# Patient Record
Sex: FEMALE | Race: WHITE | NOT HISPANIC OR LATINO | Employment: STUDENT | URBAN - METROPOLITAN AREA
[De-identification: names, ages, dates, MRNs, and addresses within clinical notes are randomized per-mention and may not be internally consistent; named-entity substitution may affect disease eponyms.]

---

## 2017-01-05 ENCOUNTER — HOSPITAL ENCOUNTER (OUTPATIENT)
Dept: GENERAL RADIOLOGY | Facility: HOSPITAL | Age: 37
Discharge: HOME | End: 2017-01-05
Admitting: NURSE PRACTITIONER

## 2017-01-05 ENCOUNTER — TRANSCRIBE ORDERS (OUTPATIENT)
Dept: ADMINISTRATIVE | Facility: HOSPITAL | Age: 37
End: 2017-01-05

## 2017-01-05 DIAGNOSIS — M79.645 THUMB PAIN, LEFT: Primary | ICD-10-CM

## 2017-01-05 PROCEDURE — 73140 X-RAY EXAM OF FINGER(S): CPT

## 2017-06-30 ENCOUNTER — DOCUMENTATION (OUTPATIENT)
Dept: BARIATRICS/WEIGHT MGMT | Facility: CLINIC | Age: 37
End: 2017-06-30

## 2017-06-30 DIAGNOSIS — R06.00 DYSPNEA, UNSPECIFIED TYPE: ICD-10-CM

## 2017-06-30 DIAGNOSIS — R53.83 FATIGUE, UNSPECIFIED TYPE: ICD-10-CM

## 2017-06-30 DIAGNOSIS — R10.13 DYSPEPSIA: Primary | ICD-10-CM

## 2017-06-30 RX ORDER — PHENTERMINE HYDROCHLORIDE 37.5 MG/1
37.5 CAPSULE ORAL 2 TIMES DAILY
COMMUNITY
End: 2018-03-06

## 2017-07-31 DIAGNOSIS — R10.13 DYSPEPSIA: ICD-10-CM

## 2017-07-31 DIAGNOSIS — R06.00 DYSPNEA, UNSPECIFIED TYPE: ICD-10-CM

## 2017-07-31 DIAGNOSIS — R53.83 FATIGUE, UNSPECIFIED TYPE: ICD-10-CM

## 2017-08-03 ENCOUNTER — DOCUMENTATION (OUTPATIENT)
Dept: BARIATRICS/WEIGHT MGMT | Facility: CLINIC | Age: 37
End: 2017-08-03

## 2017-08-03 ENCOUNTER — OFFICE VISIT (OUTPATIENT)
Dept: BARIATRICS/WEIGHT MGMT | Facility: CLINIC | Age: 37
End: 2017-08-03

## 2017-08-03 VITALS
BODY MASS INDEX: 41.46 KG/M2 | SYSTOLIC BLOOD PRESSURE: 156 MMHG | OXYGEN SATURATION: 98 % | DIASTOLIC BLOOD PRESSURE: 106 MMHG | HEIGHT: 66 IN | HEART RATE: 70 BPM | TEMPERATURE: 97.3 F | WEIGHT: 258 LBS | RESPIRATION RATE: 24 BRPM

## 2017-08-03 DIAGNOSIS — R53.83 FATIGUE, UNSPECIFIED TYPE: Primary | ICD-10-CM

## 2017-08-03 DIAGNOSIS — R06.09 DYSPNEA ON EXERTION: ICD-10-CM

## 2017-08-03 DIAGNOSIS — R10.13 DYSPEPSIA: ICD-10-CM

## 2017-08-03 DIAGNOSIS — E66.01 MORBID OBESITY, UNSPECIFIED OBESITY TYPE (HCC): ICD-10-CM

## 2017-08-03 PROCEDURE — 99205 OFFICE O/P NEW HI 60 MIN: CPT | Performed by: PHYSICIAN ASSISTANT

## 2017-08-03 RX ORDER — DIPHENHYDRAMINE HCL 25 MG
25 CAPSULE ORAL EVERY 6 HOURS PRN
COMMUNITY
End: 2018-04-16

## 2017-08-03 RX ORDER — MELATONIN
1000 DAILY
COMMUNITY
End: 2018-02-27

## 2017-08-03 NOTE — PROGRESS NOTES
"Weight Loss Surgery  Presurgical Nutrition Assessment     Zofia Ortiz  2017  [id number]  [id number]  1980  female    Surgery desired: Sleeve Gastrectomy    Ht 66\" (167.6 cm); Wt 258 # (117 kg); BMI 41.5  Past Medical History:   Diagnosis Date   • Dyspepsia    • Dyspnea on exertion    • Elevated BP     156/106 @ Intake, denies hx HTN   • Fatigue    • Former smoker     Vapes now   • Morbid obesity    • Vitamin D deficiency      Past Surgical History:   Procedure Laterality Date   •  SECTION WITH TUBAL     • KNEE ARTHROSCOPY Right      No Known Allergies    Current Outpatient Prescriptions:   •  cholecalciferol (VITAMIN D3) 1000 UNITS tablet, Take 1,000 Units by mouth Daily., Disp: , Rfl:   •  diphenhydrAMINE (BENADRYL) 25 mg capsule, Take 25 mg by mouth Every 6 (Six) Hours As Needed for Sleep., Disp: , Rfl:   •  Multiple Vitamins-Minerals (DAILY MULTIVITAMIN PO), Take  by mouth., Disp: , Rfl:   •  phentermine 37.5 MG capsule, Take 37.5 mg by mouth 2 (Two) Times a Day., Disp: , Rfl:       Nutrition Assessment    Estimated energy needs:  1875 kcal    Estimated calories for weight loss:  1400 kcal    IBW (Pounds):  150 #        Excess body weight (Pounds):  108 #       Nutrition Recall  24 Hour recall: (B) (L) (D) -  Reviewed and discussed with patient.  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  Has Myoplex shakes she purchased at health food store some days.  Often 2 meals qd only.  Frequent fast food sandwiches.  Encouraged healthier alternatives to fast food sandwiches.       Exercise  intermittently; CrossFit & walking as able (d/t knee pain is limited)      Education    Provided info packet re. Sleeve Gastrectomy; .  Reviewed mechanics of healthy eating for current use & ongoing /p recovery from surgery.  Enc'd pt to choose preferred protein powder or pkg'd protein beverage for " post-op diet and for use now as needed.  Discussed goal setting to address change from unhealthy eating habits to healthier ones.    Recommend that team proceed with surgery and follow per protocol.      Nutrition Goals   Dietary Guidelines per info packet, as above.  Protein goal:  grams per day   -140 grams per day  Eliminate soda    Exercise Goals  Continue current exercise routine   Add 15-30 minutes of activity per day as tolerated      Eufemia Mcclain RD  08/03/2017  10:46 AM

## 2017-08-03 NOTE — PROGRESS NOTES
[facility] BARIATRIC SURGERY  [address]  [facility] [address]  [phone]      Patient  Name:  Zofia Ortiz.  :  1980      Date of Visit: 8/3/2017      Chief Complaint:  weight gain; unable to maintain weight loss    History of Present Illness:  Zofia Ortiz is a 37 y.o. female who presents today for evaluation, education and consultation regarding weight loss surgery. The patient is interested in sleeve gastrectomy     Zofia has been overweight for at least 11 years, has been 35 pounds or more overweight for at least 10 years, has been 100 pounds or more overweight for 10 or more years and started dieting at age 14.     Previous diet attempts include: Body for Life/Gerardo Giordano, High Protein, Low Carbohydrate, Low Fat, [facility], Calorie Counting, Kasie's Diet, Cabbage Soup, Fasting and Slim Fast; Horizon weight loss, figure weight loss and pound matters; Amphetamines.  The most weight Zofia lost was 75 pounds on exercise and diet but was only able to maintain that weight loss for 3-4 years.  Her maximum lifetime weight is 265 pounds.      Past Medical History:   Diagnosis Date   • Dyspepsia    • Dyspnea on exertion    • Elevated BP     156/106 @ Intake, denies hx HTN   • Fatigue    • Former smoker     Vapes now   • Morbid obesity    • Vitamin D deficiency      Past Surgical History:   Procedure Laterality Date   •  SECTION WITH TUBAL     • KNEE ARTHROSCOPY Right        No Known Allergies      Current Outpatient Prescriptions:   •  cholecalciferol (VITAMIN D3) 1000 UNITS tablet, Take 1,000 Units by mouth Daily., Disp: , Rfl:   •  diphenhydrAMINE (BENADRYL) 25 mg capsule, Take 25 mg by mouth Every 6 (Six) Hours As Needed for Sleep., Disp: , Rfl:   •  Multiple Vitamins-Minerals (DAILY MULTIVITAMIN PO), Take  by mouth., Disp: , Rfl:   •  phentermine 37.5 MG capsule, Take 37.5 mg by mouth 2 (Two) Times a Day., Disp: , Rfl:     Social History     Social  History   • Marital status:      Spouse name: Bruce Ortiz   • Number of children: 2   • Years of education: Bachelor's     Occupational History   • Homemaker      Social History Main Topics   • Smoking status: Former Smoker     Packs/day: 0.50     Years: 8.00     Quit date: 2013   • Smokeless tobacco: Never Used      Comment: uses vape w/ small amount of nicotine daily-smokeless   • Alcohol use Yes      Comment: 2-3 drinks weekly 2-3 drinks each time    • Drug use: No   • Sexual activity: Yes     Partners: Male      Comment:       Other Topics Concern   • Not on file     Social History Narrative    Lives in [place], [place] w/  and 2 children.  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     Family History   Problem Relation Age of Onset   • Hypertension Mother    • No Known Problems Father    • No Known Problems Sister    • Obesity Maternal Grandmother    • Hypertension Maternal Grandmother    • Diabetes Maternal Grandmother    • Melanoma Maternal Grandfather        Review of Systems:  Constitutional:  The patient reports fatigue, weight gain and denies fevers and chills.  Cardiovascular:  The patient denies HTN, HLD, heart disease and DVT.  Respiratory:  The patient denies asthma, apnea and PE.  Gastrointestinal:  The patient denies heartburn, gallbladder issues, or liver disease.  Genitourinary:  The patient denies renal insufficiency.    Musculoskeletal:  The patient denies joint pain, fibromyalgia and arthritis.  Neurological:  The patient denies seizure and stroke.  Psychiatric:  The patient denies anxiety, depression and bipolar disorder.  Endocrine:  The patient denies diabetes and thyroid disease.  Hematologic:  The patient denies anemia and bleeding disorder.  Skin:  The patient denies MRSA.    Physical Exam:  Vital Signs:  Weight: 258 lb (117 kg)   Body mass index is 41.64 kg/(m^2).  Temp: 97.3 °F (36.3 °C)   Heart Rate: 70   BP: (!) 156/106 (Recheck: 161/103)     Physical Exam   Constitutional: She is  oriented to person, place, and time. She appears well-developed and well-nourished.   HENT:   Head: Normocephalic and atraumatic.   Eyes: Conjunctivae are normal. No scleral icterus.   Neck: Neck supple. No thyromegaly present.   Cardiovascular: Normal rate and regular rhythm.    No murmur heard.  Pulmonary/Chest: Effort normal and breath sounds normal. No respiratory distress. She has no wheezes. She has no rales.   Abdominal: Soft. Bowel sounds are normal. She exhibits no distension and no mass. There is no tenderness. No hernia.   Scars:  pfannenstiel   Musculoskeletal: Normal range of motion. She exhibits no edema.   Neurological: She is alert and oriented to person, place, and time. Gait normal.   Skin: Skin is warm and dry. No rash noted.   Psychiatric: She has a normal mood and affect. Judgment normal.   Vitals reviewed.         Patient Active Problem List   Diagnosis   • Vitamin D deficiency   • Morbid obesity   • Fatigue   • Dyspepsia   • Dyspnea on exertion   • Former smoker   • Elevated BP       Assessment:    Zofia Ortiz is a 37 y.o. year old female with medically complicated obesity pursuing sleeve gastrectomy.    Weight loss surgery is deemed medically necessary given the following obesity related comorbidities including possible HTN with current Weight: 258 lb (117 kg) and Body mass index is 41.64 kg/(m^2)..    Plan:  The consultation plan and program requirements were reviewed with the patient.  The patient has been advised that a letter of medical support must be obtained from her primary care physician or referring provider. A psychological evaluation will be arranged.  A nutritional evaluation will be performed.  The patient was advised to start a high protein and low carbohydrate diet.  Necessary lifestyle modifications were discussed.  Instructions on how to access SAGAR was given to the patient.  SAGAR is an internet based educational video that explains the surgical procedure chosen and answers  basic questions regarding that procedure.     Preoperative testing will include: CBC, CMP, Fasting Lipids, TSH, H.Pylori, Pulmonary Function Testing, CXR and EKG     Additional preop clearances required prior to surgery: None.      Patient understands that bariatric surgery is not cosmetic surgery but rather a tool to help make a lifelong commitment to lifestyle changes including diet, exercise, behavior modifications, and healthy habits.      GUERITA Anne

## 2017-08-07 LAB
ALBUMIN SERPL-MCNC: 4.2 G/DL (ref 3.2–4.8)
ALBUMIN/GLOB SERPL: 1.6 G/DL (ref 1.5–2.5)
ALP SERPL-CCNC: 75 U/L (ref 25–100)
ALT SERPL-CCNC: 42 U/L (ref 7–40)
AST SERPL-CCNC: 35 U/L (ref 0–33)
BILIRUB SERPL-MCNC: 0.3 MG/DL (ref 0.3–1.2)
BUN SERPL-MCNC: 10 MG/DL (ref 9–23)
BUN/CREAT SERPL: 14.3 (ref 7–25)
CALCIUM SERPL-MCNC: 9.2 MG/DL (ref 8.7–10.4)
CHLORIDE SERPL-SCNC: 104 MMOL/L (ref 99–109)
CHOLEST SERPL-MCNC: 173 MG/DL (ref 0–200)
CO2 SERPL-SCNC: 25 MMOL/L (ref 20–31)
CREAT SERPL-MCNC: 0.7 MG/DL (ref 0.6–1.3)
ERYTHROCYTE [DISTWIDTH] IN BLOOD BY AUTOMATED COUNT: 12.5 % (ref 11.3–14.5)
GLOBULIN SER CALC-MCNC: 2.6 GM/DL
GLUCOSE SERPL-MCNC: 107 MG/DL (ref 70–100)
H PYLORI IGA SER-ACNC: <9 UNITS (ref 0–8.9)
H PYLORI IGG SER IA-ACNC: <0.9 U/ML (ref 0–0.8)
H PYLORI IGM SER-ACNC: <9 UNITS (ref 0–8.9)
HCT VFR BLD AUTO: 43.8 % (ref 34.5–44)
HDLC SERPL-MCNC: 85 MG/DL (ref 40–60)
HGB BLD-MCNC: 14.1 G/DL (ref 11.5–15.5)
LDLC SERPL CALC-MCNC: 70 MG/DL (ref 0–100)
MCH RBC QN AUTO: 30.6 PG (ref 27–31)
MCHC RBC AUTO-ENTMCNC: 32.2 G/DL (ref 32–36)
MCV RBC AUTO: 95 FL (ref 80–99)
PLATELET # BLD AUTO: 324 10*3/MM3 (ref 150–450)
POTASSIUM SERPL-SCNC: 4.2 MMOL/L (ref 3.5–5.5)
PROT SERPL-MCNC: 6.8 G/DL (ref 5.7–8.2)
RBC # BLD AUTO: 4.61 10*6/MM3 (ref 3.89–5.14)
SODIUM SERPL-SCNC: 137 MMOL/L (ref 132–146)
TRIGL SERPL-MCNC: 89 MG/DL (ref 0–150)
TSH SERPL DL<=0.005 MIU/L-ACNC: 4.21 MIU/ML (ref 0.35–5.35)
VLDLC SERPL CALC-MCNC: 17.8 MG/DL
WBC # BLD AUTO: 8.71 10*3/MM3 (ref 3.5–10.8)

## 2018-02-13 DIAGNOSIS — R53.83 FATIGUE, UNSPECIFIED TYPE: ICD-10-CM

## 2018-02-13 DIAGNOSIS — R06.00 DYSPNEA, UNSPECIFIED TYPE: Primary | ICD-10-CM

## 2018-02-22 ENCOUNTER — LAB (OUTPATIENT)
Dept: LAB | Facility: HOSPITAL | Age: 38
End: 2018-02-22

## 2018-02-22 ENCOUNTER — HOSPITAL ENCOUNTER (OUTPATIENT)
Dept: GENERAL RADIOLOGY | Facility: HOSPITAL | Age: 38
Discharge: HOME | End: 2018-02-22
Admitting: PHYSICIAN ASSISTANT

## 2018-02-22 DIAGNOSIS — R53.83 FATIGUE, UNSPECIFIED TYPE: ICD-10-CM

## 2018-02-22 DIAGNOSIS — R06.00 DYSPNEA, UNSPECIFIED TYPE: ICD-10-CM

## 2018-02-22 LAB
ALBUMIN SERPL-MCNC: 4.4 G/DL (ref 3.2–4.8)
ALBUMIN/GLOB SERPL: 1.6 G/DL (ref 1.5–2.5)
ALP SERPL-CCNC: 83 U/L (ref 25–100)
ALT SERPL W P-5'-P-CCNC: 82 U/L (ref 7–40)
ANION GAP SERPL CALCULATED.3IONS-SCNC: 9 MMOL/L (ref 3–11)
AST SERPL-CCNC: 57 U/L (ref 0–33)
BILIRUB SERPL-MCNC: 0.7 MG/DL (ref 0.3–1.2)
BUN BLD-MCNC: 7 MG/DL (ref 9–23)
BUN/CREAT SERPL: 8.8 (ref 7–25)
CALCIUM SPEC-SCNC: 9.4 MG/DL (ref 8.7–10.4)
CHLORIDE SERPL-SCNC: 104 MMOL/L (ref 99–109)
CO2 SERPL-SCNC: 27 MMOL/L (ref 20–31)
CREAT BLD-MCNC: 0.8 MG/DL (ref 0.6–1.3)
DEPRECATED RDW RBC AUTO: 44.3 FL (ref 37–54)
ERYTHROCYTE [DISTWIDTH] IN BLOOD BY AUTOMATED COUNT: 13 % (ref 11.3–14.5)
GFR SERPL CREATININE-BSD FRML MDRD: 80 ML/MIN/1.73
GLOBULIN UR ELPH-MCNC: 2.7 GM/DL
GLUCOSE BLD-MCNC: 116 MG/DL (ref 70–100)
HCT VFR BLD AUTO: 47.6 % (ref 34.5–44)
HGB BLD-MCNC: 15.8 G/DL (ref 11.5–15.5)
MCH RBC QN AUTO: 31.3 PG (ref 27–31)
MCHC RBC AUTO-ENTMCNC: 33.2 G/DL (ref 32–36)
MCV RBC AUTO: 94.4 FL (ref 80–99)
PLATELET # BLD AUTO: 320 10*3/MM3 (ref 150–450)
PMV BLD AUTO: 10.3 FL (ref 6–12)
POTASSIUM BLD-SCNC: 3.5 MMOL/L (ref 3.5–5.5)
PROT SERPL-MCNC: 7.1 G/DL (ref 5.7–8.2)
RBC # BLD AUTO: 5.04 10*6/MM3 (ref 3.89–5.14)
SODIUM BLD-SCNC: 140 MMOL/L (ref 132–146)
WBC NRBC COR # BLD: 8.59 10*3/MM3 (ref 3.5–10.8)

## 2018-02-22 PROCEDURE — 93005 ELECTROCARDIOGRAM TRACING: CPT | Performed by: PHYSICIAN ASSISTANT

## 2018-02-22 PROCEDURE — 85027 COMPLETE CBC AUTOMATED: CPT

## 2018-02-22 PROCEDURE — 80053 COMPREHEN METABOLIC PANEL: CPT

## 2018-02-22 PROCEDURE — 36415 COLL VENOUS BLD VENIPUNCTURE: CPT

## 2018-02-22 PROCEDURE — 71046 X-RAY EXAM CHEST 2 VIEWS: CPT

## 2018-02-22 PROCEDURE — 93010 ELECTROCARDIOGRAM REPORT: CPT | Performed by: INTERNAL MEDICINE

## 2018-02-27 ENCOUNTER — CONSULT (OUTPATIENT)
Dept: BARIATRICS/WEIGHT MGMT | Facility: CLINIC | Age: 38
End: 2018-02-27

## 2018-02-27 VITALS
WEIGHT: 239 LBS | DIASTOLIC BLOOD PRESSURE: 107 MMHG | HEIGHT: 66 IN | TEMPERATURE: 98.1 F | RESPIRATION RATE: 18 BRPM | SYSTOLIC BLOOD PRESSURE: 146 MMHG | BODY MASS INDEX: 38.41 KG/M2 | OXYGEN SATURATION: 99 % | HEART RATE: 88 BPM

## 2018-02-27 DIAGNOSIS — Z53.21 PATIENT LEFT WITHOUT BEING SEEN: Primary | ICD-10-CM

## 2018-03-06 ENCOUNTER — CONSULT (OUTPATIENT)
Dept: BARIATRICS/WEIGHT MGMT | Facility: CLINIC | Age: 38
End: 2018-03-06

## 2018-03-06 VITALS
WEIGHT: 253.5 LBS | TEMPERATURE: 97.9 F | OXYGEN SATURATION: 99 % | DIASTOLIC BLOOD PRESSURE: 88 MMHG | SYSTOLIC BLOOD PRESSURE: 159 MMHG | HEIGHT: 66 IN | RESPIRATION RATE: 18 BRPM | BODY MASS INDEX: 40.74 KG/M2 | HEART RATE: 88 BPM

## 2018-03-06 DIAGNOSIS — E66.01 OBESITY, CLASS III, BMI 40-49.9 (MORBID OBESITY) (HCC): Primary | ICD-10-CM

## 2018-03-06 PROCEDURE — 99407 BEHAV CHNG SMOKING > 10 MIN: CPT | Performed by: SURGERY

## 2018-03-06 PROCEDURE — 99214 OFFICE O/P EST MOD 30 MIN: CPT | Performed by: SURGERY

## 2018-03-06 RX ORDER — SCOLOPAMINE TRANSDERMAL SYSTEM 1 MG/1
1 PATCH, EXTENDED RELEASE TRANSDERMAL ONCE
Status: CANCELLED | OUTPATIENT
Start: 2018-03-06 | End: 2018-03-06

## 2018-03-06 RX ORDER — CHLORHEXIDINE GLUCONATE 0.12 MG/ML
15 RINSE ORAL ONCE
Status: CANCELLED | OUTPATIENT
Start: 2018-03-06

## 2018-03-06 RX ORDER — PANTOPRAZOLE SODIUM 40 MG/10ML
40 INJECTION, POWDER, LYOPHILIZED, FOR SOLUTION INTRAVENOUS ONCE
Status: CANCELLED | OUTPATIENT
Start: 2018-03-06 | End: 2018-03-06

## 2018-03-06 RX ORDER — SODIUM CHLORIDE 0.9 % (FLUSH) 0.9 %
1-10 SYRINGE (ML) INJECTION AS NEEDED
Status: CANCELLED | OUTPATIENT
Start: 2018-03-06

## 2018-03-06 RX ORDER — SODIUM CHLORIDE, SODIUM LACTATE, POTASSIUM CHLORIDE, CALCIUM CHLORIDE 600; 310; 30; 20 MG/100ML; MG/100ML; MG/100ML; MG/100ML
100 INJECTION, SOLUTION INTRAVENOUS CONTINUOUS
Status: CANCELLED | OUTPATIENT
Start: 2018-03-06

## 2018-03-06 NOTE — PROGRESS NOTES
[facility] BARIATRIC SURGERY  [address]  [facility] [address]  [phone]      Patient  Name:  Zofia Ortiz.  :  1980      Date of Visit: 3/6/2018      Chief Complaint:  weight gain; unable to maintain weight loss.  Preop LSG    History of Present Illness:  Zofia Ortiz is a 38 y.o. female who presents today for evaluation, education and consultation regarding weight loss surgery. The patient is interested in sleeve gastrectomy     Zofia has been overweight for at least 11 years, has been 35 pounds or more overweight for at least 10 years, has been 100 pounds or more overweight for 10 or more years and started dieting at age 14.     Previous diet attempts include: Body for Life/Gerardo Giordano, High Protein, Low Carbohydrate, Low Fat, [facility], Calorie Counting, Kasie's Diet, Cabbage Soup, Fasting and Slim Fast; Horizon weight loss, figure weight loss and pound matters; Amphetamines.  The most weight Zofia lost was 75 pounds on exercise and diet but was only able to maintain that weight loss for 3-4 years.  Her maximum lifetime weight is 265 pounds.    The patient returns for final visit prior to LSG.  Original intake evaluation LC  reviewed.  The patient has had issues with morbid obesity for years and only temporary success with non-surgical methods of weight loss.  The patient is seeking LSG to help with the morbid obesity related conditions of dyspepsia, CHAVEZ, fatigue, vit D defic, elevated transaminases, abnormal glucose.  I told her incr risk of delayed LSG leak with mae mazariegos. Says she will quit until surgery and 6 wks after to hopefully minimize the risk of leak.      Past Medical History:   Diagnosis Date   • Abnormal glucose    • Dyspepsia     rare heartburn sx's, serum h. pyl neg   • Dyspnea on exertion    • Elevated BP     156/106 @ Intake, denies hx HTN.  159/88 today.  Reviewed her BP in outlying preop diet visits, all normal range   •  Elevated transaminase level    • Fatigue    • Former smoker     Vapes now   • Morbid obesity    •  (normal spontaneous vaginal delivery)     x 1 w/o complic   • Vitamin D deficiency      Past Surgical History:   Procedure Laterality Date   •  SECTION WITH TUBAL     • KNEE ARTHROSCOPY Right    Denies wound infxn/prev blood transfusions. No GB sx's.    No Known Allergies      Current Outpatient Prescriptions:   •  diphenhydrAMINE (BENADRYL) 25 mg capsule, Take 25 mg by mouth Every 6 (Six) Hours As Needed for Sleep., Disp: , Rfl:   •  Multiple Vitamins-Minerals (DAILY MULTIVITAMIN PO), Take  by mouth., Disp: , Rfl:     Social History     Social History   • Marital status:      Spouse name: Bruce Ortiz   • Number of children: 2   • Years of education: Bachelor's     Occupational History   • Homemaker      Social History Main Topics   • Smoking status: Former Smoker     Packs/day: 0.50     Years: 8.00     Quit date:    • Smokeless tobacco: Never Used      Comment: uses vape w/ small amount of nicotine daily-smokeless   • Alcohol use Yes      Comment: 2-3 drinks weekly 2-3 drinks each time    • Drug use: No   • Sexual activity: Yes     Partners: Male      Comment:       Other Topics Concern   • Not on file     Social History Narrative    Lives in [place], [place] w/  and 2 children.  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     Family History   Problem Relation Age of Onset   • Hypertension Mother    • No Known Problems Father    • No Known Problems Sister    • Obesity Maternal Grandmother    • Hypertension Maternal Grandmother    • Diabetes Maternal Grandmother    • Melanoma Maternal Grandfather        Review of Systems:  Constitutional:  The patient reports fatigue, weight gain and denies fevers and chills.  Cardiovascular:  The patient denies HTN, HLD, heart disease and DVT.  Respiratory:  The patient denies asthma, apnea and PE.  Gastrointestinal:  The patient denies heartburn, gallbladder  issues, or liver disease.  Genitourinary:  The patient denies renal insufficiency.    Musculoskeletal:  The patient denies joint pain, fibromyalgia and arthritis.  Neurological:  The patient denies seizure and stroke.  Psychiatric:  The patient denies anxiety, depression and bipolar disorder.  Endocrine:  The patient denies diabetes and thyroid disease.  Hematologic:  The patient denies anemia and bleeding disorder.  Skin:  The patient denies MRSA.    Physical Exam:  Vital Signs:  Weight: 115 kg (253 lb 8 oz)   Body mass index is 40.92 kg/(m^2).  Temp: 97.9 °F (36.6 °C)   Heart Rate: 88   BP: 159/88     Physical Exam   Constitutional: She is oriented to person, place, and time. She appears well-developed and well-nourished.   HENT:   Head: Normocephalic and atraumatic.   Eyes: Conjunctivae are normal. No scleral icterus.   Neck: Neck supple. Carotid bruit is not present. No thyromegaly present.   Cardiovascular: Normal rate and regular rhythm.    No murmur heard.  Pulmonary/Chest: Effort normal and breath sounds normal. No respiratory distress. She has no wheezes. She has no rales.   Abdominal: Soft. Bowel sounds are normal. She exhibits no distension and no mass. There is no tenderness. No hernia.       Scars:  pfannenstiel   Musculoskeletal: Normal range of motion. She exhibits no edema.   Neurological: She is alert and oriented to person, place, and time. Gait normal.   Skin: Skin is warm and dry. No rash noted.   Psychiatric: She has a normal mood and affect. Judgment normal.   Vitals reviewed.         Patient Active Problem List   Diagnosis   • Vitamin D deficiency   • Morbid obesity   • Fatigue   • Dyspepsia   • Dyspnea on exertion   • Former smoker   • Elevated BP   Psych Brown 8/17 approp  Shyam - Phenterimine x 9    Assessment:    Zofia Ortiz is a 38 y.o. year old female with medically complicated obesity pursuing sleeve gastrectomy.    Weight loss surgery is deemed medically necessary given the following  "obesity related comorbidities including possible HTN with current Weight: 115 kg (253 lb 8 oz) and Body mass index is 40.92 kg/(m^2)..    Patient is aware that surgery is a tool, and that weight loss is not guaranteed but only seen in the context of appropriate use, follow up and exercise.    The patient was present for an approximately a 2.5 hour discussion of the purpose of weight loss surgery, how WLS is a tool to assist in achieving weight loss goals, the most common complications and how best to avoid them, and the strategies for short and long term weight loss.  Ample opportunity to discuss questions was available both in group and during the time of individual examination.    I reviewed all available preop labs, Xrays, tests, clearances, etc and signed off on these in the record.  All of this in addition to the patient's unique history and exam has been taken into consideration in determining their appropriate candidacy for weight loss surgery.    Complications  of laparoscopic/possible robotic gastric sleeve were discussed. The patient is well aware of the potential complications of surgery that include but not limited to bleeding, infections, deep venous thrombosis, pulmonary embolism, pulmonary complications such as pneumonia, cardiac events, hernias, small bowel obstruction, damage to the spleen or other organs, bowel injury, disfiguring scars, failure to lose weight, need for additional surgery, conversion to an open procedure, and death. Patient is also aware of complications which apply in this particular procedure that can include but are not limited to a \"leak\" at the staple line which in some instances may require conversion to gastric bypass.    The patient is aware if a hiatal hernia is encountered, it likely will be repaired.  R/B/A Rx to hiatal hernia repair were discussed as outlined in our long consent form.  Briefly risks in addition to those for LSG include recurrent hernia, MILLER, dysphagia, " esophageal injury, pneumothorax, injury to the vagus nerves, injury to the thoracic duct, aorta or vena cava.    Greater than 3 minutes was spent with the patient discussing avoiding all tobacco products and second hand smoke at least 2 weeks pre-operatively and 6 weeks post-operatively to minimize the risk of sleeve leak.  This included discussing the importance of avoiding even secondhand smoke as the risk of leak is increased.  Examples discussed:  I made it very clear that the patient understands they should avoid even riding in a car where someone has previously smoked in the last 2 weeks, living in a house where someone smokes (even if it's in a separate room/patio/attached garage, etc.) we discussed that they should not have a conversation with a group of people who are smoking even if it's outside.  They can be around wood burning fires and barbecue.  I told them I do not know if marijuana has a same effects but my overall recommendation is to avoid it for 2 weeks prior in 6 weeks after surgery.  They also are aware that nicotine may also increase the risk of leak and I strongly encouraged him to avoid that as well for 2 weeks prior in 6 weeks after surgery.    Discussed the risks, benefits and alternative therapies at great length as outlined in our extensive consent forms, consent videos, and educational teaching process under the direction of the center's .    A copy of the patient's signed informed consent is on file.    Plan:  Wants to do at [facility] next week.  Liver bx if deemed appropl    Patient understands that bariatric surgery is not cosmetic surgery but rather a tool to help make a lifelong commitment to lifestyle changes including diet, exercise, behavior modifications, and healthy habits.      Glen Blackmon MD

## 2018-03-08 PROBLEM — E66.01 OBESITY, CLASS III, BMI 40-49.9 (MORBID OBESITY): Status: ACTIVE | Noted: 2018-03-08

## 2018-03-08 PROBLEM — E66.813 OBESITY, CLASS III, BMI 40-49.9 (MORBID OBESITY): Status: ACTIVE | Noted: 2018-03-08

## 2018-03-12 ENCOUNTER — APPOINTMENT (OUTPATIENT)
Dept: PREADMISSION TESTING | Facility: HOSPITAL | Age: 38
End: 2018-03-12

## 2018-03-12 VITALS — WEIGHT: 250 LBS | HEIGHT: 66 IN | BODY MASS INDEX: 40.18 KG/M2

## 2018-03-12 LAB — B-HCG UR QL: NEGATIVE

## 2018-03-12 PROCEDURE — 81025 URINE PREGNANCY TEST: CPT | Performed by: SURGERY

## 2018-03-12 RX ORDER — CALCIUM CARBONATE 200(500)MG
1 TABLET,CHEWABLE ORAL DAILY
COMMUNITY
End: 2018-09-12

## 2018-03-14 ENCOUNTER — ANESTHESIA (OUTPATIENT)
Dept: PERIOP | Facility: HOSPITAL | Age: 38
End: 2018-03-14

## 2018-03-14 ENCOUNTER — HOSPITAL ENCOUNTER (INPATIENT)
Facility: HOSPITAL | Age: 38
LOS: 1 days | Discharge: HOME | End: 2018-03-15
Attending: SURGERY | Admitting: SURGERY

## 2018-03-14 ENCOUNTER — ANESTHESIA EVENT (OUTPATIENT)
Dept: PERIOP | Facility: HOSPITAL | Age: 38
End: 2018-03-14

## 2018-03-14 DIAGNOSIS — E66.01 OBESITY, CLASS III, BMI 40-49.9 (MORBID OBESITY) (HCC): ICD-10-CM

## 2018-03-14 PROCEDURE — 25010000002 ONDANSETRON PER 1 MG: Performed by: SURGERY

## 2018-03-14 PROCEDURE — 25010000002 ONDANSETRON PER 1 MG: Performed by: NURSE ANESTHETIST, CERTIFIED REGISTERED

## 2018-03-14 PROCEDURE — 25010000002 ENOXAPARIN PER 10 MG: Performed by: SURGERY

## 2018-03-14 PROCEDURE — 25010000002 CYANOCOBALAMIN PER 1000 MCG: Performed by: SURGERY

## 2018-03-14 PROCEDURE — 25010000002 FENTANYL CITRATE (PF) 250 MCG/5ML SOLUTION: Performed by: NURSE ANESTHETIST, CERTIFIED REGISTERED

## 2018-03-14 PROCEDURE — 25010000002 MIDAZOLAM PER 1 MG: Performed by: NURSE ANESTHETIST, CERTIFIED REGISTERED

## 2018-03-14 PROCEDURE — 0DJ08ZZ INSPECTION OF UPPER INTESTINAL TRACT, VIA NATURAL OR ARTIFICIAL OPENING ENDOSCOPIC: ICD-10-PCS | Performed by: SURGERY

## 2018-03-14 PROCEDURE — 25010000002 PROPOFOL 1000 MG/ML EMULSION: Performed by: NURSE ANESTHETIST, CERTIFIED REGISTERED

## 2018-03-14 PROCEDURE — 94640 AIRWAY INHALATION TREATMENT: CPT

## 2018-03-14 PROCEDURE — 25010000002 DEXAMETHASONE SODIUM PHOSPHATE 10 MG/ML SOLUTION: Performed by: NURSE ANESTHETIST, CERTIFIED REGISTERED

## 2018-03-14 PROCEDURE — 25010000002 METOCLOPRAMIDE PER 10 MG: Performed by: SURGERY

## 2018-03-14 PROCEDURE — 0DB64Z3 EXCISION OF STOMACH, PERCUTANEOUS ENDOSCOPIC APPROACH, VERTICAL: ICD-10-PCS | Performed by: SURGERY

## 2018-03-14 PROCEDURE — 25010000002 MORPHINE PER 10 MG: Performed by: SURGERY

## 2018-03-14 PROCEDURE — 25010000002 FENTANYL CITRATE (PF) 100 MCG/2ML SOLUTION: Performed by: NURSE ANESTHETIST, CERTIFIED REGISTERED

## 2018-03-14 PROCEDURE — 25010000002 HYDROMORPHONE PER 4 MG: Performed by: NURSE ANESTHETIST, CERTIFIED REGISTERED

## 2018-03-14 PROCEDURE — 25010000002 PROMETHAZINE PER 50 MG: Performed by: NURSE ANESTHETIST, CERTIFIED REGISTERED

## 2018-03-14 PROCEDURE — 25010000002 DIPHENHYDRAMINE PER 50 MG: Performed by: NURSE ANESTHETIST, CERTIFIED REGISTERED

## 2018-03-14 PROCEDURE — 43775 LAP SLEEVE GASTRECTOMY: CPT | Performed by: SURGERY

## 2018-03-14 PROCEDURE — 25010000002 HYDROMORPHONE PER 4 MG

## 2018-03-14 PROCEDURE — 25010000002 DEXAMETHASONE PER 1 MG: Performed by: NURSE ANESTHETIST, CERTIFIED REGISTERED

## 2018-03-14 PROCEDURE — 25010000002 PROPOFOL 200 MG/20ML EMULSION: Performed by: NURSE ANESTHETIST, CERTIFIED REGISTERED

## 2018-03-14 PROCEDURE — 94799 UNLISTED PULMONARY SVC/PX: CPT

## 2018-03-14 PROCEDURE — 25010000003 CEFAZOLIN PER 500 MG: Performed by: SURGERY

## 2018-03-14 DEVICE — SEALANT FIBRIN TISSEEL FZ 4ML: Type: IMPLANTABLE DEVICE | Site: ABDOMEN | Status: FUNCTIONAL

## 2018-03-14 DEVICE — PERI-STRIPS DRY WITH VERITAS COLLAGEN MATRIX (PSD-V) IS PREPARED FROM DEHYDRATED BOVINE PERICARDIUM PROCURED FROM CATTLE UNDER 30 MONTHS OF AGE IN THE UNITED STATES. ONE (1) TUBE OF PSD GEL (GEL) IS PROVIDED FOR EVERY TWO (2) POUCHES OF PSD-V. THE GEL IS USED TO CREATE A TEMPORARY BOND BETWEEN THE PSD-V BUTTRESS AND THE SURGICAL STAPLER JAWS UNTIL THE STAPLER IS POSITIONED AND FIRED.
Type: IMPLANTABLE DEVICE | Site: ABDOMEN | Status: FUNCTIONAL
Brand: PERI-STRIPS DRY WITH VERITAS COLLAGEN MATRIX

## 2018-03-14 RX ORDER — PANTOPRAZOLE SODIUM 40 MG/10ML
40 INJECTION, POWDER, LYOPHILIZED, FOR SOLUTION INTRAVENOUS
Status: DISCONTINUED | OUTPATIENT
Start: 2018-03-15 | End: 2018-03-15 | Stop reason: HOSPADM

## 2018-03-14 RX ORDER — ONDANSETRON 4 MG/1
4 TABLET, ORALLY DISINTEGRATING ORAL EVERY 6 HOURS PRN
Status: DISCONTINUED | OUTPATIENT
Start: 2018-03-14 | End: 2018-03-15 | Stop reason: HOSPADM

## 2018-03-14 RX ORDER — DIPHENHYDRAMINE HYDROCHLORIDE 50 MG/ML
INJECTION INTRAMUSCULAR; INTRAVENOUS AS NEEDED
Status: DISCONTINUED | OUTPATIENT
Start: 2018-03-14 | End: 2018-03-14 | Stop reason: SURG

## 2018-03-14 RX ORDER — METOCLOPRAMIDE HYDROCHLORIDE 5 MG/ML
10 INJECTION INTRAMUSCULAR; INTRAVENOUS EVERY 6 HOURS
Status: DISCONTINUED | OUTPATIENT
Start: 2018-03-14 | End: 2018-03-15 | Stop reason: HOSPADM

## 2018-03-14 RX ORDER — SODIUM CHLORIDE 9 MG/ML
INJECTION, SOLUTION INTRAVENOUS AS NEEDED
Status: DISCONTINUED | OUTPATIENT
Start: 2018-03-14 | End: 2018-03-15 | Stop reason: HOSPADM

## 2018-03-14 RX ORDER — MEPERIDINE HYDROCHLORIDE 50 MG/ML
25 INJECTION INTRAMUSCULAR; INTRAVENOUS; SUBCUTANEOUS
Status: DISCONTINUED | OUTPATIENT
Start: 2018-03-14 | End: 2018-03-14 | Stop reason: HOSPADM

## 2018-03-14 RX ORDER — PROPOFOL 10 MG/ML
INJECTION, EMULSION INTRAVENOUS AS NEEDED
Status: DISCONTINUED | OUTPATIENT
Start: 2018-03-14 | End: 2018-03-14 | Stop reason: SURG

## 2018-03-14 RX ORDER — DIPHENHYDRAMINE HYDROCHLORIDE 50 MG/ML
25 INJECTION INTRAMUSCULAR; INTRAVENOUS EVERY 4 HOURS PRN
Status: DISCONTINUED | OUTPATIENT
Start: 2018-03-14 | End: 2018-03-15 | Stop reason: HOSPADM

## 2018-03-14 RX ORDER — PROMETHAZINE HYDROCHLORIDE 25 MG/ML
12.5 INJECTION, SOLUTION INTRAMUSCULAR; INTRAVENOUS EVERY 6 HOURS PRN
Status: DISCONTINUED | OUTPATIENT
Start: 2018-03-14 | End: 2018-03-15 | Stop reason: HOSPADM

## 2018-03-14 RX ORDER — HYDRALAZINE HYDROCHLORIDE 10 MG/1
10 TABLET, FILM COATED ORAL EVERY 6 HOURS PRN
Status: DISCONTINUED | OUTPATIENT
Start: 2018-03-14 | End: 2018-03-15 | Stop reason: HOSPADM

## 2018-03-14 RX ORDER — NALOXONE HCL 0.4 MG/ML
0.4 VIAL (ML) INJECTION
Status: DISCONTINUED | OUTPATIENT
Start: 2018-03-14 | End: 2018-03-14

## 2018-03-14 RX ORDER — PANTOPRAZOLE SODIUM 40 MG/10ML
40 INJECTION, POWDER, LYOPHILIZED, FOR SOLUTION INTRAVENOUS ONCE
Status: COMPLETED | OUTPATIENT
Start: 2018-03-14 | End: 2018-03-14

## 2018-03-14 RX ORDER — ROCURONIUM BROMIDE 10 MG/ML
INJECTION, SOLUTION INTRAVENOUS AS NEEDED
Status: DISCONTINUED | OUTPATIENT
Start: 2018-03-14 | End: 2018-03-14 | Stop reason: SURG

## 2018-03-14 RX ORDER — MORPHINE SULFATE 1 MG/ML
6 INJECTION, SOLUTION EPIDURAL; INTRATHECAL; INTRAVENOUS
Status: DISCONTINUED | OUTPATIENT
Start: 2018-03-14 | End: 2018-03-14

## 2018-03-14 RX ORDER — CLONIDINE HYDROCHLORIDE 0.1 MG/1
0.1 TABLET ORAL EVERY 6 HOURS PRN
Status: DISCONTINUED | OUTPATIENT
Start: 2018-03-14 | End: 2018-03-14

## 2018-03-14 RX ORDER — SIMETHICONE 80 MG
80 TABLET,CHEWABLE ORAL 4 TIMES DAILY PRN
Status: DISCONTINUED | OUTPATIENT
Start: 2018-03-14 | End: 2018-03-15 | Stop reason: HOSPADM

## 2018-03-14 RX ORDER — LORAZEPAM 0.5 MG/1
1 TABLET ORAL EVERY 12 HOURS PRN
Status: DISCONTINUED | OUTPATIENT
Start: 2018-03-14 | End: 2018-03-15

## 2018-03-14 RX ORDER — LORAZEPAM 2 MG/ML
0.5 INJECTION INTRAMUSCULAR EVERY 12 HOURS PRN
Status: DISCONTINUED | OUTPATIENT
Start: 2018-03-14 | End: 2018-03-15 | Stop reason: HOSPADM

## 2018-03-14 RX ORDER — LORAZEPAM 2 MG/ML
1 INJECTION INTRAMUSCULAR ONCE AS NEEDED
Status: DISCONTINUED | OUTPATIENT
Start: 2018-03-14 | End: 2018-03-14 | Stop reason: HOSPADM

## 2018-03-14 RX ORDER — BUPIVACAINE HYDROCHLORIDE 2.5 MG/ML
INJECTION, SOLUTION EPIDURAL; INFILTRATION; INTRACAUDAL AS NEEDED
Status: DISCONTINUED | OUTPATIENT
Start: 2018-03-14 | End: 2018-03-14 | Stop reason: SURG

## 2018-03-14 RX ORDER — SODIUM CHLORIDE, SODIUM LACTATE, POTASSIUM CHLORIDE, CALCIUM CHLORIDE 600; 310; 30; 20 MG/100ML; MG/100ML; MG/100ML; MG/100ML
125 INJECTION, SOLUTION INTRAVENOUS CONTINUOUS
Status: DISCONTINUED | OUTPATIENT
Start: 2018-03-14 | End: 2018-03-15

## 2018-03-14 RX ORDER — NEOSTIGMINE METHYLSULFATE 5 MG/5 ML
SYRINGE (ML) INTRAVENOUS AS NEEDED
Status: DISCONTINUED | OUTPATIENT
Start: 2018-03-14 | End: 2018-03-14 | Stop reason: SURG

## 2018-03-14 RX ORDER — CHLORHEXIDINE GLUCONATE 0.12 MG/ML
15 RINSE ORAL ONCE
Status: COMPLETED | OUTPATIENT
Start: 2018-03-14 | End: 2018-03-14

## 2018-03-14 RX ORDER — ONDANSETRON 2 MG/ML
4 INJECTION INTRAMUSCULAR; INTRAVENOUS AS NEEDED
Status: DISCONTINUED | OUTPATIENT
Start: 2018-03-14 | End: 2018-03-14 | Stop reason: HOSPADM

## 2018-03-14 RX ORDER — HYDROCODONE BITARTRATE AND ACETAMINOPHEN 7.5; 325 MG/1; MG/1
1 TABLET ORAL EVERY 4 HOURS PRN
Status: DISCONTINUED | OUTPATIENT
Start: 2018-03-14 | End: 2018-03-15 | Stop reason: HOSPADM

## 2018-03-14 RX ORDER — LABETALOL HYDROCHLORIDE 5 MG/ML
10 INJECTION, SOLUTION INTRAVENOUS
Status: DISCONTINUED | OUTPATIENT
Start: 2018-03-14 | End: 2018-03-14

## 2018-03-14 RX ORDER — PANTOPRAZOLE SODIUM 40 MG/10ML
INJECTION, POWDER, LYOPHILIZED, FOR SOLUTION INTRAVENOUS
Status: COMPLETED
Start: 2018-03-14 | End: 2018-03-14

## 2018-03-14 RX ORDER — GLYCOPYRROLATE 0.2 MG/ML
INJECTION INTRAMUSCULAR; INTRAVENOUS AS NEEDED
Status: DISCONTINUED | OUTPATIENT
Start: 2018-03-14 | End: 2018-03-14 | Stop reason: SURG

## 2018-03-14 RX ORDER — MORPHINE SULFATE 2 MG/ML
2 INJECTION, SOLUTION INTRAMUSCULAR; INTRAVENOUS
Status: DISCONTINUED | OUTPATIENT
Start: 2018-03-14 | End: 2018-03-14 | Stop reason: HOSPADM

## 2018-03-14 RX ORDER — NALOXONE HCL 0.4 MG/ML
0.4 VIAL (ML) INJECTION
Status: DISCONTINUED | OUTPATIENT
Start: 2018-03-14 | End: 2018-03-15 | Stop reason: HOSPADM

## 2018-03-14 RX ORDER — MEPERIDINE HYDROCHLORIDE 50 MG/ML
INJECTION INTRAMUSCULAR; INTRAVENOUS; SUBCUTANEOUS AS NEEDED
Status: DISCONTINUED | OUTPATIENT
Start: 2018-03-14 | End: 2018-03-14 | Stop reason: SURG

## 2018-03-14 RX ORDER — HYDROMORPHONE HYDROCHLORIDE 2 MG/1
2 TABLET ORAL EVERY 4 HOURS PRN
Status: DISCONTINUED | OUTPATIENT
Start: 2018-03-14 | End: 2018-03-15

## 2018-03-14 RX ORDER — MORPHINE SULFATE 1 MG/ML
4 INJECTION, SOLUTION EPIDURAL; INTRATHECAL; INTRAVENOUS
Status: DISCONTINUED | OUTPATIENT
Start: 2018-03-14 | End: 2018-03-14

## 2018-03-14 RX ORDER — ONDANSETRON 4 MG/1
4 TABLET, FILM COATED ORAL EVERY 6 HOURS PRN
Status: DISCONTINUED | OUTPATIENT
Start: 2018-03-14 | End: 2018-03-15 | Stop reason: HOSPADM

## 2018-03-14 RX ORDER — PROMETHAZINE HYDROCHLORIDE 25 MG/ML
INJECTION, SOLUTION INTRAMUSCULAR; INTRAVENOUS AS NEEDED
Status: DISCONTINUED | OUTPATIENT
Start: 2018-03-14 | End: 2018-03-14 | Stop reason: SURG

## 2018-03-14 RX ORDER — ALBUTEROL SULFATE 2.5 MG/3ML
2.5 SOLUTION RESPIRATORY (INHALATION)
Status: DISCONTINUED | OUTPATIENT
Start: 2018-03-14 | End: 2018-03-15 | Stop reason: HOSPADM

## 2018-03-14 RX ORDER — DEXAMETHASONE SODIUM PHOSPHATE 10 MG/ML
INJECTION, SOLUTION INTRAMUSCULAR; INTRAVENOUS AS NEEDED
Status: DISCONTINUED | OUTPATIENT
Start: 2018-03-14 | End: 2018-03-14 | Stop reason: SURG

## 2018-03-14 RX ORDER — CYANOCOBALAMIN 1000 UG/ML
1000 INJECTION, SOLUTION INTRAMUSCULAR; SUBCUTANEOUS ONCE
Status: COMPLETED | OUTPATIENT
Start: 2018-03-14 | End: 2018-03-14

## 2018-03-14 RX ORDER — ONDANSETRON 2 MG/ML
INJECTION INTRAMUSCULAR; INTRAVENOUS AS NEEDED
Status: DISCONTINUED | OUTPATIENT
Start: 2018-03-14 | End: 2018-03-14 | Stop reason: SURG

## 2018-03-14 RX ORDER — ALBUTEROL SULFATE 2.5 MG/3ML
2.5 SOLUTION RESPIRATORY (INHALATION) EVERY 4 HOURS PRN
Status: DISCONTINUED | OUTPATIENT
Start: 2018-03-14 | End: 2018-03-15 | Stop reason: HOSPADM

## 2018-03-14 RX ORDER — MORPHINE SULFATE 4 MG/ML
6 INJECTION, SOLUTION INTRAMUSCULAR; INTRAVENOUS
Status: DISCONTINUED | OUTPATIENT
Start: 2018-03-14 | End: 2018-03-15

## 2018-03-14 RX ORDER — MIDAZOLAM HYDROCHLORIDE 1 MG/ML
INJECTION INTRAMUSCULAR; INTRAVENOUS AS NEEDED
Status: DISCONTINUED | OUTPATIENT
Start: 2018-03-14 | End: 2018-03-14 | Stop reason: SURG

## 2018-03-14 RX ORDER — MORPHINE SULFATE 4 MG/ML
4 INJECTION, SOLUTION INTRAMUSCULAR; INTRAVENOUS
Status: DISCONTINUED | OUTPATIENT
Start: 2018-03-14 | End: 2018-03-15

## 2018-03-14 RX ORDER — SODIUM CHLORIDE 0.9 % (FLUSH) 0.9 %
1-10 SYRINGE (ML) INJECTION AS NEEDED
Status: DISCONTINUED | OUTPATIENT
Start: 2018-03-14 | End: 2018-03-14 | Stop reason: HOSPADM

## 2018-03-14 RX ORDER — DEXAMETHASONE SODIUM PHOSPHATE 4 MG/ML
INJECTION, SOLUTION INTRA-ARTICULAR; INTRALESIONAL; INTRAMUSCULAR; INTRAVENOUS; SOFT TISSUE AS NEEDED
Status: DISCONTINUED | OUTPATIENT
Start: 2018-03-14 | End: 2018-03-14 | Stop reason: SURG

## 2018-03-14 RX ORDER — DEXAMETHASONE SODIUM PHOSPHATE 10 MG/ML
INJECTION, SOLUTION INTRAMUSCULAR; INTRAVENOUS
Status: COMPLETED
Start: 2018-03-14 | End: 2018-03-14

## 2018-03-14 RX ORDER — ONDANSETRON 2 MG/ML
4 INJECTION INTRAMUSCULAR; INTRAVENOUS EVERY 6 HOURS PRN
Status: DISCONTINUED | OUTPATIENT
Start: 2018-03-14 | End: 2018-03-15 | Stop reason: HOSPADM

## 2018-03-14 RX ORDER — SCOLOPAMINE TRANSDERMAL SYSTEM 1 MG/1
1 PATCH, EXTENDED RELEASE TRANSDERMAL ONCE
Status: DISCONTINUED | OUTPATIENT
Start: 2018-03-14 | End: 2018-03-14

## 2018-03-14 RX ORDER — DIPHENHYDRAMINE HCL 25 MG
25 CAPSULE ORAL EVERY 6 HOURS PRN
Status: DISCONTINUED | OUTPATIENT
Start: 2018-03-14 | End: 2018-03-15 | Stop reason: HOSPADM

## 2018-03-14 RX ORDER — SCOLOPAMINE TRANSDERMAL SYSTEM 1 MG/1
PATCH, EXTENDED RELEASE TRANSDERMAL
Status: DISCONTINUED
Start: 2018-03-14 | End: 2018-03-15 | Stop reason: HOSPADM

## 2018-03-14 RX ORDER — SODIUM CHLORIDE AND POTASSIUM CHLORIDE 150; 450 MG/100ML; MG/100ML
125 INJECTION, SOLUTION INTRAVENOUS CONTINUOUS
Status: DISCONTINUED | OUTPATIENT
Start: 2018-03-15 | End: 2018-03-15 | Stop reason: HOSPADM

## 2018-03-14 RX ORDER — BUPIVACAINE HYDROCHLORIDE 2.5 MG/ML
INJECTION, SOLUTION EPIDURAL; INFILTRATION; INTRACAUDAL
Status: COMPLETED
Start: 2018-03-14 | End: 2018-03-14

## 2018-03-14 RX ORDER — FENTANYL CITRATE 50 UG/ML
INJECTION, SOLUTION INTRAMUSCULAR; INTRAVENOUS AS NEEDED
Status: DISCONTINUED | OUTPATIENT
Start: 2018-03-14 | End: 2018-03-14 | Stop reason: SURG

## 2018-03-14 RX ORDER — SODIUM CHLORIDE, SODIUM LACTATE, POTASSIUM CHLORIDE, CALCIUM CHLORIDE 600; 310; 30; 20 MG/100ML; MG/100ML; MG/100ML; MG/100ML
100 INJECTION, SOLUTION INTRAVENOUS CONTINUOUS
Status: DISCONTINUED | OUTPATIENT
Start: 2018-03-14 | End: 2018-03-14 | Stop reason: HOSPADM

## 2018-03-14 RX ADMIN — METOCLOPRAMIDE 10 MG: 5 INJECTION, SOLUTION INTRAMUSCULAR; INTRAVENOUS at 17:43

## 2018-03-14 RX ADMIN — FENTANYL CITRATE 100 MCG: 50 INJECTION, SOLUTION INTRAMUSCULAR; INTRAVENOUS at 11:23

## 2018-03-14 RX ADMIN — ONDANSETRON 4 MG: 2 INJECTION INTRAMUSCULAR; INTRAVENOUS at 15:59

## 2018-03-14 RX ADMIN — ALBUTEROL SULFATE 2.5 MG: 2.5 SOLUTION RESPIRATORY (INHALATION) at 18:51

## 2018-03-14 RX ADMIN — MORPHINE SULFATE 4 MG: 4 INJECTION, SOLUTION INTRAMUSCULAR; INTRAVENOUS at 21:10

## 2018-03-14 RX ADMIN — SODIUM CHLORIDE, POTASSIUM CHLORIDE, SODIUM LACTATE AND CALCIUM CHLORIDE: 600; 310; 30; 20 INJECTION, SOLUTION INTRAVENOUS at 12:00

## 2018-03-14 RX ADMIN — Medication 3 MG: at 12:34

## 2018-03-14 RX ADMIN — GLYCOPYRROLATE 0.6 MG: 0.2 INJECTION, SOLUTION INTRAMUSCULAR; INTRAVENOUS at 12:34

## 2018-03-14 RX ADMIN — PROMETHAZINE HYDROCHLORIDE 12.5 MG: 25 INJECTION INTRAMUSCULAR; INTRAVENOUS at 11:35

## 2018-03-14 RX ADMIN — PROPOFOL 300 MG: 10 INJECTION, EMULSION INTRAVENOUS at 11:33

## 2018-03-14 RX ADMIN — ROCURONIUM BROMIDE 5 MG: 10 INJECTION INTRAVENOUS at 11:32

## 2018-03-14 RX ADMIN — MIDAZOLAM HYDROCHLORIDE 2 MG: 1 INJECTION, SOLUTION INTRAMUSCULAR; INTRAVENOUS at 11:19

## 2018-03-14 RX ADMIN — SODIUM CHLORIDE, POTASSIUM CHLORIDE, SODIUM LACTATE AND CALCIUM CHLORIDE 150 ML/HR: 600; 310; 30; 20 INJECTION, SOLUTION INTRAVENOUS at 15:58

## 2018-03-14 RX ADMIN — PANTOPRAZOLE SODIUM 40 MG: 40 INJECTION, POWDER, LYOPHILIZED, FOR SOLUTION INTRAVENOUS at 08:50

## 2018-03-14 RX ADMIN — DEXAMETHASONE SODIUM PHOSPHATE 5 MG: 10 INJECTION, SOLUTION INTRAMUSCULAR; INTRAVENOUS at 11:39

## 2018-03-14 RX ADMIN — SODIUM CHLORIDE, POTASSIUM CHLORIDE, SODIUM LACTATE AND CALCIUM CHLORIDE 1000 ML: 600; 310; 30; 20 INJECTION, SOLUTION INTRAVENOUS at 08:35

## 2018-03-14 RX ADMIN — HYDROMORPHONE HYDROCHLORIDE 0.5 MG: 1 INJECTION, SOLUTION INTRAMUSCULAR; INTRAVENOUS; SUBCUTANEOUS at 13:48

## 2018-03-14 RX ADMIN — DEXAMETHASONE SODIUM PHOSPHATE 8 MG: 4 INJECTION, SOLUTION INTRAMUSCULAR; INTRAVENOUS at 11:35

## 2018-03-14 RX ADMIN — SCOLOPAMINE TRANSDERMAL SYSTEM 1 PATCH: 1 PATCH, EXTENDED RELEASE TRANSDERMAL at 08:49

## 2018-03-14 RX ADMIN — DIPHENHYDRAMINE HYDROCHLORIDE 25 MG: 50 INJECTION INTRAMUSCULAR; INTRAVENOUS at 11:35

## 2018-03-14 RX ADMIN — CEFAZOLIN SODIUM 2 G: 2 SOLUTION INTRAVENOUS at 21:09

## 2018-03-14 RX ADMIN — MEPERIDINE HYDROCHLORIDE 25 MG: 50 INJECTION INTRAMUSCULAR; INTRAVENOUS; SUBCUTANEOUS at 12:55

## 2018-03-14 RX ADMIN — ROCURONIUM BROMIDE 35 MG: 10 INJECTION INTRAVENOUS at 11:33

## 2018-03-14 RX ADMIN — MEPERIDINE HYDROCHLORIDE 50 MG: 50 INJECTION INTRAMUSCULAR; INTRAVENOUS; SUBCUTANEOUS at 12:41

## 2018-03-14 RX ADMIN — SCOPALAMINE 1 PATCH: 1 PATCH, EXTENDED RELEASE TRANSDERMAL at 08:49

## 2018-03-14 RX ADMIN — CYANOCOBALAMIN 1000 MCG: 1000 INJECTION, SOLUTION INTRAMUSCULAR at 17:43

## 2018-03-14 RX ADMIN — CHLORHEXIDINE GLUCONATE 15 ML: 1.2 RINSE ORAL at 08:48

## 2018-03-14 RX ADMIN — HYDROMORPHONE HYDROCHLORIDE 0.5 MG: 1 INJECTION, SOLUTION INTRAMUSCULAR; INTRAVENOUS; SUBCUTANEOUS at 14:14

## 2018-03-14 RX ADMIN — SODIUM CHLORIDE, POTASSIUM CHLORIDE, SODIUM LACTATE AND CALCIUM CHLORIDE 100 ML/HR: 600; 310; 30; 20 INJECTION, SOLUTION INTRAVENOUS at 09:04

## 2018-03-14 RX ADMIN — DEXAMETHASONE SODIUM PHOSPHATE 5 MG: 10 INJECTION, SOLUTION INTRAMUSCULAR; INTRAVENOUS at 11:37

## 2018-03-14 RX ADMIN — PANTOPRAZOLE SODIUM 40 MG: 40 INJECTION, POWDER, FOR SOLUTION INTRAVENOUS at 08:50

## 2018-03-14 RX ADMIN — BUPIVACAINE HYDROCHLORIDE 30 ML: 2.5 INJECTION, SOLUTION EPIDURAL; INFILTRATION; INTRACAUDAL; PERINEURAL at 11:37

## 2018-03-14 RX ADMIN — SODIUM CHLORIDE, POTASSIUM CHLORIDE, SODIUM LACTATE AND CALCIUM CHLORIDE 150 ML/HR: 600; 310; 30; 20 INJECTION, SOLUTION INTRAVENOUS at 22:19

## 2018-03-14 RX ADMIN — PROPOFOL 50 MCG/KG/MIN: 10 INJECTION, EMULSION INTRAVENOUS at 11:40

## 2018-03-14 RX ADMIN — MORPHINE SULFATE 4 MG: 4 INJECTION, SOLUTION INTRAMUSCULAR; INTRAVENOUS at 15:58

## 2018-03-14 RX ADMIN — FENTANYL CITRATE 50 MCG: 50 INJECTION, SOLUTION INTRAMUSCULAR; INTRAVENOUS at 12:41

## 2018-03-14 RX ADMIN — ONDANSETRON 4 MG: 2 INJECTION INTRAMUSCULAR; INTRAVENOUS at 11:35

## 2018-03-14 RX ADMIN — LIDOCAINE HYDROCHLORIDE 80 MG: 20 INJECTION, SOLUTION INTRAVENOUS at 11:33

## 2018-03-14 RX ADMIN — FENTANYL CITRATE 150 MCG: 50 INJECTION, SOLUTION INTRAMUSCULAR; INTRAVENOUS at 11:33

## 2018-03-14 RX ADMIN — BUPIVACAINE HYDROCHLORIDE 30 ML: 2.5 INJECTION, SOLUTION EPIDURAL; INFILTRATION; INTRACAUDAL; PERINEURAL at 11:39

## 2018-03-14 NOTE — ANESTHESIA POSTPROCEDURE EVALUATION
Patient: Zofia Ortiz    Procedure Summary     Date:  03/14/18 [facility] / [facility]    Anesthesia Start:  1118 Anesthesia Stop:  1308    Procedures:       GASTRIC SLEEVE LAPAROSCOPIC (N/A Abdomen)      ESOPHAGOGASTRODUODENOSCOPY (N/A Esophagus) Diagnosis:       Obesity, Class III, BMI 40-49.9 (morbid obesity)      (Obesity, Class III, BMI 40-49.9 (morbid obesity) [E66.01])    Surgeon:  Glen Blackmon MD Provider:  Osvaldo Jameson CRNA    Anesthesia Type:  general ASA Status:  3          Anesthesia Type: general  Last vitals  BP   134/87 (03/14/18 1500)   Temp   97.7 °F (36.5 °C) (03/14/18 1500)   Pulse   61 (03/14/18 1500)   Resp   18 (03/14/18 1500)     SpO2   93 % (03/14/18 1500)     Post Anesthesia Care and Evaluation    Patient location during evaluation: PACU  Patient participation: complete - patient participated  Level of consciousness: awake and sleepy but conscious  Pain management: adequate  Airway patency: patent  Anesthetic complications: No anesthetic complications  PONV Status: none  Cardiovascular status: acceptable  Respiratory status: acceptable, face mask, spontaneous ventilation and nonlabored ventilation  Hydration status: acceptable

## 2018-03-14 NOTE — OP NOTE
Preoperative Diagnosis:   Morbid Obesity with Multiple Co-Morbidities    Postoperative Diagnosis:   Same    Procedure:                                                      Laparoscopic Sleeve Gastrectomy (85% subtotal vertical gastrectomy) over a 36 Romanian Bougie Dilator                                                                        EGD    Surgeon:                                                       THERESA Blackmon MD    Anesthesia:                                                   GETA    EBL:                                                              50 cc    Fluids:                                                           Crystalloid    Specimens:                                                   Subtotal gastrectomy    Drains:                                                           None    Counts:                                                          Correct    Complications:                                               None    Indications:   This is a 38-year-old morbidly obese white female who presents for elective laparoscopic sleeve gastrectomy.  She's undergone extensive preoperative education teaching and consent process everything's in order and she wishes to proceed.    Operative technique:     The patient was brought to the operating room, and placed supine upon the operating room table.  SCD hose were placed, she underwent uneventful general endotracheal anesthesia per the anesthesiology staff, she received IV Ancef and subcutaneous Lovenox, the anesthesiology staff performed a tap block, and her abdomen was prepped and draped with ChloraPrep in a sterile fashion, an Ioban was used as well, a Mckenzie catheter was not placed.    The peritoneal cavity was entered in the upper abdomen to the left of midline using an 5 mm trocar and an Optiview technique and the abdomen was insufflated to a pressure of 15 mmHg with CO2 gas.  Exploratory laparoscopy revealed no evidence of injury from the  entrance technique, a enlarged smooth fatty appearing liver that extended well over the spleen on the left, a normal appearing fundus of the gallbladder, no other abnormalities noted.  Remaining trocars were placed under direct visualization including two additional 5 mm trocars on the left, one on the right, and to the right of the umbilicus a 15 mm trocar was placed off the midline.  Through a stab incision in the epigastrium and Jocy retractors used to elevate the left lobe of the liver exposing the hiatus.  There was no visible hiatal hernia from the anterior view and this was photodocumented.  Beginning approximately two thirds of the way around the greater curvature the stomach, the gastrocolic vessels were divided with the Harmonic scalpel.  This proceeded proximally taking down all the short gastric vessels and exposing the left gigi.  Her were no posterior hernias or lipomas and this was photodocumented.  Gastrocolic vessels were then divided medially to 5 cm proximal to the pylorus.  Some of the filmy attachments of the posterior stomach to the pancreas and retroperitoneum were divided.  The anesthesiology staff passed a 36 Surinamese blunt tip bougie dilator which was manipulated along the lesser curvature into the distal antrum.  The 85% subtotal vertical sleeve gastrectomy was then performed over the 36 Surinamese bougie dilator using an echelon 60 mm articulating electric GST stapler.  The first firing was a black load, the next 4 firings were green loads.  All staple loads included a single absorbable Veritas janine-strip.  After clamping down the final green load and prior to firing it the bougie dilator was removed.  The sleeve was performed such that it was uniform in size, no hourglassing or narrowing, especially at the angularis, and the final firing was done a centimeter away from the angle of His to hopefully avoid incorporating esophageal fibers.  A figure of 8 2-0 vicryl plus suture was placed  around the apex of the staple line. The subtotal gastrectomy specimen was placed into a large retrieval bag and withdrawn and placed on a separate Bond stand, it was a slightly smaller than average size specimen.  The sleeve was submerged under saline.  Upper endoscopy was performed, and the endoscope was advanced into the duodenal bulb.  No air bubbles or leak seen, no bleeding at the staple line, no narrowing at the angularis, no pyloric spasm or deformity, no gastritis, no hiatal hernia or Garcia's esophagus, and the endoscope was withdrawn.  The subtotal gastrectomy specimen was inspected, staples were all well formed confirming laparoscopic findings, it was sent unopened to pathology for permanent section.  Irrigation fluid was suctioned free.  The sleeve was resting nicely and hemostatic.  The mid sleeve was tacked to the surrounding omentum with a figure of 8 2-0 vicryl plus suture. The sleeve staple line was treated with 4 cc of aerosolized Tisseel fibrin glue. The Jocy retractor was removed.  Fascia at the 15 mm trocar site incision was closed with a horizontal mattress 0 Vicryl suture placed with a suture passer under direct visualization and tying the knot extracorporeally. There was still a small residual fascial defect and this was closed by placing another 0-vicryl simple interrupted suture with the suture passer.   Remaining trochars were removed under direct visualization, no bleeding noted from their sites.  Subcutaneous tissue in the 15 mm incision was closed with a figure-of-eight 2-0 Vicryl plus suture, and skin in each incision was closed using 3-0 Monocryl plus in an interrupted subcuticular stitch followed by skin-a-fix.  The patient tolerated the procedure well without complication, was taken to the recovery room in stable condition.

## 2018-03-14 NOTE — ANESTHESIA PROCEDURE NOTES
Peripheral Block    Patient location during procedure: OR  Start time: 3/14/2018 11:35 AM  Stop time: 3/14/2018 11:42 AM  Reason for block: at surgeon's request and post-op pain management  Performed by  CRNA: AVINASH FRANKEL  Preanesthetic Checklist  Completed: patient identified, site marked, surgical consent, pre-op evaluation, timeout performed, IV checked, risks and benefits discussed and monitors and equipment checked  Prep:  Pt Position: supine  Sterile barriers:cap, gloves, mask and sterile barriers  Prep: ChloraPrep and air dry 3 min  Patient monitoring: blood pressure monitoring, continuous pulse oximetry and EKG  Procedure  Sedation:no  Performed under: general  Guidance:ultrasound guided  Images:still images obtained    Laterality:Bilateral  Block Type:TAP  Injection Technique:single-shot  Needle Type:echogenic  Needle Gauge:18 G    Medications  Comment:30 ml marcaine .25% with decadron 5 mg per side, total volume 60 ml  Local Injected:bupivacaine 0.25% Local Amount Injected:60mL  Post Assessment  Injection Assessment: negative aspiration for heme, no paresthesia on injection and incremental injection  Patient Tolerance:comfortable throughout block  Complications:no

## 2018-03-14 NOTE — BRIEF OP NOTE
GASTRIC SLEEVE LAPAROSCOPIC, ESOPHAGOGASTRODUODENOSCOPY  Progress Note    Zofia Ortiz  3/14/2018    Pre-op Diagnosis:   Obesity, Class III, BMI 40-49.9 (morbid obesity) [E66.01]       Post-Op Diagnosis Codes:     * Obesity, Class III, BMI 40-49.9 (morbid obesity) [E66.01]    Procedure/CPT® Codes:  OK LAP, GIGI RESTRICT PROC, LONGITUDINAL GASTRECTOMY [93858]  OK ESOPHAGOGASTRODUODENOSCOPY TRANSORAL DIAGNOSTIC [08445]    Procedure(s):  GASTRIC SLEEVE LAPAROSCOPIC  ESOPHAGOGASTRODUODENOSCOPY    Surgeon(s):  Glen Blackmon MD    Anesthesia: General with Block    Staff:   Circulator: Mary Bo RN; Yuliet Kessler RN; Nataile Carty  Scrub Person: Yordy Bass; Jeremy Beckman    Estimated Blood Loss: minimal    Urine Voided: * No values recorded between 3/14/2018 11:18 AM and 3/14/2018 12:38 PM *    Specimens:                ID Type Source Tests Collected by Time   A : SUB-TOTAL GASTRECTOMY Tissue Stomach TISSUE PATHOLOGY EXAM Yuliet Kessler RN 3/14/2018 1203         Drains:      Findings:     Complications: none      Glen Blackmon MD     Date: 3/14/2018  Time: 12:38 PM

## 2018-03-14 NOTE — ANESTHESIA PROCEDURE NOTES
Airway  Urgency: elective    Airway not difficult    General Information and Staff    Patient location during procedure: OR  CRNA: MAXIMO VILLALTA    Indications and Patient Condition  Indications for airway management: airway protection    Preoxygenated: yes  Mask difficulty assessment: 1 - vent by mask    Final Airway Details  Final airway type: endotracheal airway      Successful airway: ETT  Cuffed: yes   Successful intubation technique: direct laryngoscopy  Facilitating devices/methods: intubating stylet and cricoid pressure  Endotracheal tube insertion site: oral  Blade: Banuelos  Blade size: #2  ETT size: 7.5 mm  Cormack-Lehane Classification: grade I - full view of glottis  Placement verified by: chest auscultation, capnometry and palpation of cuff   Measured from: lips  ETT to lips (cm): 21  Number of attempts at approach: 1    Additional Comments  RSI with adequate Cricoid pressure applied. Dentition and Lips as pre-induction. ETT cuff inflated to minimal occlusive pressure.

## 2018-03-14 NOTE — H&P (VIEW-ONLY)
[facility]  [address]  [facility] [address]  [phone]      Patient  Name:  Zofia Ortiz.  :  1980      Date of Visit: 3/6/2018      Chief Complaint:  weight gain; unable to maintain weight loss.  Preop LSG    History of Present Illness:  Zofia Ortiz is a 38 y.o. female who presents today for evaluation, education and consultation regarding weight loss surgery. The patient is interested in sleeve gastrectomy     Zofia has been overweight for at least 11 years, has been 35 pounds or more overweight for at least 10 years, has been 100 pounds or more overweight for 10 or more years and started dieting at age 14.     Previous diet attempts include: Body for Life/Gerardo Giordano, High Protein, Low Carbohydrate, Low Fat, [facility], Calorie Counting, Kasie's Diet, Cabbage Soup, Fasting and Slim Fast; Horizon weight loss, figure weight loss and pound matters; Amphetamines.  The most weight Zofia lost was 75 pounds on exercise and diet but was only able to maintain that weight loss for 3-4 years.  Her maximum lifetime weight is 265 pounds.    The patient returns for final visit prior to LSG.  Original intake evaluation LC  reviewed.  The patient has had issues with morbid obesity for years and only temporary success with non-surgical methods of weight loss.  The patient is seeking LSG to help with the morbid obesity related conditions of dyspepsia, CHAVEZ, fatigue, vit D defic, elevated transaminases, abnormal glucose.  I told her incr risk of delayed LSG leak with mae mazariegos. Says she will quit until surgery and 6 wks after to hopefully minimize the risk of leak.      Past Medical History:   Diagnosis Date   • Abnormal glucose    • Dyspepsia     rare heartburn sx's, serum h. pyl neg   • Dyspnea on exertion    • Elevated BP     156/106 @ Intake, denies hx HTN.  159/88 today.  Reviewed her BP in outlying preop diet visits, all normal range   •  Elevated transaminase level    • Fatigue    • Former smoker     Vapes now   • Morbid obesity    •  (normal spontaneous vaginal delivery)     x 1 w/o complic   • Vitamin D deficiency      Past Surgical History:   Procedure Laterality Date   •  SECTION WITH TUBAL     • KNEE ARTHROSCOPY Right    Denies wound infxn/prev blood transfusions. No GB sx's.    No Known Allergies      Current Outpatient Prescriptions:   •  diphenhydrAMINE (BENADRYL) 25 mg capsule, Take 25 mg by mouth Every 6 (Six) Hours As Needed for Sleep., Disp: , Rfl:   •  Multiple Vitamins-Minerals (DAILY MULTIVITAMIN PO), Take  by mouth., Disp: , Rfl:     Social History     Social History   • Marital status:      Spouse name: Bruce Ortiz   • Number of children: 2   • Years of education: Bachelor's     Occupational History   • Homemaker      Social History Main Topics   • Smoking status: Former Smoker     Packs/day: 0.50     Years: 8.00     Quit date:    • Smokeless tobacco: Never Used      Comment: uses vape w/ small amount of nicotine daily-smokeless   • Alcohol use Yes      Comment: 2-3 drinks weekly 2-3 drinks each time    • Drug use: No   • Sexual activity: Yes     Partners: Male      Comment:       Other Topics Concern   • Not on file     Social History Narrative    Lives in [place], [place] w/  and 2 children.  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     Family History   Problem Relation Age of Onset   • Hypertension Mother    • No Known Problems Father    • No Known Problems Sister    • Obesity Maternal Grandmother    • Hypertension Maternal Grandmother    • Diabetes Maternal Grandmother    • Melanoma Maternal Grandfather        Review of Systems:  Constitutional:  The patient reports fatigue, weight gain and denies fevers and chills.  Cardiovascular:  The patient denies HTN, HLD, heart disease and DVT.  Respiratory:  The patient denies asthma, apnea and PE.  Gastrointestinal:  The patient denies heartburn, gallbladder  issues, or liver disease.  Genitourinary:  The patient denies renal insufficiency.    Musculoskeletal:  The patient denies joint pain, fibromyalgia and arthritis.  Neurological:  The patient denies seizure and stroke.  Psychiatric:  The patient denies anxiety, depression and bipolar disorder.  Endocrine:  The patient denies diabetes and thyroid disease.  Hematologic:  The patient denies anemia and bleeding disorder.  Skin:  The patient denies MRSA.    Physical Exam:  Vital Signs:  Weight: 115 kg (253 lb 8 oz)   Body mass index is 40.92 kg/(m^2).  Temp: 97.9 °F (36.6 °C)   Heart Rate: 88   BP: 159/88     Physical Exam   Constitutional: She is oriented to person, place, and time. She appears well-developed and well-nourished.   HENT:   Head: Normocephalic and atraumatic.   Eyes: Conjunctivae are normal. No scleral icterus.   Neck: Neck supple. Carotid bruit is not present. No thyromegaly present.   Cardiovascular: Normal rate and regular rhythm.    No murmur heard.  Pulmonary/Chest: Effort normal and breath sounds normal. No respiratory distress. She has no wheezes. She has no rales.   Abdominal: Soft. Bowel sounds are normal. She exhibits no distension and no mass. There is no tenderness. No hernia.       Scars:  pfannenstiel   Musculoskeletal: Normal range of motion. She exhibits no edema.   Neurological: She is alert and oriented to person, place, and time. Gait normal.   Skin: Skin is warm and dry. No rash noted.   Psychiatric: She has a normal mood and affect. Judgment normal.   Vitals reviewed.         Patient Active Problem List   Diagnosis   • Vitamin D deficiency   • Morbid obesity   • Fatigue   • Dyspepsia   • Dyspnea on exertion   • Former smoker   • Elevated BP   Psych Brown 8/17 approp  Shyam - Phenterimine x 9    Assessment:    Zofia Ortiz is a 38 y.o. year old female with medically complicated obesity pursuing sleeve gastrectomy.    Weight loss surgery is deemed medically necessary given the following  "obesity related comorbidities including possible HTN with current Weight: 115 kg (253 lb 8 oz) and Body mass index is 40.92 kg/(m^2)..    Patient is aware that surgery is a tool, and that weight loss is not guaranteed but only seen in the context of appropriate use, follow up and exercise.    The patient was present for an approximately a 2.5 hour discussion of the purpose of weight loss surgery, how WLS is a tool to assist in achieving weight loss goals, the most common complications and how best to avoid them, and the strategies for short and long term weight loss.  Ample opportunity to discuss questions was available both in group and during the time of individual examination.    I reviewed all available preop labs, Xrays, tests, clearances, etc and signed off on these in the record.  All of this in addition to the patient's unique history and exam has been taken into consideration in determining their appropriate candidacy for weight loss surgery.    Complications  of laparoscopic/possible robotic gastric sleeve were discussed. The patient is well aware of the potential complications of surgery that include but not limited to bleeding, infections, deep venous thrombosis, pulmonary embolism, pulmonary complications such as pneumonia, cardiac events, hernias, small bowel obstruction, damage to the spleen or other organs, bowel injury, disfiguring scars, failure to lose weight, need for additional surgery, conversion to an open procedure, and death. Patient is also aware of complications which apply in this particular procedure that can include but are not limited to a \"leak\" at the staple line which in some instances may require conversion to gastric bypass.    The patient is aware if a hiatal hernia is encountered, it likely will be repaired.  R/B/A Rx to hiatal hernia repair were discussed as outlined in our long consent form.  Briefly risks in addition to those for LSG include recurrent hernia, MILLER, dysphagia, " esophageal injury, pneumothorax, injury to the vagus nerves, injury to the thoracic duct, aorta or vena cava.    Greater than 3 minutes was spent with the patient discussing avoiding all tobacco products and second hand smoke at least 2 weeks pre-operatively and 6 weeks post-operatively to minimize the risk of sleeve leak.  This included discussing the importance of avoiding even secondhand smoke as the risk of leak is increased.  Examples discussed:  I made it very clear that the patient understands they should avoid even riding in a car where someone has previously smoked in the last 2 weeks, living in a house where someone smokes (even if it's in a separate room/patio/attached garage, etc.) we discussed that they should not have a conversation with a group of people who are smoking even if it's outside.  They can be around wood burning fires and barbecue.  I told them I do not know if marijuana has a same effects but my overall recommendation is to avoid it for 2 weeks prior in 6 weeks after surgery.  They also are aware that nicotine may also increase the risk of leak and I strongly encouraged him to avoid that as well for 2 weeks prior in 6 weeks after surgery.    Discussed the risks, benefits and alternative therapies at great length as outlined in our extensive consent forms, consent videos, and educational teaching process under the direction of the center's .    A copy of the patient's signed informed consent is on file.    Plan:  Wants to do at [facility] next week.  Liver bx if deemed appropl    Patient understands that bariatric surgery is not cosmetic surgery but rather a tool to help make a lifelong commitment to lifestyle changes including diet, exercise, behavior modifications, and healthy habits.      Glen Blackmon MD

## 2018-03-14 NOTE — PLAN OF CARE
Problem: Patient Care Overview  Goal: Plan of Care Review  Outcome: Ongoing (interventions implemented as appropriate)   03/14/18 0706   Coping/Psychosocial   Plan of Care Reviewed With patient;spouse   Plan of Care Review   Progress no change   OTHER   Outcome Summary new admission, VSS, ongoing pain control, ambulating in hallway, IS encouraged.

## 2018-03-14 NOTE — ANESTHESIA PREPROCEDURE EVALUATION
Anesthesia Evaluation     Patient summary reviewed and Nursing notes reviewed   no history of anesthetic complications:  NPO Solid Status: > 8 hours  NPO Liquid Status: > 8 hours           Airway   Mallampati: II  TM distance: >3 FB  Possible difficult intubation and No difficulty expected  Dental - normal exam   (+) upper dentures    Pulmonary    (+) sleep apnea ( ? johnny snores), decreased breath sounds,   (-) shortness of breath, not a smoker  Cardiovascular - normal exam    ECG reviewed    (-) CHAVEZ      Neuro/Psych  GI/Hepatic/Renal/Endo    (+) obesity, morbid obesity,      Musculoskeletal     (+) arthralgias,   Abdominal  - normal exam  (+) obese,    Substance History      OB/GYN    (-)  Pregnant        Other        ROS/Med Hx Other: Labs reviewed  cxr nad  ekg sr with sa  Gluc 116                Anesthesia Plan    ASA 3     general   (Risks and benefits of general anesthesia discussed including risk of aspiration, recall and dental damage. All patient questions answered.  Patient told a breathing tube will be used to manage the airway.  Plan bilateral TAP blocks after induction.    Will continue with plan of care.)  intravenous induction   Anesthetic plan and risks discussed with patient.

## 2018-03-15 ENCOUNTER — APPOINTMENT (OUTPATIENT)
Dept: GENERAL RADIOLOGY | Facility: HOSPITAL | Age: 38
End: 2018-03-15
Attending: SURGERY

## 2018-03-15 ENCOUNTER — APPOINTMENT (OUTPATIENT)
Dept: CARDIOLOGY | Facility: HOSPITAL | Age: 38
End: 2018-03-15
Attending: INTERNAL MEDICINE

## 2018-03-15 VITALS
BODY MASS INDEX: 39.21 KG/M2 | HEART RATE: 54 BPM | OXYGEN SATURATION: 99 % | DIASTOLIC BLOOD PRESSURE: 91 MMHG | TEMPERATURE: 97.5 F | RESPIRATION RATE: 16 BRPM | WEIGHT: 249.8 LBS | SYSTOLIC BLOOD PRESSURE: 144 MMHG | HEIGHT: 67 IN

## 2018-03-15 LAB
ALBUMIN SERPL-MCNC: 3.8 G/DL (ref 3.5–5)
ALBUMIN/GLOB SERPL: 1.5 G/DL (ref 1–2)
ALP SERPL-CCNC: 73 U/L (ref 38–126)
ALT SERPL W P-5'-P-CCNC: 105 U/L (ref 13–69)
ANION GAP SERPL CALCULATED.3IONS-SCNC: 19.2 MMOL/L
AST SERPL-CCNC: 41 U/L (ref 15–46)
BASOPHILS # BLD AUTO: 0.02 10*3/MM3 (ref 0–0.2)
BASOPHILS NFR BLD AUTO: 0.1 % (ref 0–2.5)
BH CV ECHO MEAS - % IVS THICK: 33.3 %
BH CV ECHO MEAS - % LVPW THICK: 44.4 %
BH CV ECHO MEAS - AO ACC SLOPE: 1484 CM/SEC^2
BH CV ECHO MEAS - AO ACC TIME: 0.08 SEC
BH CV ECHO MEAS - AO MAX PG (FULL): 4.5 MMHG
BH CV ECHO MEAS - AO MAX PG: 9 MMHG
BH CV ECHO MEAS - AO MEAN PG (FULL): 1.9 MMHG
BH CV ECHO MEAS - AO MEAN PG: 4 MMHG
BH CV ECHO MEAS - AO ROOT AREA (BSA CORRECTED): 1.3
BH CV ECHO MEAS - AO ROOT AREA: 6.6 CM^2
BH CV ECHO MEAS - AO ROOT DIAM: 2.9 CM
BH CV ECHO MEAS - AO V2 MAX: 146.6 CM/SEC
BH CV ECHO MEAS - AO V2 MEAN: 90.2 CM/SEC
BH CV ECHO MEAS - AO V2 VTI: 31.8 CM
BH CV ECHO MEAS - AVA(I,A): 2.3 CM^2
BH CV ECHO MEAS - AVA(I,D): 2.3 CM^2
BH CV ECHO MEAS - AVA(V,A): 2 CM^2
BH CV ECHO MEAS - AVA(V,D): 2 CM^2
BH CV ECHO MEAS - BSA(HAYCOCK): 2.3 M^2
BH CV ECHO MEAS - BSA: 2.2 M^2
BH CV ECHO MEAS - BZI_BMI: 38.4 KILOGRAMS/M^2
BH CV ECHO MEAS - BZI_METRIC_HEIGHT: 170.2 CM
BH CV ECHO MEAS - BZI_METRIC_WEIGHT: 111.1 KG
BH CV ECHO MEAS - CONTRAST EF 4CH: 57.4 ML/M^2
BH CV ECHO MEAS - EDV(CUBED): 103 ML
BH CV ECHO MEAS - EDV(MOD-SP4): 68 ML
BH CV ECHO MEAS - EDV(TEICH): 101.7 ML
BH CV ECHO MEAS - EF(CUBED): 72.4 %
BH CV ECHO MEAS - EF(MOD-SP4): 57.4 %
BH CV ECHO MEAS - EF(TEICH): 64.1 %
BH CV ECHO MEAS - ESV(CUBED): 28.4 ML
BH CV ECHO MEAS - ESV(MOD-SP4): 29 ML
BH CV ECHO MEAS - ESV(TEICH): 36.5 ML
BH CV ECHO MEAS - FS: 34.9 %
BH CV ECHO MEAS - IVS/LVPW: 1.1
BH CV ECHO MEAS - IVSD: 1.1 CM
BH CV ECHO MEAS - IVSS: 1.5 CM
BH CV ECHO MEAS - LA DIMENSION: 4 CM
BH CV ECHO MEAS - LA/AO: 1.4
BH CV ECHO MEAS - LV DIASTOLIC VOL/BSA (35-75): 30.8 ML/M^2
BH CV ECHO MEAS - LV MASS(C)D: 171.7 GRAMS
BH CV ECHO MEAS - LV MASS(C)DI: 77.9 GRAMS/M^2
BH CV ECHO MEAS - LV MASS(C)S: 149.8 GRAMS
BH CV ECHO MEAS - LV MASS(C)SI: 67.9 GRAMS/M^2
BH CV ECHO MEAS - LV MAX PG: 4.5 MMHG
BH CV ECHO MEAS - LV MEAN PG: 2.1 MMHG
BH CV ECHO MEAS - LV SYSTOLIC VOL/BSA (12-30): 13.2 ML/M^2
BH CV ECHO MEAS - LV V1 MAX: 105.6 CM/SEC
BH CV ECHO MEAS - LV V1 MEAN: 65.6 CM/SEC
BH CV ECHO MEAS - LV V1 VTI: 26.8 CM
BH CV ECHO MEAS - LVIDD: 4.7 CM
BH CV ECHO MEAS - LVIDS: 3.1 CM
BH CV ECHO MEAS - LVLD AP4: 7.5 CM
BH CV ECHO MEAS - LVLS AP4: 6.8 CM
BH CV ECHO MEAS - LVOT AREA (M): 2.8 CM^2
BH CV ECHO MEAS - LVOT AREA: 2.7 CM^2
BH CV ECHO MEAS - LVOT DIAM: 1.9 CM
BH CV ECHO MEAS - LVPWD: 0.98 CM
BH CV ECHO MEAS - LVPWS: 1.4 CM
BH CV ECHO MEAS - MV A MAX VEL: 71.1 CM/SEC
BH CV ECHO MEAS - MV DEC TIME: 0.11 SEC
BH CV ECHO MEAS - MV E MAX VEL: 95.8 CM/SEC
BH CV ECHO MEAS - MV E/A: 1.3
BH CV ECHO MEAS - PA ACC SLOPE: 460.6 CM/SEC^2
BH CV ECHO MEAS - PA ACC TIME: 0.15 SEC
BH CV ECHO MEAS - PA PR(ACCEL): 12.5 MMHG
BH CV ECHO MEAS - PI END-D VEL: 96.4 CM/SEC
BH CV ECHO MEAS - RVDD: 2.8 CM
BH CV ECHO MEAS - SI(AO): 95.6 ML/M^2
BH CV ECHO MEAS - SI(CUBED): 33.8 ML/M^2
BH CV ECHO MEAS - SI(LVOT): 33.3 ML/M^2
BH CV ECHO MEAS - SI(MOD-SP4): 17.7 ML/M^2
BH CV ECHO MEAS - SI(TEICH): 29.6 ML/M^2
BH CV ECHO MEAS - SV(AO): 210.8 ML
BH CV ECHO MEAS - SV(CUBED): 74.5 ML
BH CV ECHO MEAS - SV(LVOT): 73.3 ML
BH CV ECHO MEAS - SV(MOD-SP4): 39 ML
BH CV ECHO MEAS - SV(TEICH): 65.2 ML
BH CV ECHO MEAS - TR MAX VEL: 198.6 CM/SEC
BILIRUB SERPL-MCNC: 0.3 MG/DL (ref 0.2–1.3)
BUN BLD-MCNC: 11 MG/DL (ref 7–20)
BUN/CREAT SERPL: 18.3 (ref 7.1–23.5)
CALCIUM SPEC-SCNC: 8.7 MG/DL (ref 8.4–10.2)
CHLORIDE SERPL-SCNC: 104 MMOL/L (ref 98–107)
CO2 SERPL-SCNC: 25 MMOL/L (ref 26–30)
CREAT BLD-MCNC: 0.6 MG/DL (ref 0.6–1.3)
DEPRECATED RDW RBC AUTO: 42.5 FL (ref 37–54)
EOSINOPHIL # BLD AUTO: 0 10*3/MM3 (ref 0–0.7)
EOSINOPHIL NFR BLD AUTO: 0 % (ref 0–7)
ERYTHROCYTE [DISTWIDTH] IN BLOOD BY AUTOMATED COUNT: 12.2 % (ref 11.5–14.5)
GFR SERPL CREATININE-BSD FRML MDRD: 112 ML/MIN/1.73
GLOBULIN UR ELPH-MCNC: 2.6 GM/DL
GLUCOSE BLD-MCNC: 120 MG/DL (ref 74–98)
HCT VFR BLD AUTO: 40.4 % (ref 37–47)
HGB BLD-MCNC: 13.5 G/DL (ref 12–16)
IMM GRANULOCYTES # BLD: 0.05 10*3/MM3 (ref 0–0.06)
IMM GRANULOCYTES NFR BLD: 0.4 % (ref 0–0.6)
IRON 24H UR-MRATE: 43 MCG/DL (ref 37–181)
LYMPHOCYTES # BLD AUTO: 1.45 10*3/MM3 (ref 0.6–3.4)
LYMPHOCYTES NFR BLD AUTO: 10.4 % (ref 10–50)
MAXIMAL PREDICTED HEART RATE: 182 BPM
MCH RBC QN AUTO: 31.4 PG (ref 27–31)
MCHC RBC AUTO-ENTMCNC: 33.4 G/DL (ref 30–37)
MCV RBC AUTO: 94 FL (ref 81–99)
MONOCYTES # BLD AUTO: 0.95 10*3/MM3 (ref 0–0.9)
MONOCYTES NFR BLD AUTO: 6.8 % (ref 0–12)
NEUTROPHILS # BLD AUTO: 11.53 10*3/MM3 (ref 2–6.9)
NEUTROPHILS NFR BLD AUTO: 82.3 % (ref 37–80)
NRBC BLD MANUAL-RTO: 0 /100 WBC (ref 0–0)
PLATELET # BLD AUTO: 321 10*3/MM3 (ref 130–400)
PMV BLD AUTO: 9.3 FL (ref 6–12)
POTASSIUM BLD-SCNC: 4.2 MMOL/L (ref 3.5–5.1)
PROT SERPL-MCNC: 6.4 G/DL (ref 6.3–8.2)
RBC # BLD AUTO: 4.3 10*6/MM3 (ref 4.2–5.4)
SODIUM BLD-SCNC: 144 MMOL/L (ref 137–145)
STRESS TARGET HR: 155 BPM
WBC NRBC COR # BLD: 14 10*3/MM3 (ref 4.8–10.8)

## 2018-03-15 PROCEDURE — 25010000002 ENOXAPARIN PER 10 MG: Performed by: SURGERY

## 2018-03-15 PROCEDURE — 99222 1ST HOSP IP/OBS MODERATE 55: CPT | Performed by: INTERNAL MEDICINE

## 2018-03-15 PROCEDURE — 94799 UNLISTED PULMONARY SVC/PX: CPT

## 2018-03-15 PROCEDURE — 25010000002 PROMETHAZINE PER 50 MG: Performed by: SURGERY

## 2018-03-15 PROCEDURE — 74241: CPT

## 2018-03-15 PROCEDURE — 80053 COMPREHEN METABOLIC PANEL: CPT | Performed by: SURGERY

## 2018-03-15 PROCEDURE — 93306 TTE W/DOPPLER COMPLETE: CPT

## 2018-03-15 PROCEDURE — 25810000003 POTASSIUM CHLORIDE PER 2 MEQ: Performed by: SURGERY

## 2018-03-15 PROCEDURE — 85025 COMPLETE CBC W/AUTO DIFF WBC: CPT | Performed by: SURGERY

## 2018-03-15 PROCEDURE — 99024 POSTOP FOLLOW-UP VISIT: CPT | Performed by: SURGERY

## 2018-03-15 PROCEDURE — 25010000002 METOCLOPRAMIDE PER 10 MG: Performed by: SURGERY

## 2018-03-15 PROCEDURE — 93005 ELECTROCARDIOGRAM TRACING: CPT | Performed by: INTERNAL MEDICINE

## 2018-03-15 PROCEDURE — 25010000002 MORPHINE PER 10 MG: Performed by: INTERNAL MEDICINE

## 2018-03-15 PROCEDURE — 25010000002 PYRIDOXINE PER 100 MG: Performed by: SURGERY

## 2018-03-15 PROCEDURE — 25010000002 THIAMINE PER 100 MG: Performed by: SURGERY

## 2018-03-15 PROCEDURE — 25010000002 ONDANSETRON PER 1 MG: Performed by: SURGERY

## 2018-03-15 PROCEDURE — 83540 ASSAY OF IRON: CPT | Performed by: SURGERY

## 2018-03-15 RX ORDER — ONDANSETRON 4 MG/1
4 TABLET, ORALLY DISINTEGRATING ORAL EVERY 8 HOURS PRN
Qty: 20 TABLET | Refills: 0 | Status: SHIPPED | OUTPATIENT
Start: 2018-03-15 | End: 2018-09-12

## 2018-03-15 RX ORDER — MORPHINE SULFATE 2 MG/ML
2 INJECTION, SOLUTION INTRAMUSCULAR; INTRAVENOUS
Status: DISCONTINUED | OUTPATIENT
Start: 2018-03-15 | End: 2018-03-15 | Stop reason: HOSPADM

## 2018-03-15 RX ORDER — OMEPRAZOLE 40 MG/1
40 CAPSULE, DELAYED RELEASE ORAL DAILY
Qty: 60 CAPSULE | Refills: 0 | Status: SHIPPED | OUTPATIENT
Start: 2018-03-15 | End: 2018-06-12 | Stop reason: SDUPTHER

## 2018-03-15 RX ORDER — OXYCODONE AND ACETAMINOPHEN 7.5; 325 MG/1; MG/1
1 TABLET ORAL EVERY 6 HOURS PRN
Qty: 30 TABLET | Refills: 0 | Status: SHIPPED | OUTPATIENT
Start: 2018-03-15 | End: 2018-04-16

## 2018-03-15 RX ADMIN — ONDANSETRON 4 MG: 2 INJECTION INTRAMUSCULAR; INTRAVENOUS at 03:26

## 2018-03-15 RX ADMIN — MORPHINE SULFATE 2 MG: 2 INJECTION, SOLUTION INTRAMUSCULAR; INTRAVENOUS at 07:01

## 2018-03-15 RX ADMIN — ALBUTEROL SULFATE 2.5 MG: 2.5 SOLUTION RESPIRATORY (INHALATION) at 03:35

## 2018-03-15 RX ADMIN — METOCLOPRAMIDE 10 MG: 5 INJECTION, SOLUTION INTRAMUSCULAR; INTRAVENOUS at 00:15

## 2018-03-15 RX ADMIN — FOLIC ACID 250 ML/HR: 5 INJECTION, SOLUTION INTRAMUSCULAR; INTRAVENOUS; SUBCUTANEOUS at 08:59

## 2018-03-15 RX ADMIN — MORPHINE SULFATE 2 MG: 2 INJECTION, SOLUTION INTRAMUSCULAR; INTRAVENOUS at 00:42

## 2018-03-15 RX ADMIN — ONDANSETRON 4 MG: 2 INJECTION INTRAMUSCULAR; INTRAVENOUS at 09:31

## 2018-03-15 RX ADMIN — ENOXAPARIN SODIUM 40 MG: 40 INJECTION SUBCUTANEOUS at 11:57

## 2018-03-15 RX ADMIN — METOCLOPRAMIDE 10 MG: 5 INJECTION, SOLUTION INTRAMUSCULAR; INTRAVENOUS at 18:16

## 2018-03-15 RX ADMIN — MORPHINE SULFATE 2 MG: 2 INJECTION, SOLUTION INTRAMUSCULAR; INTRAVENOUS at 03:25

## 2018-03-15 RX ADMIN — POTASSIUM CHLORIDE AND SODIUM CHLORIDE 125 ML/HR: 450; 150 INJECTION, SOLUTION INTRAVENOUS at 08:54

## 2018-03-15 RX ADMIN — METOCLOPRAMIDE 10 MG: 5 INJECTION, SOLUTION INTRAMUSCULAR; INTRAVENOUS at 07:01

## 2018-03-15 RX ADMIN — PANTOPRAZOLE SODIUM 40 MG: 40 INJECTION, POWDER, FOR SOLUTION INTRAVENOUS at 07:01

## 2018-03-15 RX ADMIN — ALBUTEROL SULFATE 2.5 MG: 2.5 SOLUTION RESPIRATORY (INHALATION) at 07:21

## 2018-03-15 RX ADMIN — METOCLOPRAMIDE 10 MG: 5 INJECTION, SOLUTION INTRAMUSCULAR; INTRAVENOUS at 11:56

## 2018-03-15 RX ADMIN — SODIUM CHLORIDE, POTASSIUM CHLORIDE, SODIUM LACTATE AND CALCIUM CHLORIDE 150 ML/HR: 600; 310; 30; 20 INJECTION, SOLUTION INTRAVENOUS at 04:46

## 2018-03-15 RX ADMIN — PROMETHAZINE HYDROCHLORIDE 12.5 MG: 25 INJECTION INTRAMUSCULAR; INTRAVENOUS at 11:48

## 2018-03-15 RX ADMIN — MORPHINE SULFATE 2 MG: 2 INJECTION, SOLUTION INTRAMUSCULAR; INTRAVENOUS at 12:19

## 2018-03-15 RX ADMIN — HYDROCODONE BITARTRATE AND ACETAMINOPHEN 1 TABLET: 7.5; 325 TABLET ORAL at 09:26

## 2018-03-15 NOTE — PROGRESS NOTES
"Bariatric Surgery Progress Note:      Chief Complaint:  POD #1    Subjective     Interval History:  Doing well today.  No complaints.  Pain controlled.  Denies N/V.  No fevers.  Ambulating.  Voiding.  IS 1500 reported.    Had some asymptomatic bradycardia overnight with heart monitor awakening her.  Hospitalist consult was obtained and Echo/EKG done today.  Echo was normal.  She is feeling well today and denies any lightheadedness, syncope.  No issues while ambulating.  Would like to go home.  I removed her scopolamine patch at the bedside.      Objective     Vital Signs  Blood pressure 144/91, pulse 54, temperature 97.5 °F (36.4 °C), temperature source Oral, resp. rate 16, height 170.2 cm (67\"), weight 113 kg (249 lb 12.8 oz), last menstrual period 03/06/2018, SpO2 99 %, not currently breastfeeding.      Intake/Output Summary (Last 24 hours) at 03/15/18 1752  Last data filed at 03/15/18 1406   Gross per 24 hour   Intake             2480 ml   Output                0 ml   Net             2480 ml       Physical Exam:  General: Alert, NAD  Lungs: Clear  Heart: regular rhythm, HR 45-75 while I was at the bedside  Neuro: mentation normal, awake and alert, apparently asymptomatic from bradycardia  Abdomen: soft, appropriate, incisions clean and dry  Extremities: warm, (+) SCDs       Labs:  Lab Results (last 24 hours)     Procedure Component Value Units Date/Time    Comprehensive Metabolic Panel [[id number]]  (Abnormal) Collected:  03/15/18 0612    Specimen:  Blood Updated:  03/15/18 0655     Glucose 120 (H) mg/dL      BUN 11 mg/dL      Creatinine 0.60 mg/dL      Sodium 144 mmol/L      Potassium 4.2 mmol/L      Chloride 104 mmol/L      CO2 25.0 (L) mmol/L      Calcium 8.7 mg/dL      Total Protein 6.4 g/dL      Albumin 3.80 g/dL      ALT (SGPT) 105 (H) U/L      AST (SGOT) 41 U/L      Alkaline Phosphatase 73 U/L      Total Bilirubin 0.3 mg/dL      eGFR Non African Amer 112 mL/min/1.73      Globulin 2.6 gm/dL      A/G Ratio " 1.5 g/dL      BUN/Creatinine Ratio 18.3     Anion Gap 19.2 mmol/L     Narrative:       Abnormal estimated GFR should be followed by more specific studies to confirm end stage chronic renal disease. The equation used for calculation may not be accurate for patients less than 19 years old, greater than 70 years old, patients at extremes of weight, malnutrition, or with acute renal dysfunction.    Iron [[id number]]  (Normal) Collected:  03/15/18 0612    Specimen:  Blood Updated:  03/15/18 0653     Iron 43 mcg/dL     CBC & Differential [[id number]] Collected:  03/15/18 0612    Specimen:  Blood Updated:  03/15/18 0637    Narrative:       The following orders were created for panel order CBC & Differential.  Procedure                               Abnormality         Status                     ---------                               -----------         ------                     CBC Auto Differential[[id number]]        Abnormal            Final result                 Please view results for these tests on the individual orders.    CBC Auto Differential [[id number]]  (Abnormal) Collected:  03/15/18 0612    Specimen:  Blood Updated:  03/15/18 0637     WBC 14.00 (H) 10*3/mm3      RBC 4.30 10*6/mm3      Hemoglobin 13.5 g/dL      Hematocrit 40.4 %      MCV 94.0 fL      MCH 31.4 (H) pg      MCHC 33.4 g/dL      RDW 12.2 %      RDW-SD 42.5 fl      MPV 9.3 fL      Platelets 321 10*3/mm3      Neutrophil % 82.3 (H) %      Lymphocyte % 10.4 %      Monocyte % 6.8 %      Eosinophil % 0.0 %      Basophil % 0.1 %      Immature Grans % 0.4 %      Neutrophils, Absolute 11.53 (H) 10*3/mm3      Lymphocytes, Absolute 1.45 10*3/mm3      Monocytes, Absolute 0.95 (H) 10*3/mm3      Eosinophils, Absolute 0.00 10*3/mm3      Basophils, Absolute 0.02 10*3/mm3      Immature Grans, Absolute 0.05 10*3/mm3      nRBC 0.0 /100 WBC             Assessment/Plan     POD # 1 s/p LSG.    Doing well.  UGI normal post-sleeve, images and report reviewed.  Leukocytosis  expected post-op, Hgb stable, AST elevated to 100.  Asymptomatic bradycardia overnight, still no symptoms today.  I suspect scopolamine patch and/or morphine played a role in the bradycardia.  I removed scopolamine patch at the bedside.  I spoke with Dr. Shell of hospitalist service (appreciate his consultation) and he is comfortable with discharge.  He requests she follow up with the PCP and also receive outpatient workup for sleep apnea.      Patient feels well and has met discharge criteria.  Will discharge home with follow up in 1 week with PA.  Rx given for Percocet 7.5, Zofran, omeprazole.   Discharge instructions reviewed with patient and all questions answered.        03/15/18  5:52 PM  Isabell Montana MD

## 2018-03-15 NOTE — PROGRESS NOTES
[facility]    Nerve Cath Post Op Call    Patient Name: Zofia Ortiz  :  1980  MRN:  [id number]  Date of Discharge:     Treatment Plan      No anesthesia related problems

## 2018-03-15 NOTE — PLAN OF CARE
Problem: Bariatric Surgery (Adult,Pediatric)  Goal: Signs and Symptoms of Listed Potential Problems Will be Absent, Minimized or Managed (Bariatric Surgery)  Outcome: Ongoing (interventions implemented as appropriate)   03/15/18 0514   Goal/Outcome Evaluation   Problems Assessed (Bariatric Surgery) all   Problems Present (Bariatric Surgery) none     Goal: Anesthesia/Sedation Recovery  Outcome: Ongoing (interventions implemented as appropriate)   03/15/18 0514   Goal/Outcome Evaluation   Anesthesia/Sedation Recovery progressing toward baseline

## 2018-03-15 NOTE — PROGRESS NOTES
Discharge Planning Assessment  [facility]     Patient Name: Zofia Ortiz  MRN: [id number]  Today's Date: 3/15/2018    Admit Date: 3/14/2018          Discharge Needs Assessment     Row Name 03/15/18 2774       Living Environment    Lives With child(roxie), dependent;spouse    Name(s) of Who Lives With Patient Bruce (spouse) and two dependent children (14 & 12 y/o)    Current Living Arrangements home/apartment/condo    Primary Care Provided by self    Provides Primary Care For child(roxie)    Family Caregiver if Needed none    Quality of Family Relationships supportive    Able to Return to Prior Arrangements yes       Resource/Environmental Concerns    Resource/Environmental Concerns none       Transition Planning    Patient/Family Anticipates Transition to home    Transportation Anticipated family or friend will provide       Discharge Needs Assessment    Readmission Within the Last 30 Days no previous admission in last 30 days    Concerns to be Addressed no discharge needs identified;denies needs/concerns at this time    Equipment Currently Used at Home none    Anticipated Changes Related to Illness none    Equipment Needed After Discharge none            Discharge Plan     Row Name 03/15/18 0606       Plan    Plan Home    Patient/Family in Agreement with Plan yes    Plan Comments Spoke with pt and  in room regarding DCP.  Pt is currently having pain, so  provides information.  Per , pt lives with him and their 2 dep children in a two story house.  Pt is independent and plans to return home.   denies HH and DME.  Pt has her own transportation, but family will provide transportation home.  Address, phone & PCP info verified.  CM will continue to follow and assist with discharge as needed.        Destination     No service coordination in this encounter.      Durable Medical Equipment     No service coordination in this encounter.      Dialysis/Infusion     No service coordination in this  encounter.      Home Medical Care     No service coordination in this encounter.      Social Care     No service coordination in this encounter.        Expected Discharge Date and Time     Expected Discharge Date Expected Discharge Time    Mar 16, 2018               Demographic Summary     Row Name 03/15/18 1149       General Information    Admission Type inpatient    Arrived From operating room    Referral Source admission list    Reason for Consult discharge planning    Preferred Language English     Used During This Interaction no            Functional Status     Row Name 03/15/18 1150       Functional Status    Usual Activity Tolerance good       Functional Status, IADL    Medications independent    Meal Preparation independent    Housekeeping independent    Laundry independent    Shopping independent       Mental Status    General Appearance WDL WDL       Mental Status Summary    Recent Changes in Mental Status/Cognitive Functioning no changes            Psychosocial    No documentation.           Abuse/Neglect    No documentation.           Legal    No documentation.           Substance Abuse    No documentation.           Patient Forms    No documentation.         Saskia Solorzano

## 2018-03-15 NOTE — DISCHARGE INSTR - OTHER ORDERS
If you have any questions about your recovery, please call the [facility] Nurse Call Center at [phone]. A registered nurse is available 24 hours a day 7 days a week to assist you.

## 2018-03-15 NOTE — CONSULTS
[facility]      Name:  Zofia Ortiz   Age:  38 y.o.  Sex:  female  :  1980  MRN:  [id number]   Visit Number:  [id number]  Admission Date:  3/14/2018  Date Of Service:  03/15/18  Primary Care Physician:  Morgan Arguello MD    Consulting Physician:    Dr Blackmon    Reason For Consult:    Bradycardia    History Obtained From:    patient    Chief Complaint:     Bradycardia while sleeping    History Of Presenting Illness:      Patient is a 38-year-old  female with morbid obesity who had a laparoscopic sleeve gastrectomy yesterday.  She's been doing well postop however her heart rate down into the 40s and her cardiac monitor alarm goes off waking her up.  On exam she is easily arousable, and her heart rate comes up into the 60s.  She denies any chest pressure, nausea or vomiting.  Her pain is well under control.  Her oxygen saturations do drop into the low 90s.  She does have some sleep problems in which at home she wakes up diaphoretic, and sometimes has daytime somnolence.  She also has a history of phentermine use until just prior to this surgery.  She has a history of hypertension she is on no medications for this.  She has never been tested for sleep apnea or had an echocardiogram.  She was a smoker but she quit in .  She drinks alcohol 2-3 times per week, but does not consider it a problem.    Review Of Systems:     General ROS: negative  Psychological ROS: negative  Ophthalmic ROS: negative  ENT ROS: negative  Allergy and Immunology ROS: negative  Hematological and Lymphatic ROS: negative  Endocrine ROS: negative  Breast ROS: negative  Respiratory ROS: negative  Cardiovascular ROS: negative  Gastrointestinal ROS: negative  Genito-Urinary ROS: negative  Musculoskeletal ROS: negative  Neurological ROS: negative  Dermatological ROS: negative       Past Medical History:    Hypertension, morbid obesity    Past Surgical history:    , bilateral  tubal ligation, right knee surgery, gastric sleeve    Social History:    Quit smoking in 2010, still uses vape, drinks alcohol 2-3 times a week, does not use drugs.    Family History:    Mother has hypertension    Allergies:      Review of patient's allergies indicates no known allergies.    Home Medications:    Prior to Admission Medications     Prescriptions Last Dose Informant Patient Reported? Taking?    calcium carbonate (TUMS) 500 MG chewable tablet Past Week  Yes Yes    Chew 1 tablet Daily.    diphenhydrAMINE (BENADRYL) 25 mg capsule More than a month  Yes No    Take 25 mg by mouth Every 6 (Six) Hours As Needed for Sleep.    Multiple Vitamins-Minerals (DAILY MULTIVITAMIN PO) 3/11/18  Yes No    Take  by mouth.             Hospital Scheduled Meds:      albuterol 2.5 mg Nebulization Q6H While Awake - RT   enoxaparin 40 mg Subcutaneous Q24H   metoclopramide 10 mg Intravenous Q6H   IV Fluids 1000 mL + additives 250 mL/hr Intravenous Once   pantoprazole 40 mg Intravenous Q AM         lactated ringers 150 mL/hr Last Rate: 150 mL/hr (03/14/18 2219)   sodium chloride 0.45 % with KCl 20 mEq 125 mL/hr         Vital Signs:    Temp:  [97.6 °F (36.4 °C)-97.7 °F (36.5 °C)] 97.6 °F (36.4 °C)  Heart Rate:  [50-97] 50  Resp:  [16-22] 16  BP: (121-150)/(70-90) 128/84    1    03/14/18  1500   Weight: 111 kg (245 lb)       Body mass index is 38.37 kg/m².    Physical Exam:      General Appearance:    Alert, cooperative, in no acute distress   Head:    Normocephalic, without obvious abnormality, atraumatic   Eyes:            Lids and lashes normal, conjunctivae and sclerae normal, no   icterus, no pallor, corneas clear, PERRLA   Ears:    Ears appear intact with no abnormalities noted   Throat:   No oral lesions, no thrush, oral mucosa moist   Neck:   No adenopathy, supple, trachea midline, no thyromegaly, no     carotid bruit, no JVD   Back:     No kyphosis present, no scoliosis present, no skin lesions,       erythema or scars, no  tenderness to percussion or                   palpation,   range of motion normal   Lungs:     Clear to auscultation,respirations regular, even and                   unlabored    Heart:    Regular rhythm and normal rate, normal S1 and S2, no            murmur, no gallop, no rub, no click   Breast Exam:    Deferred   Abdomen:     Normal bowel sounds, no masses, no organomegaly, soft        non-tender, non-distended, no guarding, no rebound                 tenderness   Genitalia:    Deferred   Extremities:   Moves all extremities well, no edema, no cyanosis, no              redness   Pulses:   Pulses palpable and equal bilaterally   Skin:   No bleeding, bruising or rash   Lymph nodes:   No palpable adenopathy   Neurologic:   Cranial nerves 2 - 12 grossly intact, sensation intact, DTR        present and equal bilaterally           Telemetry:      Sinus bradycardia noted    I have personally looked at  the telemetry strips.    Labs:                  Invalid input(s): PROTEstimated Creatinine Clearance: 122.5 mL/min (by C-G formula based on SCr of 0.8 mg/dL).  No results found for: AMMONIA          No results found for: HGBA1C  Lab Results   Component Value Date    TSH 4.206 08/03/2017     Lab Results   Component Value Date    PREGTESTUR Negative 03/12/2018     Pain Management Panel     There is no flowsheet data to display.                          Radiology:    Imaging Results (last 7 days)     ** No results found for the last 168 hours. **          Assessment:    1.  Sinus bradycardia, asymptomatic  2.  Mild hypoxia  3.  Morbid obesity status post laparoscopic sleeve gastrectomy   4.  History of phentermine use    Recommendations/Plan:     We'll place on 2 L nasal cannula oxygen.  Check EKG.  Recommend echocardiogram in the a.m. given her phentermine use.  If she is staying another night would recommend overnight pulse oximetry, however if she is leaving she could have referral to a sleep specialist such as Dr. Rizo.   DC Catapres and labetalol when necessary.  We'll place on hydralazine when necessary.  DC sedatives such as Ativan.  Reduce morphine to 2 mg every 2 hours when necessary.  Patient has not received Dilaudid, so we will discontinue this.  Check lab work in the a.m.  Hospitalist team will follow.  Further recommendations will depend on the clinical course.    Carlos Shell DO  03/15/18  12:26 AM

## 2018-03-15 NOTE — PLAN OF CARE
Problem: Patient Care Overview  Goal: Plan of Care Review  Outcome: Ongoing (interventions implemented as appropriate)    Goal: Individualization and Mutuality  Outcome: Ongoing (interventions implemented as appropriate)    Goal: Discharge Needs Assessment  Outcome: Ongoing (interventions implemented as appropriate)    Goal: Interprofessional Rounds/Family Conf  Outcome: Ongoing (interventions implemented as appropriate)      Problem: Bariatric Surgery (Adult,Pediatric)  Goal: Signs and Symptoms of Listed Potential Problems Will be Absent, Minimized or Managed (Bariatric Surgery)  Outcome: Ongoing (interventions implemented as appropriate)    Goal: Anesthesia/Sedation Recovery  Outcome: Ongoing (interventions implemented as appropriate)

## 2018-03-16 NOTE — PROGRESS NOTES
Case Management Discharge Note         Destination     No service coordination in this encounter.      Durable Medical Equipment     No service coordination in this encounter.      Dialysis/Infusion     No service coordination in this encounter.      Home Medical Care     No service coordination in this encounter.      Social Care     No service coordination in this encounter.        Other: Other (Private vehicle)    Final Discharge Disposition Code: 01 - home or self-care

## 2018-03-19 ENCOUNTER — OFFICE VISIT (OUTPATIENT)
Dept: BARIATRICS/WEIGHT MGMT | Facility: CLINIC | Age: 38
End: 2018-03-19

## 2018-03-19 VITALS
OXYGEN SATURATION: 99 % | BODY MASS INDEX: 37.83 KG/M2 | SYSTOLIC BLOOD PRESSURE: 149 MMHG | WEIGHT: 241.01 LBS | RESPIRATION RATE: 18 BRPM | HEART RATE: 88 BPM | HEIGHT: 67 IN | TEMPERATURE: 97.5 F | DIASTOLIC BLOOD PRESSURE: 89 MMHG

## 2018-03-19 DIAGNOSIS — R19.7 DIARRHEA, UNSPECIFIED TYPE: Primary | ICD-10-CM

## 2018-03-19 DIAGNOSIS — Z98.84 STATUS POST BARIATRIC SURGERY: ICD-10-CM

## 2018-03-19 DIAGNOSIS — E66.9 OBESITY, CLASS II, BMI 35-39.9: ICD-10-CM

## 2018-03-19 PROCEDURE — 99024 POSTOP FOLLOW-UP VISIT: CPT | Performed by: PHYSICIAN ASSISTANT

## 2018-03-19 RX ORDER — OXYCODONE AND ACETAMINOPHEN 7.5; 325 MG/1; MG/1
1 TABLET ORAL EVERY 6 HOURS PRN
Qty: 30 TABLET | Refills: 0 | Status: SHIPPED | OUTPATIENT
Start: 2018-03-19 | End: 2018-04-16

## 2018-03-19 NOTE — PROGRESS NOTES
[facility] Bariatric Surgery  [address]  [facility] [id number] [phone]      Patient Name:  Zofia Ortiz.  :  1980      Date of Visit: 3/19/2018      Reason for Visit:  POD #5    HPI:  Zofia Ortiz is a 38 y.o. female s/p LSG by  on 3/14/18    Discharged POD#1. Patient did have asymptomatic bradycardia during stay with hospitalist consult and recs to f/u with PCP re: sleep apnea. Scopolamine + morphine as possible etiology.  D/c with percoset, zofran, omeprazole.     Patient presents 2 days before scheduled postop appt. C/o rash of abdomen, itching.  Diarrhea, 4 episodes of watery brown/ foul smell, did have incontinence in the bed twice. Since taking imodium 3/16, only 1 episode of diarrhea since.  Had nausea/ vomiting 3 episodes 3/16, nauseas controlled on zofran otherwise.  Abdominal pain described as soreness all over- taking percoset PRN, did call Dr. Blackmon and was told she could increase dose, now  only has 2 pain pills left and concerned.   She does note some dysphagia with swallowing, takes awhile for drinks to go down, although states she is having no problems getting in her protein and fluids.   No reflux. Denies pulmonary issues and fevers.  Tolerating diet progression - on stage 1.  Getting 100g prot/day, protstat and shakes.  Drinking 32 fluid oz/ water day. Has not started vitamins. On Omeprazole .  Holding ASA , NSAIDs , Tramadol, Hormones, Diuretics , Steroids and Immunologics.  Ambulating.     Presurgery weight: 253 pounds.  Today's weight is 109 kg (241 lb 0.2 oz) pounds, today's  Body mass index is 37.75 kg/m²., and her weight loss since surgery is 12 pounds.       Past Medical History:   Diagnosis Date   • Abnormal glucose    • Body piercing     EARS ONLY   • Dyspepsia     rare heartburn sx's, serum h. pyl neg   • Dyspnea on exertion    • Elevated BP     156/106 @ Intake, denies hx HTN.  159/88 today.  Reviewed her BP in outlying  preop diet visits, all normal range   • Elevated transaminase level    • Fatigue    • Former smoker     Vapes now   • Morbid obesity    •  (normal spontaneous vaginal delivery)     x 1 w/o complic   • Tattoos     NECK, LEFT THIGH, RIGHT ANKLE   • Vitamin D deficiency    • Wears partial dentures     UPPER ONLY     Past Surgical History:   Procedure Laterality Date   •  SECTION WITH TUBAL  2007   • CYSTECTOMY      ORAL   • ENDOSCOPY N/A 3/14/2018    Procedure: ESOPHAGOGASTRODUODENOSCOPY;  Surgeon: Glen Blackmon MD;  Location: [facility] OR;  Service: Bariatric   • GASTRIC SLEEVE LAPAROSCOPIC N/A 3/14/2018    Procedure: GASTRIC SLEEVE LAPAROSCOPIC;  Surgeon: Glen Blackmon MD;  Location: [facility] OR;  Service: Bariatric   • KNEE ARTHROSCOPY Right    • WISDOM TOOTH EXTRACTION      ALL FOUR     Outpatient Prescriptions Marked as Taking for the 3/19/18 encounter (Office Visit) with Janine Timmons PA-C   Medication Sig Dispense Refill   • omeprazole (PRILOSEC) 40 MG capsule Take 1 capsule by mouth Daily. 60 capsule 0   • ondansetron ODT (ZOFRAN ODT) 4 MG disintegrating tablet Take 1 tablet by mouth Every 8 (Eight) Hours As Needed for Nausea or Vomiting. 20 tablet 0   • oxyCODONE-acetaminophen (PERCOCET) 7.5-325 MG per tablet Take 1 tablet by mouth Every 6 (Six) Hours As Needed for Moderate Pain . 30 tablet 0     No Known Allergies    Social History     Social History   • Marital status:      Spouse name: Bruce Ortiz   • Number of children: 2   • Years of education: Bachelor's     Occupational History   • Homemaker      Social History Main Topics   • Smoking status: Former Smoker     Packs/day: 0.50     Years: 8.00     Types: Cigarettes     Quit date:    • Smokeless tobacco: Never Used      Comment: uses vape w/ small amount of nicotine daily-smokeless   • Alcohol use Yes      Comment: 2-3 drinks weekly 2-3 drinks each time    • Drug use: No   • Sexual activity: Defer      Comment:    "    Other Topics Concern   • Not on file     Social History Narrative    Lives in [place], [place] w/  and 2 children.  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       /89 (BP Location: Left arm, Patient Position: Sitting, Cuff Size: Large Adult)   Pulse 88   Temp 97.5 °F (36.4 °C) (Temporal Artery )   Resp 18   Ht 170.2 cm (67\")   Wt 109 kg (241 lb 0.2 oz)   LMP 03/06/2018 (Approximate)   SpO2 99%   BMI 37.75 kg/m²   Physical Exam   Constitutional: She is oriented to person, place, and time. She appears well-developed and well-nourished.   HENT:   Head: Normocephalic and atraumatic.   Cardiovascular: Normal rate and regular rhythm.    Pulmonary/Chest: Effort normal and breath sounds normal.   Abdominal: Soft. Bowel sounds are normal.   Incisions healing well without drainage, fluctuance, induration   Neurological: She is alert and oriented to person, place, and time.   Skin: Skin is warm and dry. Rash (diffuse erythematous macular rash of abdomen) noted.   Psychiatric: She has a normal mood and affect. Her behavior is normal.         Assessment:   POD #5 s/p LSG by  on 3/14/18    ICD-10-CM ICD-9-CM   1. Diarrhea, unspecified type R19.7 787.91   2. Status post bariatric surgery Z98.84 V45.86   3. Obesity, Class II, BMI 35-39.9 E66.9 278.00         Plan:  Rash: advised antihistamine topical or PO PRN, hydrocortisone topical in small amounts ok PRN.  Diarrhea:  c dif order given today to be collected if diarrhea continues.  Abdominal pain: Declined trigger point injection.  Percoset rx given from Dr. Montana, to be scheduled 1tab q6 hours today, 1 tab q8h tomorrow, 1 tab q12 hours following, transition to tylenol as able.  Nausea: controlled on zofran. Continue to advance diet per manual.  Increase protein intake to 100g/day.  Increase exercise/activity as tolerated.  Reviewed lifting restrictions, nothing >25 lbs x 2 more weeks.  Start full vitamin regimen.  Continue PPI.  Continue to avoid " ASA/NSAIDs/Steroids/tobacco x 6 weeks postop.  Call w/ problems/concerns.    The patient was instructed to follow up in 3 weeks, sooner if needed.

## 2018-03-20 LAB
LAB AP CASE REPORT: NORMAL
Lab: NORMAL
PATH REPORT.FINAL DX SPEC: NORMAL

## 2018-03-21 RX ORDER — URSODIOL 300 MG/1
300 CAPSULE ORAL 2 TIMES DAILY
Qty: 60 CAPSULE | Refills: 1 | Status: SHIPPED | OUTPATIENT
Start: 2018-03-21 | End: 2018-05-16 | Stop reason: SDUPTHER

## 2018-03-26 ENCOUNTER — RESULTS ENCOUNTER (OUTPATIENT)
Dept: BARIATRICS/WEIGHT MGMT | Facility: CLINIC | Age: 38
End: 2018-03-26

## 2018-03-26 DIAGNOSIS — R19.7 DIARRHEA, UNSPECIFIED TYPE: ICD-10-CM

## 2018-03-26 PROCEDURE — 93005 ELECTROCARDIOGRAM TRACING: CPT | Performed by: PHYSICIAN ASSISTANT

## 2018-04-16 ENCOUNTER — OFFICE VISIT (OUTPATIENT)
Dept: BARIATRICS/WEIGHT MGMT | Facility: CLINIC | Age: 38
End: 2018-04-16

## 2018-04-16 VITALS
SYSTOLIC BLOOD PRESSURE: 131 MMHG | WEIGHT: 222.51 LBS | DIASTOLIC BLOOD PRESSURE: 83 MMHG | RESPIRATION RATE: 18 BRPM | OXYGEN SATURATION: 99 % | HEIGHT: 67 IN | BODY MASS INDEX: 34.92 KG/M2 | HEART RATE: 97 BPM | TEMPERATURE: 96.7 F

## 2018-04-16 DIAGNOSIS — Z98.84 S/P BARIATRIC SURGERY: ICD-10-CM

## 2018-04-16 DIAGNOSIS — E66.9 OBESITY, CLASS I, BMI 30-34.9: ICD-10-CM

## 2018-04-16 DIAGNOSIS — R10.13 DYSPEPSIA: ICD-10-CM

## 2018-04-16 DIAGNOSIS — R53.83 FATIGUE, UNSPECIFIED TYPE: Primary | ICD-10-CM

## 2018-04-16 DIAGNOSIS — E55.9 VITAMIN D DEFICIENCY: ICD-10-CM

## 2018-04-16 PROCEDURE — 99024 POSTOP FOLLOW-UP VISIT: CPT | Performed by: PHYSICIAN ASSISTANT

## 2018-04-16 NOTE — PROGRESS NOTES
[facility] Bariatric Surgery  [address]  [facility] [address] [phone]      Patient Name:  Zofia Ortiz.  :  1980      Date of Visit: 2018      Reason for Visit:  1 month postop    HPI:  Zofia Ortiz is a 38 y.o. female s/p LSG by  on 3/14/18    LOV POD #5 c/o diffuse abdominal rash, as well as dysphagia/N/V/D - all much improved.  Tolerating stage 5 diet.  Getting at least 100g prot/day.  Has some constipation now and is using Miralax daily.  Admits she probably needs to increase her daily fluid intake.  Continues on Omeprazole daily and Actigall BID. Taking vitamins as directed.  Walking for exercise.     Presurgery weight: 253 pounds.  Today's weight is 101 kg (222 lb 8.2 oz) pounds, today's  Body mass index is 34.85 kg/m²., and her weight loss since surgery is 31 pounds.       Past Medical History:   Diagnosis Date   • Abnormal glucose    • Body piercing     EARS ONLY   • Dyspepsia     rare heartburn sx's, serum h. pyl neg   • Dyspnea on exertion    • Elevated BP     156/106 @ Intake, denies hx HTN.     • Elevated transaminase level    • Fatigue    • Former smoker     Vapes now   • Morbid obesity    •  (normal spontaneous vaginal delivery)     x 1 w/o complic   • Tattoos     NECK, LEFT THIGH, RIGHT ANKLE   • Vitamin D deficiency    • Wears partial dentures     UPPER ONLY     Past Surgical History:   Procedure Laterality Date   •  SECTION WITH TUBAL  2007   • CYSTECTOMY      ORAL   • ENDOSCOPY N/A 3/14/2018    Procedure: ESOPHAGOGASTRODUODENOSCOPY;  Surgeon: Glen Blackmon MD;  Location: [facility] OR;  Service: Bariatric   • GASTRIC SLEEVE LAPAROSCOPIC N/A 3/14/2018    Procedure: GASTRIC SLEEVE LAPAROSCOPIC;  Surgeon: Glen Blackmon MD;  Location: [facility] OR;  Service: Bariatric   • KNEE ARTHROSCOPY Right    • WISDOM TOOTH EXTRACTION      ALL FOUR     Outpatient Prescriptions Marked as Taking for the 18 encounter  "(Office Visit) with GUERITA Anne   Medication Sig Dispense Refill   • calcium carbonate (TUMS) 500 MG chewable tablet Chew 1 tablet Daily.     • Multiple Vitamins-Minerals (DAILY MULTIVITAMIN PO) Take  by mouth Daily.     • omeprazole (PRILOSEC) 40 MG capsule Take 1 capsule by mouth Daily. 60 capsule 0   • ursodiol (ACTIGALL) 300 MG capsule Take 1 capsule by mouth 2 (Two) Times a Day. 60 capsule 1     No Known Allergies    Social History     Social History   • Marital status:      Spouse name: Bruce Ortiz   • Number of children: 2   • Years of education: Bachelor's     Occupational History   • Homemaker      Social History Main Topics   • Smoking status: Former Smoker     Packs/day: 0.50     Years: 8.00     Types: Cigarettes     Quit date: 2013   • Smokeless tobacco: Never Used      Comment: uses vape w/ small amount of nicotine daily-smokeless   • Alcohol use Yes      Comment: 2-3 drinks weekly 2-3 drinks each time    • Drug use: No   • Sexual activity: Defer      Comment:       Other Topics Concern   • Not on file     Social History Narrative    Lives in [place], [place] w/  and 2 children.  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       /83 (BP Location: Left arm, Patient Position: Sitting, Cuff Size: Large Adult)   Pulse 97   Temp 96.7 °F (35.9 °C) (Temporal Artery )   Resp 18   Ht 170.2 cm (67\")   Wt 101 kg (222 lb 8.2 oz)   SpO2 99%   BMI 34.85 kg/m²   Physical Exam   Constitutional: She appears well-developed and well-nourished.   Cardiovascular: Normal rate and regular rhythm.    Pulmonary/Chest: Effort normal and breath sounds normal.   Abdominal: Soft. Bowel sounds are normal.   Incisions well healed   Neurological: She is alert.   Skin: Skin is warm and dry. No rash noted.   Psychiatric: She has a normal mood and affect. Her behavior is normal.         Assessment:   1 month s/p LSG by  on 3/14/18    ICD-10-CM ICD-9-CM   1. Fatigue, unspecified type R53.83 780.79   2. " Dyspepsia R10.13 536.8   3. Vitamin D deficiency E55.9 268.9   4. Obesity, Class I, BMI 30-34.9 E66.9 278.00   5. S/P bariatric surgery Z98.84 V45.86         Plan:  Continue w/ gradual diet progression.  Continue high protein, low carb, good food choices.  Increase exercise/activity as tolerated.  Routine labs ordered.  Continue vitamins w/ adjustments pending lab results.  Continue to avoid ASA/NSAIDs/Steroids/tobacco x 6 weeks postop.  Call w/ problems/concerns.    The patient was instructed to follow up in 2 months, sooner if needed.

## 2018-04-24 RX ORDER — ERGOCALCIFEROL 1.25 MG/1
50000 CAPSULE ORAL WEEKLY
Qty: 12 CAPSULE | Refills: 0 | Status: SHIPPED | OUTPATIENT
Start: 2018-04-24 | End: 2019-04-17

## 2018-05-16 RX ORDER — URSODIOL 300 MG/1
CAPSULE ORAL
Qty: 60 CAPSULE | Refills: 3 | Status: SHIPPED | OUTPATIENT
Start: 2018-05-16 | End: 2018-09-12

## 2018-06-12 ENCOUNTER — OFFICE VISIT (OUTPATIENT)
Dept: BARIATRICS/WEIGHT MGMT | Facility: CLINIC | Age: 38
End: 2018-06-12

## 2018-06-12 VITALS
RESPIRATION RATE: 18 BRPM | BODY MASS INDEX: 31.94 KG/M2 | TEMPERATURE: 97.3 F | SYSTOLIC BLOOD PRESSURE: 100 MMHG | HEART RATE: 51 BPM | WEIGHT: 203.51 LBS | HEIGHT: 67 IN | OXYGEN SATURATION: 99 % | DIASTOLIC BLOOD PRESSURE: 75 MMHG

## 2018-06-12 DIAGNOSIS — Z98.84 S/P BARIATRIC SURGERY: ICD-10-CM

## 2018-06-12 DIAGNOSIS — E66.9 OBESITY, CLASS I, BMI 30-34.9: ICD-10-CM

## 2018-06-12 DIAGNOSIS — R53.83 FATIGUE, UNSPECIFIED TYPE: ICD-10-CM

## 2018-06-12 DIAGNOSIS — Z13.0 SCREENING FOR IRON DEFICIENCY ANEMIA: ICD-10-CM

## 2018-06-12 DIAGNOSIS — E55.9 VITAMIN D DEFICIENCY: Primary | ICD-10-CM

## 2018-06-12 PROBLEM — E66.813 OBESITY, CLASS III, BMI 40-49.9 (MORBID OBESITY): Status: RESOLVED | Noted: 2018-03-08 | Resolved: 2018-06-12

## 2018-06-12 PROBLEM — E66.01 OBESITY, CLASS III, BMI 40-49.9 (MORBID OBESITY): Status: RESOLVED | Noted: 2018-03-08 | Resolved: 2018-06-12

## 2018-06-12 PROCEDURE — 99024 POSTOP FOLLOW-UP VISIT: CPT | Performed by: PHYSICIAN ASSISTANT

## 2018-06-12 RX ORDER — OMEPRAZOLE 40 MG/1
40 CAPSULE, DELAYED RELEASE ORAL DAILY
Qty: 90 CAPSULE | Refills: 0 | Status: SHIPPED | OUTPATIENT
Start: 2018-06-12 | End: 2018-09-12 | Stop reason: SDUPTHER

## 2018-06-12 NOTE — PROGRESS NOTES
[facility] Bariatric Surgery  [address]  [facility] [address]  [phone]      Patient Name:  Zofia Ortiz.  :  1980      Date of Visit: 2018      Reason for Visit:  3 months postop    HPI:  Zofia Ortiz is a 38 y.o. female s/p LSG by  on 3/14/18    Tolerated diet progression.  Still getting 100g prot/day.  No issues/concerns.  Continues on Omeprazole daily - needs another RX.  Still on Actigall BID.  Labs 18 - low Vit D (26.1) but o/w okay from a bariatric standpoint.  Taking vitamins as directed, including wkly Vit D.  Walking for exercise.     Presurgery weight: 253 pounds.  Today's weight is 92.3 kg (203 lb 8.2 oz) pounds, today's  Body mass index is 31.87 kg/m²., and her weight loss since surgery is 50 pounds.       Past Medical History:   Diagnosis Date   • Abnormal glucose    • Body piercing     EARS ONLY   • Dyspepsia     rare heartburn sx's, serum h. pyl neg   • Dyspnea on exertion    • Elevated BP     156/106 @ Intake, denies hx HTN.     • Elevated transaminase level    • Fatigue    • Former smoker     Vapes now   • Morbid obesity    •  (normal spontaneous vaginal delivery)     x 1 w/o complic   • Tattoos     NECK, LEFT THIGH, RIGHT ANKLE   • Vitamin D deficiency    • Wears partial dentures     UPPER ONLY     Past Surgical History:   Procedure Laterality Date   •  SECTION WITH TUBAL  2007   • CYSTECTOMY      ORAL   • ENDOSCOPY N/A 3/14/2018    Procedure: ESOPHAGOGASTRODUODENOSCOPY;  Surgeon: Glen Blackmon MD;  Location: [facility] OR;  Service: Bariatric   • GASTRIC SLEEVE LAPAROSCOPIC N/A 3/14/2018    Procedure: GASTRIC SLEEVE LAPAROSCOPIC;  Surgeon: Glen Blackmon MD;  Location: [facility] OR;  Service: Bariatric   • KNEE ARTHROSCOPY Right    • WISDOM TOOTH EXTRACTION      ALL FOUR     Outpatient Prescriptions Marked as Taking for the 18 encounter (Office Visit) with GUERITA Anne   Medication Sig  "Dispense Refill   • calcium carbonate (TUMS) 500 MG chewable tablet Chew 1 tablet Daily.     • Cranberry 1000 MG capsule Take  by mouth.     • Multiple Vitamins-Minerals (DAILY MULTIVITAMIN PO) Take  by mouth Daily.     • omeprazole (PRILOSEC) 40 MG capsule Take 1 capsule by mouth Daily. 90 capsule 0   • ursodiol (ACTIGALL) 300 MG capsule take 1 capsule by mouth twice a day 60 capsule 3   • vitamin D (ERGOCALCIFEROL) 78254 units capsule capsule Take 1 capsule by mouth 1 (One) Time Per Week. 12 capsule 0   • [DISCONTINUED] omeprazole (PRILOSEC) 40 MG capsule Take 1 capsule by mouth Daily. 60 capsule 0     No Known Allergies    Social History     Social History   • Marital status:      Spouse name: Bruce Ortiz   • Number of children: 2   • Years of education: Bachelor's     Occupational History   • Homemaker      Social History Main Topics   • Smoking status: Former Smoker     Packs/day: 0.50     Years: 8.00     Types: Cigarettes     Quit date: 2013   • Smokeless tobacco: Never Used      Comment: uses vape w/ small amount of nicotine daily-smokeless   • Alcohol use Yes      Comment: 2-3 drinks weekly 2-3 drinks each time    • Drug use: No   • Sexual activity: Defer      Comment:       Other Topics Concern   • Not on file     Social History Narrative    Lives in [place], [place] w/  and 2 children.  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       /75 (BP Location: Left arm, Patient Position: Sitting, Cuff Size: Large Adult)   Pulse 51   Temp 97.3 °F (36.3 °C) (Temporal Artery )   Resp 18   Ht 170.2 cm (67\")   Wt 92.3 kg (203 lb 8.2 oz)   SpO2 99%   BMI 31.87 kg/m²   Physical Exam   Constitutional: She appears well-developed and well-nourished. She is cooperative.   HENT:   Mouth/Throat: Oropharynx is clear and moist and mucous membranes are normal.   Eyes: Conjunctivae are normal. No scleral icterus.   Cardiovascular: Normal rate.    Pulmonary/Chest: Effort normal.   Abdominal: Soft. There is no " tenderness.   Musculoskeletal: Normal range of motion. She exhibits no edema.   Neurological: She is alert.   Skin: Skin is warm and dry. No rash noted.   Psychiatric: She has a normal mood and affect. Judgment normal.         Assessment:   3 months s/p LSG by  on 3/14/18    ICD-10-CM ICD-9-CM   1. Vitamin D deficiency E55.9 268.9   2. Screening for iron deficiency anemia Z13.0 V78.0   3. Fatigue, unspecified type R53.83 780.79   4. Obesity, Class I, BMI 30-34.9 E66.9 278.00   5. S/P bariatric surgery Z98.84 V45.86       Plan:  Continue w/ good food choices.  Focus on healthy habits.  Routine labs ordered.  Continue vitamins w/ adjustments pending lab results.  Call w/ problems/concerns.    The patient was instructed to follow up in 3 months, sooner if needed.

## 2018-06-15 LAB
25(OH)D3+25(OH)D2 SERPL-MCNC: 84.8 NG/ML
ALBUMIN SERPL-MCNC: 4.32 G/DL (ref 3.2–4.8)
ALBUMIN/GLOB SERPL: 1.7 G/DL (ref 1.5–2.5)
ALP SERPL-CCNC: 85 U/L (ref 25–100)
ALT SERPL-CCNC: 29 U/L (ref 7–40)
AST SERPL-CCNC: 31 U/L (ref 0–33)
BASOPHILS # BLD AUTO: 0.03 10*3/MM3 (ref 0–0.2)
BASOPHILS NFR BLD AUTO: 0.4 % (ref 0–1)
BILIRUB SERPL-MCNC: 0.7 MG/DL (ref 0.3–1.2)
BUN SERPL-MCNC: 22 MG/DL (ref 9–23)
BUN/CREAT SERPL: 31.4 (ref 7–25)
CALCIUM SERPL-MCNC: 9.3 MG/DL (ref 8.7–10.4)
CHLORIDE SERPL-SCNC: 102 MMOL/L (ref 99–109)
CO2 SERPL-SCNC: 29 MMOL/L (ref 20–31)
CREAT SERPL-MCNC: 0.7 MG/DL (ref 0.6–1.3)
EOSINOPHIL # BLD AUTO: 0.46 10*3/MM3 (ref 0–0.3)
EOSINOPHIL NFR BLD AUTO: 6.6 % (ref 0–3)
ERYTHROCYTE [DISTWIDTH] IN BLOOD BY AUTOMATED COUNT: 14.5 % (ref 11.3–14.5)
FERRITIN SERPL-MCNC: 40 NG/ML (ref 10–291)
FOLATE SERPL-MCNC: >24 NG/ML (ref 3.2–20)
GFR SERPLBLD CREATININE-BSD FMLA CKD-EPI: 114 ML/MIN/1.73
GFR SERPLBLD CREATININE-BSD FMLA CKD-EPI: 94 ML/MIN/1.73
GLOBULIN SER CALC-MCNC: 2.5 GM/DL
GLUCOSE SERPL-MCNC: 87 MG/DL (ref 70–100)
HCT VFR BLD AUTO: 46.4 % (ref 34.5–44)
HGB BLD-MCNC: 14.7 G/DL (ref 11.5–15.5)
IMM GRANULOCYTES # BLD: 0.02 10*3/MM3 (ref 0–0.03)
IMM GRANULOCYTES NFR BLD: 0.3 % (ref 0–0.6)
IRON SERPL-MCNC: 114 MCG/DL (ref 50–175)
LYMPHOCYTES # BLD AUTO: 2.17 10*3/MM3 (ref 0.6–4.8)
LYMPHOCYTES NFR BLD AUTO: 31.2 % (ref 24–44)
Lab: NORMAL
MCH RBC QN AUTO: 30.1 PG (ref 27–31)
MCHC RBC AUTO-ENTMCNC: 31.7 G/DL (ref 32–36)
MCV RBC AUTO: 95.1 FL (ref 80–99)
METHYLMALONATE SERPL-SCNC: 68 NMOL/L (ref 0–378)
MONOCYTES # BLD AUTO: 0.34 10*3/MM3 (ref 0–1)
MONOCYTES NFR BLD AUTO: 4.9 % (ref 0–12)
NEUTROPHILS # BLD AUTO: 3.93 10*3/MM3 (ref 1.5–8.3)
NEUTROPHILS NFR BLD AUTO: 56.6 % (ref 41–71)
PLATELET # BLD AUTO: 321 10*3/MM3 (ref 150–450)
POTASSIUM SERPL-SCNC: 4.5 MMOL/L (ref 3.5–5.5)
PREALB SERPL-MCNC: 23 MG/DL (ref 14–35)
PROT SERPL-MCNC: 6.8 G/DL (ref 5.7–8.2)
RBC # BLD AUTO: 4.88 10*6/MM3 (ref 3.89–5.14)
SODIUM SERPL-SCNC: 142 MMOL/L (ref 132–146)
VIT B1 BLD-SCNC: 283.4 NMOL/L (ref 66.5–200)
WBC # BLD AUTO: 6.95 10*3/MM3 (ref 3.5–10.8)

## 2018-07-12 ENCOUNTER — OFFICE VISIT (OUTPATIENT)
Dept: BARIATRICS/WEIGHT MGMT | Facility: CLINIC | Age: 38
End: 2018-07-12

## 2018-07-12 VITALS
BODY MASS INDEX: 31.23 KG/M2 | DIASTOLIC BLOOD PRESSURE: 78 MMHG | HEIGHT: 67 IN | OXYGEN SATURATION: 99 % | WEIGHT: 199 LBS | SYSTOLIC BLOOD PRESSURE: 110 MMHG | TEMPERATURE: 97.8 F | HEART RATE: 68 BPM | RESPIRATION RATE: 18 BRPM

## 2018-07-12 DIAGNOSIS — Z98.84 STATUS POST BARIATRIC SURGERY: Primary | ICD-10-CM

## 2018-07-12 DIAGNOSIS — R10.9 ABDOMINAL PAIN, UNSPECIFIED ABDOMINAL LOCATION: ICD-10-CM

## 2018-07-12 PROCEDURE — 99214 OFFICE O/P EST MOD 30 MIN: CPT | Performed by: PHYSICIAN ASSISTANT

## 2018-07-12 NOTE — PROGRESS NOTES
"[facility] Bariatric Surgery  [address]  [facility] [address] [phone]        Patient Name:  Zofia Ortiz.  :  1980      Date of Visit: 2018      Reason for Visit:   4 months postop      HPI: Zofia Ortiz is a 38 y.o. female s/p LSG by  on 3/14/18    LOV 18- Doing well, no concerns.     Presents today with diffuse abdominal pain of upper abdomen/ ribs.  Went to [place] 8 days ago- went wakeboarding, felt like she was \"hit by a truck\" after with very sore muscles.  Since being home has developed a cough that has been very bothersome, though does seem to be getting better.  Cough \"tasted like infection\", no fevers/ chills, no sputum. Was coughing to the point she was dry heaving.  Abd pain diffuse though abdomen, under ribs, worse with coughing/ moving/ straining.  Denies dysphagia, reflux, nausea, vomiting and fevers.  BM normal.     Getting 100g prot/day, 3 shakes a day. Eating 5 times a day.  Drinking 55 fluid oz/day, shakes + water.  1 month labs revealed bariatric levels wnl. Taking MVI, B12, B1, Calcium, Vit D, iron and Vit C.  On Omeprazole  and Actigall .  Exercising: jogging/ walking 1.5 miles 4 times a week, swimming/ sports with kids.     Presurgery weight: 253 pounds.  Today's weight is 90.3 kg (199 lb) pounds, today's  Body mass index is 31.17 kg/m²., and her weight loss since surgery is 54 pounds.      Past Medical History:   Diagnosis Date   • Abnormal glucose    • Body piercing     EARS ONLY   • Dyspepsia     rare heartburn sx's, serum h. pyl neg   • Dyspnea on exertion    • Elevated BP     156/106 @ Intake, denies hx HTN.     • Elevated transaminase level    • Fatigue    • Former smoker     Vapes now   • Morbid obesity (CMS/HCC)    •  (normal spontaneous vaginal delivery)     x 1 w/o complic   • Tattoos     NECK, LEFT THIGH, RIGHT ANKLE   • Vitamin D deficiency    • Wears partial dentures     UPPER ONLY     Past " Surgical History:   Procedure Laterality Date   •  SECTION WITH TUBAL  2007   • CYSTECTOMY      ORAL   • ENDOSCOPY N/A 3/14/2018    Procedure: ESOPHAGOGASTRODUODENOSCOPY;  Surgeon: Glen Blackmon MD;  Location: [facility] OR;  Service: Bariatric   • GASTRIC SLEEVE LAPAROSCOPIC N/A 3/14/2018    Procedure: GASTRIC SLEEVE LAPAROSCOPIC;  Surgeon: Glen Blackmon MD;  Location: [facility] OR;  Service: Bariatric   • KNEE ARTHROSCOPY Right    • WISDOM TOOTH EXTRACTION      ALL FOUR     Outpatient Prescriptions Marked as Taking for the 18 encounter (Office Visit) with Janine Timmons PA-C   Medication Sig Dispense Refill   • calcium carbonate (TUMS) 500 MG chewable tablet Chew 1 tablet Daily.     • Cranberry 1000 MG capsule Take  by mouth.     • Multiple Vitamins-Minerals (DAILY MULTIVITAMIN PO) Take  by mouth Daily.     • omeprazole (PRILOSEC) 40 MG capsule Take 1 capsule by mouth Daily. 90 capsule 0   • ursodiol (ACTIGALL) 300 MG capsule take 1 capsule by mouth twice a day 60 capsule 3   • vitamin D (ERGOCALCIFEROL) 55597 units capsule capsule Take 1 capsule by mouth 1 (One) Time Per Week. 12 capsule 0       No Known Allergies    Social History     Social History   • Marital status:      Spouse name: Bruce Ortiz   • Number of children: 2   • Years of education: Bachelor's     Occupational History   • Homemaker      Social History Main Topics   • Smoking status: Former Smoker     Packs/day: 0.50     Years: 8.00     Types: Cigarettes     Quit date:    • Smokeless tobacco: Never Used      Comment: uses vape w/ small amount of nicotine daily-smokeless   • Alcohol use Yes      Comment: 2-3 drinks weekly 2-3 drinks each time    • Drug use: No   • Sexual activity: Defer      Comment:       Other Topics Concern   • Not on file     Social History Narrative    Lives in [place], [place] w/  and 2 children.  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       /78 (BP Location: Left arm, Patient Position:  "Sitting, Cuff Size: Large Adult)   Pulse 68   Temp 97.8 °F (36.6 °C) (Temporal Artery )   Resp 18   Ht 170.2 cm (67\")   Wt 90.3 kg (199 lb)   SpO2 99%   BMI 31.17 kg/m²     Physical Exam   Constitutional: She is oriented to person, place, and time. She appears well-developed and well-nourished.   HENT:   Head: Normocephalic and atraumatic.   Cardiovascular: Normal rate, regular rhythm and normal heart sounds.    Pulmonary/Chest: Effort normal and breath sounds normal. No respiratory distress. She has no wheezes.   Abdominal: Soft. Bowel sounds are normal. There is tenderness (minimal diffuse TTP of upper abdomien).   Incisions healing well   Neurological: She is alert and oriented to person, place, and time.   Skin: Skin is warm and dry.   Psychiatric: She has a normal mood and affect. Her behavior is normal. Judgment and thought content normal.         Assessment:  4 months s/p LSG by  on 3/14/18    ICD-10-CM ICD-9-CM   1. Status post bariatric surgery Z98.84 V45.86   2. Abdominal pain, unspecified abdominal location R10.9 789.00         Plan:  Seemingly muscular abdominal soreness post strenuous activity/ persisting cough.  Advised follow up with PCP re: cough.  If abdominal pain continues, please let us know.  Reassured patient she has no activity restrictions at 4 month postop.     Otherwise, Continue w/ good food choices and healthy habits.  Continue protein 70-100g/day.  Continue fluids 64oz daily. Continue routine exercise.  Routine bariatric labs ordered.  Continue vitamins w/ adjustments pending lab results.  Call w/ problems/concerns.     The patient was instructed to follow up as schedule for routine followup, sooner if needed.      Total time spent w/ patient 25 minutes and 15 minutes spent counseling the patient on nutrition and necessary dietary/lifestyle modifications.      "

## 2018-07-30 LAB
25(OH)D3+25(OH)D2 SERPL-MCNC: 26.1 NG/ML
A-TOCOPHEROL VIT E SERPL-MCNC: 10.6 MG/L (ref 5.9–19.4)
ALBUMIN SERPL-MCNC: 4.3 G/DL (ref 3.2–4.8)
ALBUMIN/GLOB SERPL: 1.7 G/DL (ref 1.5–2.5)
ALP SERPL-CCNC: 80 U/L (ref 25–100)
ALT SERPL-CCNC: 34 U/L (ref 7–40)
AST SERPL-CCNC: 28 U/L (ref 0–33)
BASOPHILS # BLD AUTO: 0.03 10*3/MM3 (ref 0–0.2)
BASOPHILS NFR BLD AUTO: 0.4 % (ref 0–1)
BILIRUB SERPL-MCNC: 0.4 MG/DL (ref 0.3–1.2)
BUN SERPL-MCNC: 15 MG/DL (ref 9–23)
BUN/CREAT SERPL: 18.8 (ref 7–25)
CALCIUM SERPL-MCNC: 9.9 MG/DL (ref 8.7–10.4)
CHLORIDE SERPL-SCNC: 101 MMOL/L (ref 99–109)
CO2 SERPL-SCNC: 28 MMOL/L (ref 20–31)
CREAT SERPL-MCNC: 0.8 MG/DL (ref 0.6–1.3)
EOSINOPHIL # BLD AUTO: 0.6 10*3/MM3 (ref 0–0.3)
EOSINOPHIL NFR BLD AUTO: 7.6 % (ref 0–3)
ERYTHROCYTE [DISTWIDTH] IN BLOOD BY AUTOMATED COUNT: 12.6 % (ref 11.3–14.5)
FERRITIN SERPL-MCNC: 82 NG/ML (ref 10–291)
FOLATE SERPL-MCNC: 20.37 NG/ML (ref 3.2–20)
GAMMA TOCOPHEROL SERPL-MCNC: 0.6 MG/L (ref 0.7–4.9)
GLOBULIN SER CALC-MCNC: 2.5 GM/DL
GLUCOSE SERPL-MCNC: 118 MG/DL (ref 70–100)
HCT VFR BLD AUTO: 46.5 % (ref 34.5–44)
HGB BLD-MCNC: 15 G/DL (ref 11.5–15.5)
IMM GRANULOCYTES # BLD: 0.01 10*3/MM3 (ref 0–0.03)
IMM GRANULOCYTES NFR BLD: 0.1 % (ref 0–0.6)
IRON SERPL-MCNC: 79 MCG/DL (ref 50–175)
LYMPHOCYTES # BLD AUTO: 2.29 10*3/MM3 (ref 0.6–4.8)
LYMPHOCYTES NFR BLD AUTO: 29.1 % (ref 24–44)
MAGNESIUM SERPL-MCNC: 2 MG/DL (ref 1.3–2.7)
MCH RBC QN AUTO: 29.9 PG (ref 27–31)
MCHC RBC AUTO-ENTMCNC: 32.3 G/DL (ref 32–36)
MCV RBC AUTO: 92.8 FL (ref 80–99)
METHYLMALONATE SERPL-SCNC: 88 NMOL/L (ref 0–378)
MONOCYTES # BLD AUTO: 0.36 10*3/MM3 (ref 0–1)
MONOCYTES NFR BLD AUTO: 4.6 % (ref 0–12)
NEUTROPHILS # BLD AUTO: 4.59 10*3/MM3 (ref 1.5–8.3)
NEUTROPHILS NFR BLD AUTO: 58.2 % (ref 41–71)
PHOSPHATE SERPL-MCNC: 3.9 MG/DL (ref 2.4–5.1)
PLATELET # BLD AUTO: 256 10*3/MM3 (ref 150–450)
POTASSIUM SERPL-SCNC: 4.4 MMOL/L (ref 3.5–5.5)
PREALB SERPL-MCNC: 24 MG/DL (ref 14–35)
PROT SERPL-MCNC: 6.8 G/DL (ref 5.7–8.2)
PTH-INTACT SERPL-MCNC: 22 PG/ML (ref 15–65)
RBC # BLD AUTO: 5.01 10*6/MM3 (ref 3.89–5.14)
SODIUM SERPL-SCNC: 137 MMOL/L (ref 132–146)
VIT A SERPL-MCNC: 43.8 UG/DL (ref 31.2–89.1)
VIT B1 BLD-SCNC: 215.5 NMOL/L (ref 66.5–200)
WBC # BLD AUTO: 7.88 10*3/MM3 (ref 3.5–10.8)
ZINC SERPL-MCNC: 85 UG/DL (ref 56–134)

## 2018-08-07 ENCOUNTER — CONSULT (OUTPATIENT)
Dept: SLEEP MEDICINE | Facility: HOSPITAL | Age: 38
End: 2018-08-07

## 2018-08-07 VITALS
DIASTOLIC BLOOD PRESSURE: 64 MMHG | OXYGEN SATURATION: 96 % | HEIGHT: 66 IN | HEART RATE: 91 BPM | SYSTOLIC BLOOD PRESSURE: 130 MMHG | WEIGHT: 190.8 LBS | BODY MASS INDEX: 30.67 KG/M2

## 2018-08-07 DIAGNOSIS — R40.0 SLEEPINESS: ICD-10-CM

## 2018-08-07 DIAGNOSIS — E66.9 OBESITY (BMI 30-39.9): ICD-10-CM

## 2018-08-07 DIAGNOSIS — R29.818 SUSPECTED SLEEP APNEA: Primary | ICD-10-CM

## 2018-08-07 PROCEDURE — 99204 OFFICE O/P NEW MOD 45 MIN: CPT | Performed by: INTERNAL MEDICINE

## 2018-08-07 NOTE — PROGRESS NOTES
Zofia Ortiz is a 38 y.o. female.   Chief Complaint   Patient presents with   • Sleeping Problem       HPI     38 y.o. female seen in consultation at the request of Isabelle Berry,* for evaluation of the above.     She has a long-standing history of poor sleep quality with difficulty in sleep initiation and maintenance as well as daytime somnolence.  She underwent bariatric surgery in March and has lost 65 pounds thus far.  She was observed to have possible apneas at the time of surgery but her sleep symptoms have not improved with significant weight loss.  She was referred here for further evaluation.    Further details are as follows:    [place] Scale is: 1/24    Estimated average amount of sleep per night: 6  Number of times she wakes up at night: 2  Difficulty falling back asleep: yes  It usually takes 100 minutes to go to sleep.  She feels sleepy upon waking up: yes  Rotating or night shift work: yes    Drowsiness/Sleepiness:  She exhibits the following:  excessive daytime fatigue    Snoring/Breathing:  She exhibits the following:  awoken with dry mouth  quits breathing at night  awakens gasping for breath    Reflux:  She describes the following:  takes medication for reflux    Narcolepsy:  She exhibits the following:  feeling of paralysis while going to sleep or coming out of sleep    RLS/PLMs:  She describes the following:  moves or jerks during sleep  discomfort in legs with an urge to move them    Insomnia:  She describes the following:  problems initiating sleep at night  frequent awakenings  bothered by pain at night  restless sleep    Parasomnia:  She exhibits the following:  sleep walks  sleep talks  frequent nightmares    Neurological:  She has a history of the following:  none    Weight:  Weight change in the last year:  loss: 65 lbs      The patient's relevant past medical, surgical, family, and social history reviewed and updated in Epic as appropriate.    Current medications are:  "  Current Outpatient Prescriptions:   •  Cranberry 1000 MG capsule, Take  by mouth., Disp: , Rfl:   •  Multiple Vitamins-Minerals (DAILY MULTIVITAMIN PO), Take  by mouth Daily., Disp: , Rfl:   •  omeprazole (PRILOSEC) 40 MG capsule, Take 1 capsule by mouth Daily., Disp: 90 capsule, Rfl: 0  •  ursodiol (ACTIGALL) 300 MG capsule, take 1 capsule by mouth twice a day, Disp: 60 capsule, Rfl: 3  •  vitamin D (ERGOCALCIFEROL) 26391 units capsule capsule, Take 1 capsule by mouth 1 (One) Time Per Week., Disp: 12 capsule, Rfl: 0  •  calcium carbonate (TUMS) 500 MG chewable tablet, Chew 1 tablet Daily., Disp: , Rfl:   •  ondansetron ODT (ZOFRAN ODT) 4 MG disintegrating tablet, Take 1 tablet by mouth Every 8 (Eight) Hours As Needed for Nausea or Vomiting., Disp: 20 tablet, Rfl: 0.    Review of Systems    Review of Systems  ROS documented in patient questionnaire ×12 systems.  Reviewed with patient.  Otherwise negative except as noted in HPI.    Physical Exam    Blood pressure 130/64, pulse 91, height 167.6 cm (66\"), weight 86.5 kg (190 lb 12.8 oz), SpO2 96 %, not currently breastfeeding. Body mass index is 30.8 kg/m².    Physical Exam   Constitutional: She is oriented to person, place, and time. She appears well-developed and well-nourished.   HENT:   Head: Normocephalic and atraumatic.   Nose: Nose normal.   Mouth/Throat: Oropharynx is clear and moist. No oropharyngeal exudate.   Class II airway   Eyes: Conjunctivae are normal. No scleral icterus.   Neck: No tracheal deviation present. No thyromegaly present.   Cardiovascular: Normal rate and regular rhythm.  Exam reveals no gallop and no friction rub.    No murmur heard.  Pulmonary/Chest: Effort normal. No respiratory distress. She has no wheezes. She has no rales.   Musculoskeletal: She exhibits no edema or deformity.   Neurological: She is alert and oriented to person, place, and time.   Skin: Skin is warm and dry. No rash noted.   Psychiatric: She has a normal mood and " affect. Her behavior is normal.   Nursing note and vitals reviewed.      ASSESSMENT:    Problem List Items Addressed This Visit     Obesity (BMI 30-39.9)    Relevant Orders    Home Sleep Study    Suspected sleep apnea - Primary    Relevant Orders    Home Sleep Study    Sleepiness    Relevant Orders    Home Sleep Study          Evidence of obstructive sleep apnea based upon poor sleep quality, nonrestorative sleep, occasional snoring and witnessed apneas.    PLAN:    1. A home sleep apnea test would be appropriate.  2. I discussed the diagnostic process as well as treatment options for obstructive sleep apnea that is, in fact, diagnosed  3. We also discussed the potential diagnosis of chronic insomnia which could be further addressed if there is no evidence of treatable sleep apnea  4. I went over sleep hygiene with her but if insomnia is the primary issue, could consider cognitive behavioral therapy.  5. Further recommendations after her HSAT is completed    I have reviewed the results of my evaluation and impression and discussed my recommendations in detail with the patient.      Signed by  Vimal Wilkerson MD    August 7, 2018      CC: Morgan Arguello MD Ballard, Amber Kincaid,*

## 2018-08-20 RX ORDER — ERGOCALCIFEROL 1.25 MG/1
CAPSULE ORAL
Qty: 8 CAPSULE | Refills: 0 | OUTPATIENT
Start: 2018-08-20

## 2018-09-07 ENCOUNTER — HOSPITAL ENCOUNTER (OUTPATIENT)
Dept: SLEEP MEDICINE | Facility: HOSPITAL | Age: 38
Discharge: HOME | End: 2018-09-07
Attending: INTERNAL MEDICINE | Admitting: INTERNAL MEDICINE

## 2018-09-07 VITALS
DIASTOLIC BLOOD PRESSURE: 78 MMHG | BODY MASS INDEX: 29.8 KG/M2 | OXYGEN SATURATION: 92 % | HEIGHT: 66 IN | SYSTOLIC BLOOD PRESSURE: 130 MMHG | WEIGHT: 185.41 LBS | HEART RATE: 95 BPM

## 2018-09-07 DIAGNOSIS — R29.818 SUSPECTED SLEEP APNEA: ICD-10-CM

## 2018-09-07 DIAGNOSIS — E66.9 OBESITY (BMI 30-39.9): ICD-10-CM

## 2018-09-07 DIAGNOSIS — R40.0 SLEEPINESS: ICD-10-CM

## 2018-09-07 PROCEDURE — 95806 SLEEP STUDY UNATT&RESP EFFT: CPT

## 2018-09-07 PROCEDURE — 95806 SLEEP STUDY UNATT&RESP EFFT: CPT | Performed by: INTERNAL MEDICINE

## 2018-09-12 ENCOUNTER — OFFICE VISIT (OUTPATIENT)
Dept: BARIATRICS/WEIGHT MGMT | Facility: CLINIC | Age: 38
End: 2018-09-12

## 2018-09-12 VITALS
BODY MASS INDEX: 28.88 KG/M2 | OXYGEN SATURATION: 99 % | HEART RATE: 68 BPM | TEMPERATURE: 97.4 F | HEIGHT: 67 IN | SYSTOLIC BLOOD PRESSURE: 116 MMHG | WEIGHT: 184.01 LBS | RESPIRATION RATE: 18 BRPM | DIASTOLIC BLOOD PRESSURE: 82 MMHG

## 2018-09-12 DIAGNOSIS — G47.33 MILD OBSTRUCTIVE SLEEP APNEA: Primary | ICD-10-CM

## 2018-09-12 DIAGNOSIS — Z13.0 SCREENING FOR IRON DEFICIENCY ANEMIA: ICD-10-CM

## 2018-09-12 DIAGNOSIS — Z98.84 S/P BARIATRIC SURGERY: ICD-10-CM

## 2018-09-12 DIAGNOSIS — E55.9 VITAMIN D DEFICIENCY: ICD-10-CM

## 2018-09-12 DIAGNOSIS — R53.83 FATIGUE, UNSPECIFIED TYPE: ICD-10-CM

## 2018-09-12 DIAGNOSIS — R10.13 DYSPEPSIA: Primary | ICD-10-CM

## 2018-09-12 PROCEDURE — 99214 OFFICE O/P EST MOD 30 MIN: CPT | Performed by: PHYSICIAN ASSISTANT

## 2018-09-12 RX ORDER — OMEPRAZOLE 40 MG/1
40 CAPSULE, DELAYED RELEASE ORAL DAILY
Qty: 90 CAPSULE | Refills: 1 | Status: SHIPPED | OUTPATIENT
Start: 2018-09-12 | End: 2018-12-12 | Stop reason: SDUPTHER

## 2018-09-12 RX ORDER — VITAMIN B COMPLEX
CAPSULE ORAL DAILY
COMMUNITY
End: 2019-04-17

## 2018-09-12 NOTE — PROGRESS NOTES
[facility] Bariatric Surgery  [address]  [facility] [address] [phone]      Patient Name:  Zofia Ortiz.  :  1980      Date of Visit: 2018      Reason for Visit:  6 months postop    HPI:  Zofia Ortiz is a 38 y.o. female s/p LSG by  on 3/14/18    Feeling great - grateful.  Was dx just this AM w/ sleep apnea, but says she has likely gone undiagnosed for many years prior to surgery as well.  Does feel she still needs Omeprazole.  Also makes mention of some recent leg cramps in the night.  Continues on full vitamin regimen.  Labs 18 WNL.  Exercise - walking/swimming - very active.       Presurgery weight: 253 pounds.  Today's weight is 83.5 kg (184 lb 0.2 oz) pounds, today's  Body mass index is 28.82 kg/m²., and her weight loss since surgery is 69 pounds.       Past Medical History:   Diagnosis Date   • Abnormal glucose    • Body piercing     EARS ONLY   • Dyspepsia     rare heartburn sx's, serum h. pyl neg   • Dyspnea on exertion    • Elevated BP     156/106 @ Intake, denies hx HTN.     • Elevated transaminase level    • Fatigue    • Former smoker     Vapes now   • Morbid obesity (CMS/HCC)    •  (normal spontaneous vaginal delivery)     x 1 w/o complic   • Sleep apnea     newly dx, but suspects she has always had it   • Tattoos     NECK, LEFT THIGH, RIGHT ANKLE   • Vitamin D deficiency    • Wears partial dentures     UPPER ONLY     Past Surgical History:   Procedure Laterality Date   •  SECTION WITH TUBAL  2007   • CYSTECTOMY      ORAL   • ENDOSCOPY N/A 3/14/2018    Procedure: ESOPHAGOGASTRODUODENOSCOPY;  Surgeon: Glen Blackmon MD;  Location: [facility] OR;  Service: Bariatric   • GASTRIC SLEEVE LAPAROSCOPIC N/A 3/14/2018    Procedure: GASTRIC SLEEVE LAPAROSCOPIC;  Surgeon: Glen Blackmon MD;  Location: [facility] OR;  Service: Bariatric   • KNEE ARTHROSCOPY Right    • WISDOM TOOTH EXTRACTION      ALL FOUR     Outpatient  "Prescriptions Marked as Taking for the 9/12/18 encounter (Office Visit) with Keisha Marquez PA   Medication Sig Dispense Refill   • B Complex Vitamins (VITAMIN B COMPLEX) capsule capsule Take  by mouth Daily.     • calcium citrate-vitamin d (CITRACAL) 200-250 MG-UNIT tablet tablet Take  by mouth Daily.     • Cranberry 1000 MG capsule Take  by mouth.     • Multiple Vitamins-Minerals (DAILY MULTIVITAMIN PO) Take  by mouth Daily.     • omeprazole (PRILOSEC) 40 MG capsule Take 1 capsule by mouth Daily. 90 capsule 1   • vitamin D (ERGOCALCIFEROL) 69442 units capsule capsule Take 1 capsule by mouth 1 (One) Time Per Week. 12 capsule 0   • [DISCONTINUED] omeprazole (PRILOSEC) 40 MG capsule Take 1 capsule by mouth Daily. 90 capsule 0   • [DISCONTINUED] ursodiol (ACTIGALL) 300 MG capsule take 1 capsule by mouth twice a day 60 capsule 3     No Known Allergies    Social History     Social History   • Marital status:      Spouse name: Bruce Ortiz   • Number of children: 2   • Years of education: Bachelor's     Occupational History   • Homemaker      Social History Main Topics   • Smoking status: Former Smoker     Packs/day: 0.50     Years: 8.00     Types: Cigarettes     Quit date: 2013   • Smokeless tobacco: Never Used      Comment: uses vape w/ small amount of nicotine daily-smokeless   • Alcohol use Yes      Comment: 2-3 drinks weekly 2-3 drinks each time    • Drug use: Yes     Types: Marijuana      Comment: 3-5 a month to sleep   • Sexual activity: Defer      Comment:       Other Topics Concern   • Not on file     Social History Narrative    Lives in [place], [place] w/  and 2 children.  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       /82 (BP Location: Left arm, Patient Position: Sitting, Cuff Size: Large Adult)   Pulse 68   Temp 97.4 °F (36.3 °C) (Temporal Artery )   Resp 18   Ht 170.2 cm (67\")   Wt 83.5 kg (184 lb 0.2 oz)   SpO2 99%   BMI 28.82 kg/m²   Physical Exam   Constitutional: She appears " well-developed and well-nourished. She is cooperative.   HENT:   Mouth/Throat: Oropharynx is clear and moist and mucous membranes are normal.   Eyes: Conjunctivae are normal. No scleral icterus.   Cardiovascular: Normal rate.    Pulmonary/Chest: Effort normal.   Abdominal: Soft. There is no tenderness.   Musculoskeletal: Normal range of motion. She exhibits no edema.   Neurological: She is alert.   Skin: Skin is warm and dry. No rash noted.   Psychiatric: She has a normal mood and affect. Judgment normal.         Assessment:   6 months s/p LSG by  on 3/14/18    ICD-10-CM ICD-9-CM   1. Dyspepsia R10.13 536.8   2. Fatigue, unspecified type R53.83 780.79   3. Screening for iron deficiency anemia Z13.0 V78.0   4. Vitamin D deficiency E55.9 268.9   5. S/P bariatric surgery Z98.84 V45.86       Plan:  Continue w/ good food choices and healthy habits.  Will RF Omeprazole.  Routine labs ordered.  Continue vitamins w/ adjustments pending lab results.  Call w/ any other issues/concerns.    The patient was instructed to follow up in 3 months, sooner if needed.     Total time spent w/ patient 25 minutes and 15 minutes spent counseling the patient on nutrition and necessary dietary/lifestyle modifications.

## 2018-09-12 NOTE — PROGRESS NOTES
CALLED PATIENT AND ADVISED OF STUDY RESULTS. PATIENT VERBALIZED UNDERSTANDING AND WAS AGREEABLE TO PAP THERAPY. FAXED ORDER TO [facility] 09/12/18

## 2018-09-15 LAB
25(OH)D3+25(OH)D2 SERPL-MCNC: 90.4 NG/ML (ref 30–100)
ALBUMIN SERPL-MCNC: 4.4 G/DL (ref 3.5–5.5)
ALBUMIN/GLOB SERPL: 1.7 {RATIO} (ref 1.2–2.2)
ALP SERPL-CCNC: 75 IU/L (ref 39–117)
ALT SERPL-CCNC: 13 IU/L (ref 0–32)
AST SERPL-CCNC: 21 IU/L (ref 0–40)
BASOPHILS # BLD AUTO: 0 X10E3/UL (ref 0–0.2)
BASOPHILS NFR BLD AUTO: 1 %
BILIRUB SERPL-MCNC: 0.3 MG/DL (ref 0–1.2)
BUN SERPL-MCNC: 10 MG/DL (ref 6–20)
BUN/CREAT SERPL: 15 (ref 9–23)
CALCIUM SERPL-MCNC: 9.5 MG/DL (ref 8.7–10.2)
CHLORIDE SERPL-SCNC: 103 MMOL/L (ref 96–106)
CO2 SERPL-SCNC: 25 MMOL/L (ref 20–29)
CREAT SERPL-MCNC: 0.65 MG/DL (ref 0.57–1)
EOSINOPHIL # BLD AUTO: 0.7 X10E3/UL (ref 0–0.4)
EOSINOPHIL NFR BLD AUTO: 10 %
ERYTHROCYTE [DISTWIDTH] IN BLOOD BY AUTOMATED COUNT: 13.9 % (ref 12.3–15.4)
FERRITIN SERPL-MCNC: 70 NG/ML (ref 15–150)
FOLATE SERPL-MCNC: >20 NG/ML
GLOBULIN SER CALC-MCNC: 2.6 G/DL (ref 1.5–4.5)
GLUCOSE SERPL-MCNC: 101 MG/DL (ref 65–99)
HCT VFR BLD AUTO: 46.1 % (ref 34–46.6)
HGB BLD-MCNC: 15.4 G/DL (ref 11.1–15.9)
IMM GRANULOCYTES # BLD: 0 X10E3/UL (ref 0–0.1)
IMM GRANULOCYTES NFR BLD: 0 %
IRON SERPL-MCNC: 70 UG/DL (ref 27–159)
LYMPHOCYTES # BLD AUTO: 1.9 X10E3/UL (ref 0.7–3.1)
LYMPHOCYTES NFR BLD AUTO: 26 %
Lab: NORMAL
MCH RBC QN AUTO: 31.3 PG (ref 26.6–33)
MCHC RBC AUTO-ENTMCNC: 33.4 G/DL (ref 31.5–35.7)
MCV RBC AUTO: 94 FL (ref 79–97)
METHYLMALONATE SERPL-SCNC: 79 NMOL/L (ref 0–378)
MONOCYTES # BLD AUTO: 0.3 X10E3/UL (ref 0.1–0.9)
MONOCYTES NFR BLD AUTO: 4 %
NEUTROPHILS # BLD AUTO: 4.2 X10E3/UL (ref 1.4–7)
NEUTROPHILS NFR BLD AUTO: 59 %
PLATELET # BLD AUTO: 289 X10E3/UL (ref 150–379)
POTASSIUM SERPL-SCNC: 4.5 MMOL/L (ref 3.5–5.2)
PROT SERPL-MCNC: 7 G/DL (ref 6–8.5)
RBC # BLD AUTO: 4.92 X10E6/UL (ref 3.77–5.28)
SODIUM SERPL-SCNC: 143 MMOL/L (ref 134–144)
VIT B1 BLD-SCNC: 259 NMOL/L (ref 66.5–200)
WBC # BLD AUTO: 7.1 X10E3/UL (ref 3.4–10.8)

## 2018-11-30 ENCOUNTER — OFFICE VISIT (OUTPATIENT)
Dept: SLEEP MEDICINE | Facility: HOSPITAL | Age: 38
End: 2018-11-30

## 2018-11-30 VITALS
BODY MASS INDEX: 28.28 KG/M2 | OXYGEN SATURATION: 98 % | DIASTOLIC BLOOD PRESSURE: 71 MMHG | HEIGHT: 66 IN | HEART RATE: 76 BPM | WEIGHT: 176 LBS | SYSTOLIC BLOOD PRESSURE: 111 MMHG

## 2018-11-30 DIAGNOSIS — G47.33 OSA (OBSTRUCTIVE SLEEP APNEA): Primary | ICD-10-CM

## 2018-11-30 PROCEDURE — 99213 OFFICE O/P EST LOW 20 MIN: CPT | Performed by: NURSE PRACTITIONER

## 2018-11-30 NOTE — PROGRESS NOTES
Subjective: Follow-up        Chief Complaint:   Chief Complaint   Patient presents with   • Follow-up       HPI:    Zofia Ortiz is a 38 y.o. female here for follow-up of johnny.  Patient was last seen here for consult 8/7/18 for increased daytime somnolence and witnessed apneas.  She underwent a sleep study 9/8/18 that showed mild obstructive sleep apnea.  She was started on CPAP therapy.  Patient states she did not do well when first starting, however, think she is getting better.  She is sleeping 6 hours nightly and when she wears her machine feels rested upon awakening.  Her [place] score is 4/24.  She does understand the importance of being adherent to CPAP therapy and is going to keep trying to see if she can increase her use.        Current medications are:   Current Outpatient Medications:   •  B Complex Vitamins (VITAMIN B COMPLEX) capsule capsule, Take  by mouth Daily., Disp: , Rfl:   •  calcium citrate-vitamin d (CITRACAL) 200-250 MG-UNIT tablet tablet, Take  by mouth Daily., Disp: , Rfl:   •  Cranberry 1000 MG capsule, Take  by mouth., Disp: , Rfl:   •  Multiple Vitamins-Minerals (DAILY MULTIVITAMIN PO), Take  by mouth Daily., Disp: , Rfl:   •  omeprazole (PRILOSEC) 40 MG capsule, Take 1 capsule by mouth Daily., Disp: 90 capsule, Rfl: 1  •  vitamin D (ERGOCALCIFEROL) 76864 units capsule capsule, Take 1 capsule by mouth 1 (One) Time Per Week., Disp: 12 capsule, Rfl: 0.      The patient's relevant past medical, surgical, family and social history were reviewed and updated in Epic as appropriate.       Review of Systems   Respiratory: Positive for apnea.    Psychiatric/Behavioral: Positive for sleep disturbance.   All other systems reviewed and are negative.        Objective:    Physical Exam   Constitutional: She is oriented to person, place, and time. She appears well-developed and well-nourished.   Obese female   HENT:   Head: Normocephalic and atraumatic.   Mouth/Throat: Oropharynx is clear and moist.    Mallampati 2 anatomy   Eyes: Conjunctivae are normal.   Neck: Neck supple. No thyromegaly present.   Cardiovascular: Normal rate and regular rhythm.   Pulmonary/Chest: Effort normal and breath sounds normal.   Lymphadenopathy:     She has no cervical adenopathy.   Neurological: She is alert and oriented to person, place, and time.   Skin: Skin is warm and dry.   Psychiatric: She has a normal mood and affect. Her behavior is normal. Judgment and thought content normal.   Nursing note and vitals reviewed.    Download has been reviewed with patient.  Over the past 45 days she is use her machine 30 days.  Her greater than 4 hour use as at 40%.  Her 90% pressure is 9.4.  With AHI of 7.8.    ASSESSMENT/PLAN    Zofia was seen today for follow-up.    Diagnoses and all orders for this visit:    CHUY (obstructive sleep apnea)  -     CPAP Therapy            1. Counseled patient regarding multimodal approach with healthy nutrition, healthy sleep, regular physical activity, social activities, counseling, and medications. Encouraged to practice lateral sleep position. Avoid alcohol and sedatives close to bedtime.  2.   Patient does understand the importance of CPAP therapy and we will increase her use.  I will see her back in 8 weeks to recheck her AHI to make sure with her increase use that this number has went down.  However, it is down from original study results.  I have reviewed the results of my evaluation and impression and discussed my recommendations in detail with the patient.      Signed by  ANSELMO Krishnan    November 30, 2018      CC: Morgan Arguello MD          No ref. provider found

## 2018-11-30 NOTE — PATIENT INSTRUCTIONS

## 2018-12-12 ENCOUNTER — OFFICE VISIT (OUTPATIENT)
Dept: BARIATRICS/WEIGHT MGMT | Facility: CLINIC | Age: 38
End: 2018-12-12

## 2018-12-12 VITALS
BODY MASS INDEX: 27.08 KG/M2 | HEART RATE: 96 BPM | RESPIRATION RATE: 18 BRPM | DIASTOLIC BLOOD PRESSURE: 76 MMHG | WEIGHT: 168.5 LBS | HEIGHT: 66 IN | SYSTOLIC BLOOD PRESSURE: 128 MMHG | TEMPERATURE: 97.8 F | OXYGEN SATURATION: 99 %

## 2018-12-12 DIAGNOSIS — E55.9 HYPOVITAMINOSIS D: ICD-10-CM

## 2018-12-12 DIAGNOSIS — Z90.3 POSTGASTRECTOMY MALABSORPTION: ICD-10-CM

## 2018-12-12 DIAGNOSIS — Z98.84 STATUS POST BARIATRIC SURGERY: ICD-10-CM

## 2018-12-12 DIAGNOSIS — K91.2 POSTGASTRECTOMY MALABSORPTION: ICD-10-CM

## 2018-12-12 DIAGNOSIS — Z13.0 SCREENING, IRON DEFICIENCY ANEMIA: ICD-10-CM

## 2018-12-12 DIAGNOSIS — R53.83 FATIGUE, UNSPECIFIED TYPE: Primary | ICD-10-CM

## 2018-12-12 DIAGNOSIS — Z13.21 MALNUTRITION SCREEN: ICD-10-CM

## 2018-12-12 PROCEDURE — 99214 OFFICE O/P EST MOD 30 MIN: CPT | Performed by: SURGERY

## 2018-12-12 RX ORDER — OMEPRAZOLE 40 MG/1
40 CAPSULE, DELAYED RELEASE ORAL DAILY
Qty: 30 CAPSULE | Refills: 11 | Status: SHIPPED | OUTPATIENT
Start: 2018-12-12 | End: 2019-12-31

## 2018-12-12 NOTE — PROGRESS NOTES
"[facility] Bariatric Surgery  [address]  [facility] [address] [phone]        Patient Name:  Zofia Ortiz.  :  1980      Date of Visit: 2018      Reason for Visit:   9 months postop      HPI: Zofia Ortiz is a 38 y.o. female s/p LSG by  on 3/14/18      Doing well.  No major issues/concerns. Denies reflux, nausea, vomiting and abdominal pain.  Some dysphagia when she hasn't taken her omeprazole.  Feels like food \"sitting: in her chest.  When on omeprazole, no symptoms.  She says this symptom is really minor.  Getting 70g prot/day.  Drinking <64 fluid oz/day.  6 month labs revealed no deficiencies. Taking MVI, B12, B1, Calcium, Vit D, iron and Vit C.  On Omeprazole .  Exercise: crossfit, walking.       Presurgery weight: 253 pounds.  Today's weight is 76.4 kg (168 lb 8 oz) pounds, today's  Body mass index is 27.2 kg/m²., and her weight loss since surgery is 85 pounds.      Past Medical History:   Diagnosis Date   • Abnormal glucose    • Body piercing     EARS ONLY   • Dyspepsia     rare heartburn sx's, serum h. pyl neg   • Dyspnea on exertion    • Elevated BP     156/106 @ Intake, denies hx HTN.     • Elevated transaminase level    • Fatigue    • Former smoker     Vapes now   • Morbid obesity (CMS/HCC)    •  (normal spontaneous vaginal delivery)     x 1 w/o complic   • Sleep apnea     newly dx, but suspects she has always had it   • Sleep apnea, obstructive 2018   • Tattoos     NECK, LEFT THIGH, RIGHT ANKLE   • Vitamin D deficiency    • Wears partial dentures     UPPER ONLY     Past Surgical History:   Procedure Laterality Date   •  SECTION WITH TUBAL     • CYSTECTOMY      ORAL   • KNEE ARTHROSCOPY Right    • WISDOM TOOTH EXTRACTION      ALL FOUR     Outpatient Medications Marked as Taking for the 18 encounter (Office Visit) with Isabell Montana MD   Medication Sig Dispense Refill   • B Complex Vitamins " "(VITAMIN B COMPLEX) capsule capsule Take  by mouth Daily.     • calcium citrate-vitamin d (CITRACAL) 200-250 MG-UNIT tablet tablet Take  by mouth Daily.     • Cranberry 1000 MG capsule Take  by mouth.     • Multiple Vitamins-Minerals (DAILY MULTIVITAMIN PO) Take  by mouth Daily.     • omeprazole (PRILOSEC) 40 MG capsule Take 1 capsule by mouth Daily. 30 capsule 11   • vitamin D (ERGOCALCIFEROL) 31568 units capsule capsule Take 1 capsule by mouth 1 (One) Time Per Week. 12 capsule 0   • [DISCONTINUED] omeprazole (PRILOSEC) 40 MG capsule Take 1 capsule by mouth Daily. 90 capsule 1       No Known Allergies    Social History     Socioeconomic History   • Marital status:      Spouse name: Bruce Ortiz   • Number of children: 2   • Years of education: Bachelor's   • Highest education level: Not on file   Social Needs   • Financial resource strain: Not on file   • Food insecurity - worry: Not on file   • Food insecurity - inability: Not on file   • Transportation needs - medical: Not on file   • Transportation needs - non-medical: Not on file   Occupational History   • Occupation: Homemaker   Tobacco Use   • Smoking status: Former Smoker     Packs/day: 0.50     Years: 8.00     Pack years: 4.00     Types: Cigarettes     Last attempt to quit: 2013     Years since quittin.9   • Smokeless tobacco: Never Used   • Tobacco comment: uses vape w/ small amount of nicotine daily-smokeless   Substance and Sexual Activity   • Alcohol use: Yes     Comment: Occasional   • Drug use: Yes     Types: Marijuana     Comment: 3-5 a month to sleep   • Sexual activity: Yes     Partners: Male     Comment:     Other Topics Concern   • Not on file   Social History Narrative    Lives in [place], [place] w/  and 2 children.  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       /76 (BP Location: Left arm, Patient Position: Sitting, Cuff Size: Large Adult)   Pulse 96   Temp 97.8 °F (36.6 °C) (Temporal)   Resp 18   Ht 167.6 cm (66\")   Wt 76.4 kg " (168 lb 8 oz)   SpO2 99%   BMI 27.20 kg/m²     Physical Exam   Constitutional: She is oriented to person, place, and time. She appears well-developed and well-nourished. No distress.   HENT:   Head: Normocephalic and atraumatic.   Mouth/Throat: No oropharyngeal exudate.   Eyes: Conjunctivae and EOM are normal. Pupils are equal, round, and reactive to light.   Pulmonary/Chest: Effort normal. No respiratory distress.   Abdominal: Soft. She exhibits no distension.   Neurological: She is alert and oriented to person, place, and time. No cranial nerve deficit.   Skin: Skin is warm and dry. She is not diaphoretic. No pallor.   Psychiatric: She has a normal mood and affect. Her behavior is normal. Thought content normal.         Assessment:  9 months s/p LSG by  on 3/14/18      ICD-10-CM ICD-9-CM   1. Fatigue, unspecified type R53.83 780.79   2. 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 Screening, iron deficiency anemia Z13.0 V78.0   5. Malnutrition screen Z13.21 V77.2   6. Status post bariatric surgery Z98.84 V45.86         Plan:  Doing well. Continue w/ good food choices and healthy habits.  Continue protein >70g/day.  Continue routine exercise.  Routine bariatric labs ordered.  Continue vitamins w/ adjustments pending lab results.  Call w/ problems/concerns.     The patient was instructed to follow up in 3 months, sooner if needed.    Refill for omeprazole given.    note: approx 15 of the 25 minute visit was spent counseling on nutrition and necessary dietary/lifestyle modifications.    Isabell Montana MD

## 2018-12-20 LAB
25(OH)D3+25(OH)D2 SERPL-MCNC: 89.2 NG/ML (ref 30–100)
ALBUMIN SERPL-MCNC: 4.6 G/DL (ref 3.5–5.5)
ALBUMIN/GLOB SERPL: 1.9 {RATIO} (ref 1.2–2.2)
ALP SERPL-CCNC: 61 IU/L (ref 39–117)
ALT SERPL-CCNC: 18 IU/L (ref 0–32)
AST SERPL-CCNC: 22 IU/L (ref 0–40)
BASOPHILS # BLD AUTO: 0.1 X10E3/UL (ref 0–0.2)
BASOPHILS NFR BLD AUTO: 1 %
BILIRUB SERPL-MCNC: 0.2 MG/DL (ref 0–1.2)
BUN SERPL-MCNC: 8 MG/DL (ref 6–20)
BUN/CREAT SERPL: 8 (ref 9–23)
CALCIUM SERPL-MCNC: 9.6 MG/DL (ref 8.7–10.2)
CHLORIDE SERPL-SCNC: 101 MMOL/L (ref 96–106)
CO2 SERPL-SCNC: 25 MMOL/L (ref 20–29)
CREAT SERPL-MCNC: 1.01 MG/DL (ref 0.57–1)
EOSINOPHIL # BLD AUTO: 0.3 X10E3/UL (ref 0–0.4)
EOSINOPHIL NFR BLD AUTO: 5 %
ERYTHROCYTE [DISTWIDTH] IN BLOOD BY AUTOMATED COUNT: 12.3 % (ref 12.3–15.4)
FERRITIN SERPL-MCNC: 72 NG/ML (ref 15–150)
FOLATE SERPL-MCNC: 19.9 NG/ML
GLOBULIN SER CALC-MCNC: 2.4 G/DL (ref 1.5–4.5)
GLUCOSE SERPL-MCNC: 74 MG/DL (ref 65–99)
HCT VFR BLD AUTO: 44.4 % (ref 34–46.6)
HGB BLD-MCNC: 15.4 G/DL (ref 11.1–15.9)
IMM GRANULOCYTES # BLD: 0 X10E3/UL (ref 0–0.1)
IMM GRANULOCYTES NFR BLD: 0 %
IRON SERPL-MCNC: 58 UG/DL (ref 27–159)
LYMPHOCYTES # BLD AUTO: 3.4 X10E3/UL (ref 0.7–3.1)
LYMPHOCYTES NFR BLD AUTO: 55 %
Lab: NORMAL
MCH RBC QN AUTO: 32 PG (ref 26.6–33)
MCHC RBC AUTO-ENTMCNC: 34.7 G/DL (ref 31.5–35.7)
MCV RBC AUTO: 92 FL (ref 79–97)
METHYLMALONATE SERPL-SCNC: 98 NMOL/L (ref 0–378)
MONOCYTES # BLD AUTO: 0.4 X10E3/UL (ref 0.1–0.9)
MONOCYTES NFR BLD AUTO: 7 %
NEUTROPHILS # BLD AUTO: 2 X10E3/UL (ref 1.4–7)
NEUTROPHILS NFR BLD AUTO: 32 %
PLATELET # BLD AUTO: 306 X10E3/UL (ref 150–379)
POTASSIUM SERPL-SCNC: 4.9 MMOL/L (ref 3.5–5.2)
PREALB SERPL-MCNC: 25 MG/DL (ref 14–35)
PROT SERPL-MCNC: 7 G/DL (ref 6–8.5)
RBC # BLD AUTO: 4.81 X10E6/UL (ref 3.77–5.28)
SODIUM SERPL-SCNC: 143 MMOL/L (ref 134–144)
VIT B1 BLD-SCNC: 242.4 NMOL/L (ref 66.5–200)
WBC # BLD AUTO: 6.1 X10E3/UL (ref 3.4–10.8)

## 2019-01-29 ENCOUNTER — OFFICE VISIT (OUTPATIENT)
Dept: SLEEP MEDICINE | Facility: HOSPITAL | Age: 39
End: 2019-01-29

## 2019-01-29 VITALS
WEIGHT: 165.4 LBS | SYSTOLIC BLOOD PRESSURE: 134 MMHG | HEIGHT: 66 IN | DIASTOLIC BLOOD PRESSURE: 78 MMHG | OXYGEN SATURATION: 98 % | BODY MASS INDEX: 26.58 KG/M2 | HEART RATE: 104 BPM

## 2019-01-29 DIAGNOSIS — G47.33 OSA (OBSTRUCTIVE SLEEP APNEA): Primary | ICD-10-CM

## 2019-01-29 PROCEDURE — 99213 OFFICE O/P EST LOW 20 MIN: CPT | Performed by: NURSE PRACTITIONER

## 2019-01-29 NOTE — PROGRESS NOTES
Subjective: Follow-up        Chief Complaint:   Chief Complaint   Patient presents with   • Follow-up       HPI:    Zofia Ortiz is a 38 y.o. female here for follow-up of obstructive sleep apnea.  Patient was last seen 11:30 with 18 for mild sleeps apnea and started on CPAP therapy.  Patient states she is still having trouble getting used to the mask.  She does have some leak and seems to make her nose congested and give her some sinus pressure.  She thinks she is getting better with this each day.  She is sleeping 6-8 hours nightly and feels rested upon awakening.  Her [place] score is 3/24.  Patient is also interested in the oral mandibular device is going to research this at home tonight and then talk to her dentist before letting me know what she would like to do.  Patient wishes her starting pressure was higher so she could feel she was getting more pressure right from the beginning.  She is turned her ramp off and that has helped some.    Past Medical History:   Diagnosis Date   • Abnormal glucose    • Body piercing     EARS ONLY   • Dyspepsia     rare heartburn sx's, serum h. pyl neg   • Dyspnea on exertion    • Elevated BP     156/106 @ Intake, denies hx HTN.     • Elevated transaminase level    • Fatigue    • Former smoker     Vapes now   • Morbid obesity (CMS/HCC)    •  (normal spontaneous vaginal delivery)     x 1 w/o complic   • Sleep apnea     newly dx, but suspects she has always had it   • Sleep apnea, obstructive 2018   • Tattoos     NECK, LEFT THIGH, RIGHT ANKLE   • Vitamin D deficiency    • Wears partial dentures     UPPER ONLY       Past Surgical History:   Procedure Laterality Date   •  SECTION WITH TUBAL  2007   • CYSTECTOMY      ORAL   • ENDOSCOPY N/A 3/14/2018    Procedure: ESOPHAGOGASTRODUODENOSCOPY;  Surgeon: Glen Blackmon MD;  [facility];  Service: Bariatric   • GASTRIC SLEEVE LAPAROSCOPIC N/A 3/14/2018    Procedure: GASTRIC SLEEVE LAPAROSCOPIC;   Surgeon: Glen Blackmon MD;  Location: [facility];  Service: Bariatric   • KNEE ARTHROSCOPY Right 2013   • WISDOM TOOTH EXTRACTION      ALL FOUR       Current medications are:   Current Outpatient Medications:   •  B Complex Vitamins (VITAMIN B COMPLEX) capsule capsule, Take  by mouth Daily., Disp: , Rfl:   •  calcium citrate-vitamin d (CITRACAL) 200-250 MG-UNIT tablet tablet, Take  by mouth Daily., Disp: , Rfl:   •  Cranberry 1000 MG capsule, Take  by mouth., Disp: , Rfl:   •  Multiple Vitamins-Minerals (DAILY MULTIVITAMIN PO), Take  by mouth Daily., Disp: , Rfl:   •  omeprazole (PRILOSEC) 40 MG capsule, Take 1 capsule by mouth Daily., Disp: 30 capsule, Rfl: 11  •  vitamin D (ERGOCALCIFEROL) 35793 units capsule capsule, Take 1 capsule by mouth 1 (One) Time Per Week., Disp: 12 capsule, Rfl: 0.      The patient's relevant past medical, surgical, family and social history were reviewed and updated in Epic as appropriate.       Review of Systems   Respiratory: Positive for apnea.    Gastrointestinal:        Reflux   Psychiatric/Behavioral: Positive for sleep disturbance.         Objective:    Physical Exam   Constitutional: She is oriented to person, place, and time. She appears well-developed and well-nourished.   HENT:   Head: Normocephalic and atraumatic.   Mouth/Throat: Oropharynx is clear and moist.   Mallampati 2 anatomy   Eyes: Conjunctivae are normal.   Neck: Neck supple. No thyromegaly present.   Cardiovascular: Normal rate and regular rhythm.   Pulmonary/Chest: Effort normal and breath sounds normal.   Lymphadenopathy:     She has no cervical adenopathy.   Neurological: She is alert and oriented to person, place, and time.   Skin: Skin is warm and dry.   Psychiatric: She has a normal mood and affect. Her behavior is normal. Judgment and thought content normal.   Nursing note and vitals reviewed.    52/60 days of use.  AHI 10.3.  90% pressure 10.2 cm H2O.  Greater than 4 hour use  68.3%.    ASSESSMENT/PLAN    Zofia was seen today for follow-up.    Diagnoses and all orders for this visit:    CHUY (obstructive sleep apnea)  -     CPAP Therapy            1. Counseled patient regarding multimodal approach with healthy nutrition, healthy sleep, regular physical activity, social activities, counseling, and medications. Encouraged to practice lateral sleep position. Avoid alcohol and sedatives close to bedtime.  2.   Patient's AHI is elevated and 90% pressure is 10.2 cm H2O.  I have increased her minimum pressure to 10 to see if this does help.  I'll see her back in 8 weeks.  She is also going to speak to her dentist about an oral mandibular advancement device and let me know regarding this.  I have reviewed the results of my evaluation and impression and discussed my recommendations in detail with the patient.      Signed by  ANSELMO Krishnan    January 29, 2019      CC: Morgan Arguello MD          No ref. provider found

## 2019-01-29 NOTE — PATIENT INSTRUCTIONS

## 2019-03-29 ENCOUNTER — OFFICE VISIT (OUTPATIENT)
Dept: SLEEP MEDICINE | Facility: HOSPITAL | Age: 39
End: 2019-03-29

## 2019-03-29 VITALS
DIASTOLIC BLOOD PRESSURE: 66 MMHG | RESPIRATION RATE: 20 BRPM | OXYGEN SATURATION: 99 % | BODY MASS INDEX: 26.97 KG/M2 | TEMPERATURE: 98 F | HEART RATE: 53 BPM | SYSTOLIC BLOOD PRESSURE: 110 MMHG | WEIGHT: 167 LBS

## 2019-03-29 DIAGNOSIS — G47.33 OSA (OBSTRUCTIVE SLEEP APNEA): Primary | ICD-10-CM

## 2019-03-29 DIAGNOSIS — G47.19 EXCESSIVE DAYTIME SLEEPINESS: ICD-10-CM

## 2019-03-29 PROCEDURE — 99213 OFFICE O/P EST LOW 20 MIN: CPT | Performed by: NURSE PRACTITIONER

## 2019-03-29 RX ORDER — POTASSIUM CHLORIDE 750 MG/1
10 TABLET, FILM COATED, EXTENDED RELEASE ORAL 2 TIMES DAILY
COMMUNITY

## 2019-03-29 RX ORDER — FOLIC ACID 1 MG/1
1 TABLET ORAL DAILY
COMMUNITY
End: 2021-09-30

## 2019-03-29 RX ORDER — SENNOSIDES 8.6 MG
TABLET ORAL NIGHTLY
COMMUNITY

## 2019-03-29 NOTE — PROGRESS NOTES
Subjective: Follow-up        Chief Complaint:   Chief Complaint   Patient presents with   • Follow-up       HPI:    Zofia Ortiz is a 39 y.o. female here for follow-up of johnny.  Patient was last seen by me 1/29/2018 for obstructive sleep apnea.  I did adjust patient's minimum pressure after reviewing 90% pressure to help lower her AHI.  Patient's AHI is remaining elevated.  Patient is sleeping 4-8 hours a night and does not feel rested upon awakening.  Patient states she  feels 0% better since her last visit.  She has an [place] score of 0/24.  She does wish to continue to see if her sleepiness will decrease.        Current medications are:   Current Outpatient Medications:   •  B Complex Vitamins (VITAMIN B COMPLEX) capsule capsule, Take  by mouth Daily., Disp: , Rfl:   •  calcium citrate-vitamin d (CITRACAL) 200-250 MG-UNIT tablet tablet, Take  by mouth Daily., Disp: , Rfl:   •  Cranberry 1000 MG capsule, Take  by mouth., Disp: , Rfl:   •  folic acid (FOLVITE) 1 MG tablet, Take 1 mg by mouth Daily., Disp: , Rfl:   •  Jayleen, Zingiber officinalis, (JAYLEEN ROOT PO), Take  by mouth., Disp: , Rfl:   •  magnesium oxide (MAGOX) 400 (241.3 Mg) MG tablet tablet, Take 400 mg by mouth Daily., Disp: , Rfl:   •  Multiple Vitamins-Minerals (DAILY MULTIVITAMIN PO), Take  by mouth Daily., Disp: , Rfl:   •  omeprazole (PRILOSEC) 40 MG capsule, Take 1 capsule by mouth Daily., Disp: 30 capsule, Rfl: 11  •  potassium chloride (K-DUR) 10 MEQ CR tablet, Take 10 mEq by mouth 2 (Two) Times a Day., Disp: , Rfl:   •  senna (SENOKOT) 8.6 MG tablet tablet, Take  by mouth Every Night., Disp: , Rfl:   •  vitamin D (ERGOCALCIFEROL) 07876 units capsule capsule, Take 1 capsule by mouth 1 (One) Time Per Week., Disp: 12 capsule, Rfl: 0.      The patient's relevant past medical, surgical, family and social history were reviewed and updated in Epic as appropriate.       Review of Systems   Constitutional: Positive for fatigue.   Respiratory:  Positive for apnea.    Psychiatric/Behavioral: Positive for sleep disturbance.   All other systems reviewed and are negative.        Objective:    Physical Exam   Constitutional: She is oriented to person, place, and time. She appears well-developed and well-nourished.   HENT:   Head: Normocephalic and atraumatic.   Mouth/Throat: Oropharynx is clear and moist.   Mallampati 2 anatomy   Eyes: Conjunctivae are normal.   Neck: Neck supple. No thyromegaly present.   Cardiovascular: Normal rate and regular rhythm.   Pulmonary/Chest: Effort normal and breath sounds normal.   Lymphadenopathy:     She has no cervical adenopathy.   Neurological: She is alert and oriented to person, place, and time.   Skin: Skin is warm and dry.   Psychiatric: She has a normal mood and affect. Her behavior is normal. Judgment and thought content normal.   Nursing note and vitals reviewed.    28/30 days of use.  Greater than 4-hour use 76.7%.  90% pressure 11.6 cm H2O.  AHI of 8.1.  Download reviewed with patient.    ASSESSMENT/PLAN    Zofia was seen today for follow-up.    Diagnoses and all orders for this visit:    CHUY (obstructive sleep apnea)  -     Polysomnography 4 or More Parameters With CPAP; Future    Excessive daytime sleepiness  -     Polysomnography 4 or More Parameters With CPAP; Future            1. Counseled patient regarding multimodal approach with healthy nutrition, healthy sleep, regular physical activity, social activities, counseling, and medications. Encouraged to practice lateral sleep position. Avoid alcohol and sedatives close to bedtime.  2. Patient continues with excessive daytime sleepiness and an elevated AHI will get a titration study and see patient back after study.    I have reviewed the results of my evaluation and impression and discussed my recommendations in detail with the patient.      Signed by  ANSELMO Krishnan    March 29, 2019      CC: Morgan Arguello MD          No ref. provider  found

## 2019-04-04 ENCOUNTER — HOSPITAL ENCOUNTER (OUTPATIENT)
Dept: SLEEP MEDICINE | Facility: HOSPITAL | Age: 39
Discharge: HOME | End: 2019-04-04
Admitting: NURSE PRACTITIONER

## 2019-04-04 VITALS
BODY MASS INDEX: 26.89 KG/M2 | DIASTOLIC BLOOD PRESSURE: 61 MMHG | OXYGEN SATURATION: 97 % | HEART RATE: 70 BPM | SYSTOLIC BLOOD PRESSURE: 129 MMHG | WEIGHT: 167.33 LBS | HEIGHT: 66 IN

## 2019-04-04 DIAGNOSIS — G47.33 OSA (OBSTRUCTIVE SLEEP APNEA): ICD-10-CM

## 2019-04-04 DIAGNOSIS — G47.19 EXCESSIVE DAYTIME SLEEPINESS: ICD-10-CM

## 2019-04-04 PROCEDURE — 95811 POLYSOM 6/>YRS CPAP 4/> PARM: CPT

## 2019-04-04 PROCEDURE — 95811 POLYSOM 6/>YRS CPAP 4/> PARM: CPT | Performed by: INTERNAL MEDICINE

## 2019-04-05 DIAGNOSIS — G47.19 EXCESSIVE DAYTIME SLEEPINESS: ICD-10-CM

## 2019-04-05 DIAGNOSIS — G47.33 OSA (OBSTRUCTIVE SLEEP APNEA): Primary | ICD-10-CM

## 2019-04-11 ENCOUNTER — OFFICE VISIT (OUTPATIENT)
Dept: BARIATRICS/WEIGHT MGMT | Facility: CLINIC | Age: 39
End: 2019-04-11

## 2019-04-11 VITALS
HEART RATE: 70 BPM | TEMPERATURE: 97 F | RESPIRATION RATE: 18 BRPM | DIASTOLIC BLOOD PRESSURE: 78 MMHG | BODY MASS INDEX: 26.03 KG/M2 | OXYGEN SATURATION: 100 % | SYSTOLIC BLOOD PRESSURE: 116 MMHG | WEIGHT: 162 LBS | HEIGHT: 66 IN

## 2019-04-11 DIAGNOSIS — Z13.21 MALNUTRITION SCREEN: ICD-10-CM

## 2019-04-11 DIAGNOSIS — R53.83 FATIGUE, UNSPECIFIED TYPE: ICD-10-CM

## 2019-04-11 DIAGNOSIS — Z90.3 POSTGASTRECTOMY MALABSORPTION: ICD-10-CM

## 2019-04-11 DIAGNOSIS — E55.9 HYPOVITAMINOSIS D: ICD-10-CM

## 2019-04-11 DIAGNOSIS — K91.2 POSTGASTRECTOMY MALABSORPTION: ICD-10-CM

## 2019-04-11 DIAGNOSIS — E66.3 OVERWEIGHT (BMI 25.0-29.9): ICD-10-CM

## 2019-04-11 DIAGNOSIS — Z98.84 STATUS POST BARIATRIC SURGERY: Primary | ICD-10-CM

## 2019-04-11 DIAGNOSIS — Z13.0 SCREENING, IRON DEFICIENCY ANEMIA: ICD-10-CM

## 2019-04-11 PROCEDURE — 99214 OFFICE O/P EST MOD 30 MIN: CPT | Performed by: PHYSICIAN ASSISTANT

## 2019-04-11 NOTE — PROGRESS NOTES
"[facility] Bariatric Surgery  [address]  [facility] [id number] [phone]        Patient Name:  Zofia Ortiz.  :  1980        Reason for Visit:   Annual Eval 13 months postop     HPI: Zofia Ortiz is a 39 y.o. female s/p LSG by  on 3/14/18    Doing well.  No issues/concerns. Very pleased with progress, was in the \"normal\" BMI range and thrilled.Denies dysphagia, reflux, nausea, vomiting and abdominal pain.  Getting 75-100g prot/day, protein shakes + packets twice a day. Eating small amounts frequently. Unsure on calories.  Eggs, lettace wraps, pretzels, yogurt, cottage cheese, turkey/ meats.    Drinking 64+ fluid oz/day, water + diet pepsi 1x a day.  Last labs revealed bariatric levels wnl, cont vitamin regimen .  Taking MVI, Calcium, Vit D, iron, Vit C and B complex 2700 daily.  On Omeprazole .  Exercising.     Presurgery weight: 253 pounds.  Today's weight is 73.5 kg (162 lb) pounds, today's  Body mass index is 26.15 kg/m²., and her weight loss since surgery is 91 pounds.      Past Medical History:   Diagnosis Date   • Abnormal glucose    • Body piercing     EARS ONLY   • Dyspepsia     rare heartburn sx's, serum h. pyl neg   • Dyspnea on exertion    • Elevated BP     156/106 @ Intake, denies hx HTN.     • Elevated transaminase level    • Fatigue    • Former smoker     Vapes now   • Morbid obesity (CMS/HCC)    •  (normal spontaneous vaginal delivery)     x 1 w/o complic   • Sleep apnea     newly dx, but suspects she has always had it   • Sleep apnea, obstructive 2018   • Tattoos     NECK, LEFT THIGH, RIGHT ANKLE   • Vitamin D deficiency    • Wears partial dentures     UPPER ONLY     Past Surgical History:   Procedure Laterality Date   •  SECTION WITH TUBAL  2007   • CYSTECTOMY      ORAL   • ENDOSCOPY N/A 3/14/2018    Procedure: ESOPHAGOGASTRODUODENOSCOPY;  Surgeon: Glen Blackmon MD;  Location: [facility] OR;  Service: Bariatric   • " GASTRIC SLEEVE LAPAROSCOPIC N/A 3/14/2018    Procedure: GASTRIC SLEEVE LAPAROSCOPIC;  Surgeon: Glen Blackmon MD;  Location: [facility];  Service: Bariatric   • KNEE ARTHROSCOPY Right    • WISDOM TOOTH EXTRACTION      ALL FOUR     Outpatient Medications Marked as Taking for the 19 encounter (Office Visit) with Janine Timmons PA-C   Medication Sig Dispense Refill   • B Complex Vitamins (VITAMIN B COMPLEX) capsule capsule Take  by mouth Daily.     • calcium citrate-vitamin d (CITRACAL) 200-250 MG-UNIT tablet tablet Take  by mouth Daily.     • Cranberry 1000 MG capsule Take  by mouth.     • folic acid (FOLVITE) 1 MG tablet Take 1 mg by mouth Daily.     • magnesium oxide (MAGOX) 400 (241.3 Mg) MG tablet tablet Take 400 mg by mouth Daily.     • Multiple Vitamins-Minerals (DAILY MULTIVITAMIN PO) Take  by mouth Daily.     • omeprazole (PRILOSEC) 40 MG capsule Take 1 capsule by mouth Daily. 30 capsule 11   • potassium chloride (K-DUR) 10 MEQ CR tablet Take 10 mEq by mouth 2 (Two) Times a Day.     • senna (SENOKOT) 8.6 MG tablet tablet Take  by mouth Every Night.     • vitamin D (ERGOCALCIFEROL) 75240 units capsule capsule Take 1 capsule by mouth 1 (One) Time Per Week. 12 capsule 0       No Known Allergies    Social History     Socioeconomic History   • Marital status:      Spouse name: Bruce Ortiz   • Number of children: 2   • Years of education: Bachelor's   • Highest education level: Not on file   Occupational History   • Occupation: Homemaker   Tobacco Use   • Smoking status: Former Smoker     Packs/day: 0.50     Years: 8.00     Pack years: 4.00     Types: Cigarettes     Last attempt to quit:      Years since quittin.2   • Smokeless tobacco: Never Used   • Tobacco comment: uses vape w/ small amount of nicotine daily-smokeless   Substance and Sexual Activity   • Alcohol use: Yes     Comment: Occasional   • Drug use: Yes     Types: Marijuana     Comment: 3-5 a month to sleep   • Sexual  "activity: Yes     Partners: Male     Comment:     Social History Narrative    Lives in [place], [place] w/  and 2 children.  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       /78 (BP Location: Left arm, Patient Position: Sitting, Cuff Size: Adult)   Pulse 70   Temp 97 °F (36.1 °C) (Temporal)   Resp 18   Ht 167.6 cm (66\")   Wt 73.5 kg (162 lb)   SpO2 100%   BMI 26.15 kg/m²     Physical Exam   Constitutional: She is oriented to person, place, and time. She appears well-developed and well-nourished.   HENT:   Head: Normocephalic and atraumatic.   Cardiovascular: Normal rate, regular rhythm and normal heart sounds.   Pulmonary/Chest: Effort normal and breath sounds normal. No respiratory distress. She has no wheezes.   Abdominal: Soft. Bowel sounds are normal. She exhibits no distension.   Neurological: She is alert and oriented to person, place, and time.   Skin: Skin is warm and dry.   Psychiatric: She has a normal mood and affect. Her behavior is normal. Judgment and thought content normal.         Assessment:  13 months s/p LSG by  on 3/14/18    ICD-10-CM ICD-9-CM   1. Status post bariatric surgery Z98.84 V45.86   2. Fatigue, unspecified type R53.83 780.79   3. Hypovitaminosis D E55.9 268.9   4. Screening, iron deficiency anemia Z13.0 V78.0   5. Malnutrition screen Z13.21 V77.2   6. Postgastrectomy malabsorption K91.2 579.3    Z90.3    7. Overweight (BMI 25.0-29.9) E66.3 278.02         Plan:  Doing well. Discussed tanita. Continue w/ good food choices and healthy habits.  Continue to focus on high protein, low carb.  Keep tracking intake.  Continue routine exercise.  Routine bariatric labs ordered.  Continue vitamins w/ adjustments pending lab results.  Call w/ problems/concerns.     The patient was instructed to follow up in 6 months,  sooner if needed.      Total time spent w/ patient 25 minutes and 15 minutes spent counseling the patient on nutrition and necessary dietary/lifestyle " modifications.

## 2019-04-12 ENCOUNTER — OFFICE VISIT (OUTPATIENT)
Dept: SLEEP MEDICINE | Facility: HOSPITAL | Age: 39
End: 2019-04-12

## 2019-04-12 VITALS
OXYGEN SATURATION: 98 % | WEIGHT: 164.4 LBS | BODY MASS INDEX: 26.42 KG/M2 | SYSTOLIC BLOOD PRESSURE: 109 MMHG | HEIGHT: 66 IN | HEART RATE: 62 BPM | DIASTOLIC BLOOD PRESSURE: 69 MMHG

## 2019-04-12 DIAGNOSIS — G47.33 OSA (OBSTRUCTIVE SLEEP APNEA): Primary | ICD-10-CM

## 2019-04-12 PROCEDURE — 99213 OFFICE O/P EST LOW 20 MIN: CPT | Performed by: NURSE PRACTITIONER

## 2019-04-12 NOTE — PROGRESS NOTES
Subjective: Follow-up        Chief Complaint:   Chief Complaint   Patient presents with   • Follow-up       HPI:    Zofia Ortiz is a 39 y.o. female here for follow-up of mild obstructive sleep apnea.  Patient was last seen 3/29/2019.  Patient had an elevated AHI at that time and was still feeling excessively sleepy upon awakening.  On 4/4/2019 a titration study was ordered and apnea periods were controlled at 11 cm H2O..  It was yesterday when this setting was initiated.  Patient is sleeping 6 hours a night and does not feel refreshed upon awakening.  Patient has an [place] score of 3/24.  Patient's biggest complaint is she does feel like she is smothering for the first 30 minutes to 1 hour after putting on her CPAP.  This did occur last night even after pressure was increased to 11 from 10.  She is unable to tolerate CPAP therapy.  Patient does wish to continue therapy for obstructive sleep apnea.    Current medications are:   Current Outpatient Medications:   •  B Complex Vitamins (VITAMIN B COMPLEX) capsule capsule, Take  by mouth Daily., Disp: , Rfl:   •  calcium citrate-vitamin d (CITRACAL) 200-250 MG-UNIT tablet tablet, Take  by mouth Daily., Disp: , Rfl:   •  Cranberry 1000 MG capsule, Take  by mouth., Disp: , Rfl:   •  folic acid (FOLVITE) 1 MG tablet, Take 1 mg by mouth Daily., Disp: , Rfl:   •  magnesium oxide (MAGOX) 400 (241.3 Mg) MG tablet tablet, Take 400 mg by mouth Daily., Disp: , Rfl:   •  Multiple Vitamins-Minerals (DAILY MULTIVITAMIN PO), Take  by mouth Daily., Disp: , Rfl:   •  omeprazole (PRILOSEC) 40 MG capsule, Take 1 capsule by mouth Daily., Disp: 30 capsule, Rfl: 11  •  potassium chloride (K-DUR) 10 MEQ CR tablet, Take 10 mEq by mouth 2 (Two) Times a Day., Disp: , Rfl:   •  senna (SENOKOT) 8.6 MG tablet tablet, Take  by mouth Every Night., Disp: , Rfl:   •  vitamin D (ERGOCALCIFEROL) 46497 units capsule capsule, Take 1 capsule by mouth 1 (One) Time Per Week., Disp: 12 capsule, Rfl:  0.      The patient's relevant past medical, surgical, family and social history were reviewed and updated in Epic as appropriate.       Review of Systems   Respiratory: Positive for apnea.    Psychiatric/Behavioral: Positive for sleep disturbance.   All other systems reviewed and are negative.        Objective:    Physical Exam   Constitutional: She is oriented to person, place, and time. She appears well-developed and well-nourished.   HENT:   Head: Normocephalic and atraumatic.   Mouth/Throat: Oropharynx is clear and moist.   Mallampati 2 anatomy   Eyes: Conjunctivae are normal.   Neck: Neck supple. No thyromegaly present.   Cardiovascular: Normal rate and regular rhythm.   Pulmonary/Chest: Effort normal and breath sounds normal.   Lymphadenopathy:     She has no cervical adenopathy.   Neurological: She is alert and oriented to person, place, and time.   Skin: Skin is warm.   Psychiatric: She has a normal mood and affect. Her behavior is normal. Judgment and thought content normal.   Nursing note and vitals reviewed.    71/90 days of use.  Greater than 4-hour use 58.9%.  90% pressure 11.0 cm H2O.  AHI of 8.1.  Download reviewed with patient.    ASSESSMENT/PLAN    Zofia was seen today for follow-up.    Diagnoses and all orders for this visit:    CHUY (obstructive sleep apnea)  -     CPAP Therapy            1. Counseled patient regarding multimodal approach with healthy nutrition, healthy sleep, regular physical activity, social activities, counseling, and medications. Encouraged to practice lateral sleep position. Avoid alcohol and sedatives close to bedtime.  2. Patient continues to feel she is suffocating with the CPAP mask.  She is also still having hypersomnia while wearing CPAP.  I have changed patient to BiPAP machine EPAP minimum 6 cm H2O.  EPAP max 15 cm H2O.  Maximum pressure support 8.  Minimum pressure pressure support 4.  I will see patient back in 10 weeks.  I have reviewed the results of my evaluation  and impression and discussed my recommendations in detail with the patient.      Signed by  Autumn Sullivan, APRN    April 12, 2019      CC: Morgan Arguello MD          No ref. provider found

## 2019-04-16 ENCOUNTER — TELEPHONE (OUTPATIENT)
Dept: SLEEP MEDICINE | Facility: HOSPITAL | Age: 39
End: 2019-04-16

## 2019-04-16 LAB
25(OH)D3+25(OH)D2 SERPL-MCNC: 63.1 NG/ML (ref 30–100)
ALBUMIN SERPL-MCNC: 4.7 G/DL (ref 3.5–5.5)
ALBUMIN/GLOB SERPL: 2.1 {RATIO} (ref 1.2–2.2)
ALP SERPL-CCNC: 56 IU/L (ref 39–117)
ALT SERPL-CCNC: 15 IU/L (ref 0–32)
AST SERPL-CCNC: 20 IU/L (ref 0–40)
BASOPHILS # BLD AUTO: 0.1 X10E3/UL (ref 0–0.2)
BASOPHILS NFR BLD AUTO: 1 %
BILIRUB SERPL-MCNC: 0.4 MG/DL (ref 0–1.2)
BUN SERPL-MCNC: 13 MG/DL (ref 6–20)
BUN/CREAT SERPL: 15 (ref 9–23)
CALCIUM SERPL-MCNC: 9.8 MG/DL (ref 8.7–10.2)
CHLORIDE SERPL-SCNC: 104 MMOL/L (ref 96–106)
CO2 SERPL-SCNC: 22 MMOL/L (ref 20–29)
CREAT SERPL-MCNC: 0.86 MG/DL (ref 0.57–1)
EOSINOPHIL # BLD AUTO: 0.3 X10E3/UL (ref 0–0.4)
EOSINOPHIL NFR BLD AUTO: 4 %
ERYTHROCYTE [DISTWIDTH] IN BLOOD BY AUTOMATED COUNT: 13.1 % (ref 12.3–15.4)
FERRITIN SERPL-MCNC: 80 NG/ML (ref 15–150)
FOLATE SERPL-MCNC: >20 NG/ML
GLOBULIN SER CALC-MCNC: 2.2 G/DL (ref 1.5–4.5)
GLUCOSE SERPL-MCNC: 98 MG/DL (ref 65–99)
HCT VFR BLD AUTO: 44.2 % (ref 34–46.6)
HGB BLD-MCNC: 14.6 G/DL (ref 11.1–15.9)
IMM GRANULOCYTES # BLD AUTO: 0 X10E3/UL (ref 0–0.1)
IMM GRANULOCYTES NFR BLD AUTO: 0 %
IRON SERPL-MCNC: 73 UG/DL (ref 27–159)
LYMPHOCYTES # BLD AUTO: 2.5 X10E3/UL (ref 0.7–3.1)
LYMPHOCYTES NFR BLD AUTO: 35 %
Lab: NORMAL
MAGNESIUM SERPL-MCNC: 2.2 MG/DL (ref 1.6–2.3)
MCH RBC QN AUTO: 31.5 PG (ref 26.6–33)
MCHC RBC AUTO-ENTMCNC: 33 G/DL (ref 31.5–35.7)
MCV RBC AUTO: 96 FL (ref 79–97)
METHYLMALONATE SERPL-SCNC: 90 NMOL/L (ref 0–378)
MONOCYTES # BLD AUTO: 0.4 X10E3/UL (ref 0.1–0.9)
MONOCYTES NFR BLD AUTO: 5 %
NEUTROPHILS # BLD AUTO: 3.9 X10E3/UL (ref 1.4–7)
NEUTROPHILS NFR BLD AUTO: 55 %
PLATELET # BLD AUTO: 366 X10E3/UL (ref 150–379)
POTASSIUM SERPL-SCNC: 5.2 MMOL/L (ref 3.5–5.2)
PREALB SERPL-MCNC: 26 MG/DL (ref 14–35)
PROT SERPL-MCNC: 6.9 G/DL (ref 6–8.5)
RBC # BLD AUTO: 4.63 X10E6/UL (ref 3.77–5.28)
SODIUM SERPL-SCNC: 140 MMOL/L (ref 134–144)
VIT B1 BLD-SCNC: 277 NMOL/L (ref 66.5–200)
VIT B12 SERPL-MCNC: 1168 PG/ML (ref 232–1245)
WBC # BLD AUTO: 7.2 X10E3/UL (ref 3.4–10.8)

## 2019-04-16 NOTE — TELEPHONE ENCOUNTER
Patient was order bipap on 4/12/19 but there is no documentation that she tried and failed cpap therapy. Could you addend the note?

## 2019-04-17 ENCOUNTER — APPOINTMENT (OUTPATIENT)
Dept: PREADMISSION TESTING | Facility: HOSPITAL | Age: 39
End: 2019-04-17

## 2019-04-17 VITALS — BODY MASS INDEX: 26.18 KG/M2 | WEIGHT: 162.92 LBS | HEIGHT: 66 IN

## 2019-04-17 LAB
BASOPHILS # BLD AUTO: 0.04 10*3/MM3 (ref 0–0.2)
BASOPHILS NFR BLD AUTO: 0.6 % (ref 0–1.5)
DEPRECATED RDW RBC AUTO: 42.7 FL (ref 37–54)
EOSINOPHIL # BLD AUTO: 0.33 10*3/MM3 (ref 0–0.4)
EOSINOPHIL NFR BLD AUTO: 4.6 % (ref 0.3–6.2)
ERYTHROCYTE [DISTWIDTH] IN BLOOD BY AUTOMATED COUNT: 12.3 % (ref 12.3–15.4)
HCT VFR BLD AUTO: 40.4 % (ref 34–46.6)
HGB BLD-MCNC: 13.6 G/DL (ref 12–15.9)
IMM GRANULOCYTES # BLD AUTO: 0.01 10*3/MM3 (ref 0–0.05)
IMM GRANULOCYTES NFR BLD AUTO: 0.1 % (ref 0–0.5)
LYMPHOCYTES # BLD AUTO: 2.68 10*3/MM3 (ref 0.7–3.1)
LYMPHOCYTES NFR BLD AUTO: 37.1 % (ref 19.6–45.3)
MCH RBC QN AUTO: 32.1 PG (ref 26.6–33)
MCHC RBC AUTO-ENTMCNC: 33.7 G/DL (ref 31.5–35.7)
MCV RBC AUTO: 95.3 FL (ref 79–97)
MONOCYTES # BLD AUTO: 0.35 10*3/MM3 (ref 0.1–0.9)
MONOCYTES NFR BLD AUTO: 4.8 % (ref 5–12)
NEUTROPHILS # BLD AUTO: 3.82 10*3/MM3 (ref 1.4–7)
NEUTROPHILS NFR BLD AUTO: 52.8 % (ref 42.7–76)
PLATELET # BLD AUTO: 306 10*3/MM3 (ref 140–450)
PMV BLD AUTO: 9.4 FL (ref 6–12)
RBC # BLD AUTO: 4.24 10*6/MM3 (ref 3.77–5.28)
WBC NRBC COR # BLD: 7.23 10*3/MM3 (ref 3.4–10.8)

## 2019-04-17 PROCEDURE — 36415 COLL VENOUS BLD VENIPUNCTURE: CPT

## 2019-04-17 PROCEDURE — 93010 ELECTROCARDIOGRAM REPORT: CPT | Performed by: INTERNAL MEDICINE

## 2019-04-17 PROCEDURE — 85025 COMPLETE CBC W/AUTO DIFF WBC: CPT | Performed by: PLASTIC SURGERY

## 2019-04-17 PROCEDURE — 93005 ELECTROCARDIOGRAM TRACING: CPT

## 2019-04-29 ENCOUNTER — ANESTHESIA EVENT (OUTPATIENT)
Dept: PERIOP | Facility: HOSPITAL | Age: 39
End: 2019-04-29

## 2019-04-29 RX ORDER — FAMOTIDINE 10 MG/ML
20 INJECTION, SOLUTION INTRAVENOUS ONCE
Status: CANCELLED | OUTPATIENT
Start: 2019-04-29 | End: 2019-04-29

## 2019-04-30 ENCOUNTER — ANESTHESIA (OUTPATIENT)
Dept: PERIOP | Facility: HOSPITAL | Age: 39
End: 2019-04-30

## 2019-04-30 ENCOUNTER — HOSPITAL ENCOUNTER (OUTPATIENT)
Facility: HOSPITAL | Age: 39
Discharge: HOME | End: 2019-05-01
Attending: PLASTIC SURGERY | Admitting: PLASTIC SURGERY

## 2019-04-30 DIAGNOSIS — N64.81 PTOSIS OF BREAST: ICD-10-CM

## 2019-04-30 LAB
B-HCG UR QL: NEGATIVE
INTERNAL NEGATIVE CONTROL: NORMAL
INTERNAL POSITIVE CONTROL: POSITIVE
Lab: NORMAL

## 2019-04-30 PROCEDURE — G0378 HOSPITAL OBSERVATION PER HR: HCPCS

## 2019-04-30 PROCEDURE — 25010000003 CEFAZOLIN IN DEXTROSE 2-4 GM/100ML-% SOLUTION: Performed by: PLASTIC SURGERY

## 2019-04-30 PROCEDURE — 25010000002 PROMETHAZINE PER 50 MG: Performed by: NURSE ANESTHETIST, CERTIFIED REGISTERED

## 2019-04-30 PROCEDURE — 88305 TISSUE EXAM BY PATHOLOGIST: CPT | Performed by: PLASTIC SURGERY

## 2019-04-30 PROCEDURE — 25010000002 BUPRENORPHINE PER 0.1 MG: Performed by: ANESTHESIOLOGY

## 2019-04-30 PROCEDURE — 25010000002 PROPOFOL 1000 MG/ML EMULSION: Performed by: NURSE ANESTHETIST, CERTIFIED REGISTERED

## 2019-04-30 PROCEDURE — 25010000002 HYDROMORPHONE PER 4 MG: Performed by: PLASTIC SURGERY

## 2019-04-30 PROCEDURE — 25010000002 PROPOFOL 10 MG/ML EMULSION: Performed by: NURSE ANESTHETIST, CERTIFIED REGISTERED

## 2019-04-30 PROCEDURE — 25010000002 HYDROMORPHONE PER 4 MG: Performed by: NURSE ANESTHETIST, CERTIFIED REGISTERED

## 2019-04-30 PROCEDURE — 25010000002 MIDAZOLAM PER 1 MG: Performed by: ANESTHESIOLOGY

## 2019-04-30 PROCEDURE — 25010000002 EPINEPHRINE PER 0.1 MG: Performed by: PLASTIC SURGERY

## 2019-04-30 PROCEDURE — 25010000002 FENTANYL CITRATE (PF) 100 MCG/2ML SOLUTION: Performed by: NURSE ANESTHETIST, CERTIFIED REGISTERED

## 2019-04-30 PROCEDURE — 25010000002 DEXAMETHASONE SODIUM PHOSPHATE 10 MG/ML SOLUTION: Performed by: ANESTHESIOLOGY

## 2019-04-30 PROCEDURE — 81025 URINE PREGNANCY TEST: CPT | Performed by: ANESTHESIOLOGY

## 2019-04-30 PROCEDURE — 25010000002 DEXAMETHASONE PER 1 MG: Performed by: NURSE ANESTHETIST, CERTIFIED REGISTERED

## 2019-04-30 PROCEDURE — 94799 UNLISTED PULMONARY SVC/PX: CPT

## 2019-04-30 RX ORDER — MIDAZOLAM HYDROCHLORIDE 1 MG/ML
2 INJECTION INTRAMUSCULAR; INTRAVENOUS ONCE
Status: COMPLETED | OUTPATIENT
Start: 2019-04-30 | End: 2019-04-30

## 2019-04-30 RX ORDER — PROMETHAZINE HYDROCHLORIDE 25 MG/1
25 SUPPOSITORY RECTAL ONCE AS NEEDED
Status: COMPLETED | OUTPATIENT
Start: 2019-04-30 | End: 2019-04-30

## 2019-04-30 RX ORDER — PROMETHAZINE HYDROCHLORIDE 12.5 MG/1
12.5 TABLET ORAL EVERY 6 HOURS PRN
Status: DISCONTINUED | OUTPATIENT
Start: 2019-04-30 | End: 2019-05-01 | Stop reason: HOSPADM

## 2019-04-30 RX ORDER — BUPIVACAINE HYDROCHLORIDE 2.5 MG/ML
INJECTION, SOLUTION EPIDURAL; INFILTRATION; INTRACAUDAL
Status: COMPLETED | OUTPATIENT
Start: 2019-04-30 | End: 2019-04-30

## 2019-04-30 RX ORDER — FAMOTIDINE 20 MG/1
20 TABLET, FILM COATED ORAL ONCE
Status: COMPLETED | OUTPATIENT
Start: 2019-04-30 | End: 2019-04-30

## 2019-04-30 RX ORDER — PROMETHAZINE HYDROCHLORIDE 25 MG/ML
6.25 INJECTION, SOLUTION INTRAMUSCULAR; INTRAVENOUS EVERY 6 HOURS PRN
Status: DISCONTINUED | OUTPATIENT
Start: 2019-04-30 | End: 2019-05-01 | Stop reason: HOSPADM

## 2019-04-30 RX ORDER — FENTANYL CITRATE 50 UG/ML
INJECTION, SOLUTION INTRAMUSCULAR; INTRAVENOUS AS NEEDED
Status: DISCONTINUED | OUTPATIENT
Start: 2019-04-30 | End: 2019-04-30 | Stop reason: SURG

## 2019-04-30 RX ORDER — SODIUM CHLORIDE, SODIUM LACTATE, POTASSIUM CHLORIDE, CALCIUM CHLORIDE 600; 310; 30; 20 MG/100ML; MG/100ML; MG/100ML; MG/100ML
9 INJECTION, SOLUTION INTRAVENOUS CONTINUOUS
Status: DISCONTINUED | OUTPATIENT
Start: 2019-04-30 | End: 2019-05-01 | Stop reason: HOSPADM

## 2019-04-30 RX ORDER — GLYCOPYRROLATE 0.2 MG/ML
INJECTION INTRAMUSCULAR; INTRAVENOUS AS NEEDED
Status: DISCONTINUED | OUTPATIENT
Start: 2019-04-30 | End: 2019-04-30 | Stop reason: SURG

## 2019-04-30 RX ORDER — SODIUM CHLORIDE 0.9 % (FLUSH) 0.9 %
3 SYRINGE (ML) INJECTION EVERY 12 HOURS SCHEDULED
Status: DISCONTINUED | OUTPATIENT
Start: 2019-04-30 | End: 2019-04-30 | Stop reason: HOSPADM

## 2019-04-30 RX ORDER — DEXAMETHASONE SODIUM PHOSPHATE 10 MG/ML
INJECTION, SOLUTION INTRAMUSCULAR; INTRAVENOUS
Status: COMPLETED | OUTPATIENT
Start: 2019-04-30 | End: 2019-04-30

## 2019-04-30 RX ORDER — SODIUM CHLORIDE, SODIUM LACTATE, POTASSIUM CHLORIDE, CALCIUM CHLORIDE 600; 310; 30; 20 MG/100ML; MG/100ML; MG/100ML; MG/100ML
75 INJECTION, SOLUTION INTRAVENOUS CONTINUOUS
Status: DISCONTINUED | OUTPATIENT
Start: 2019-04-30 | End: 2019-05-01 | Stop reason: HOSPADM

## 2019-04-30 RX ORDER — ALPRAZOLAM 0.5 MG/1
0.5 TABLET ORAL 3 TIMES DAILY PRN
Status: DISCONTINUED | OUTPATIENT
Start: 2019-04-30 | End: 2019-05-01 | Stop reason: HOSPADM

## 2019-04-30 RX ORDER — LIDOCAINE HYDROCHLORIDE 10 MG/ML
INJECTION, SOLUTION INFILTRATION; PERINEURAL AS NEEDED
Status: DISCONTINUED | OUTPATIENT
Start: 2019-04-30 | End: 2019-04-30 | Stop reason: SURG

## 2019-04-30 RX ORDER — CEFAZOLIN SODIUM 2 G/100ML
2 INJECTION, SOLUTION INTRAVENOUS ONCE
Status: COMPLETED | OUTPATIENT
Start: 2019-04-30 | End: 2019-04-30

## 2019-04-30 RX ORDER — ACETAMINOPHEN 500 MG
1000 TABLET ORAL EVERY 6 HOURS
Status: DISCONTINUED | OUTPATIENT
Start: 2019-04-30 | End: 2019-04-30

## 2019-04-30 RX ORDER — OXYCODONE HYDROCHLORIDE 5 MG/1
5 TABLET ORAL EVERY 4 HOURS PRN
Status: DISCONTINUED | OUTPATIENT
Start: 2019-04-30 | End: 2019-05-01 | Stop reason: HOSPADM

## 2019-04-30 RX ORDER — DEXAMETHASONE SODIUM PHOSPHATE 4 MG/ML
INJECTION, SOLUTION INTRA-ARTICULAR; INTRALESIONAL; INTRAMUSCULAR; INTRAVENOUS; SOFT TISSUE AS NEEDED
Status: DISCONTINUED | OUTPATIENT
Start: 2019-04-30 | End: 2019-04-30 | Stop reason: SURG

## 2019-04-30 RX ORDER — ACETAMINOPHEN 500 MG
1000 TABLET ORAL EVERY 6 HOURS SCHEDULED
Status: DISCONTINUED | OUTPATIENT
Start: 2019-04-30 | End: 2019-05-01 | Stop reason: HOSPADM

## 2019-04-30 RX ORDER — NALOXONE HCL 0.4 MG/ML
0.1 VIAL (ML) INJECTION
Status: DISCONTINUED | OUTPATIENT
Start: 2019-04-30 | End: 2019-05-01 | Stop reason: HOSPADM

## 2019-04-30 RX ORDER — BUPRENORPHINE HYDROCHLORIDE 0.32 MG/ML
INJECTION INTRAMUSCULAR; INTRAVENOUS
Status: COMPLETED | OUTPATIENT
Start: 2019-04-30 | End: 2019-04-30

## 2019-04-30 RX ORDER — ATRACURIUM BESYLATE 10 MG/ML
INJECTION, SOLUTION INTRAVENOUS AS NEEDED
Status: DISCONTINUED | OUTPATIENT
Start: 2019-04-30 | End: 2019-04-30 | Stop reason: SURG

## 2019-04-30 RX ORDER — NEOSTIGMINE METHYLSULFATE 5 MG/5 ML
SYRINGE (ML) INTRAVENOUS AS NEEDED
Status: DISCONTINUED | OUTPATIENT
Start: 2019-04-30 | End: 2019-04-30 | Stop reason: SURG

## 2019-04-30 RX ORDER — PROMETHAZINE HYDROCHLORIDE 25 MG/1
25 TABLET ORAL ONCE AS NEEDED
Status: COMPLETED | OUTPATIENT
Start: 2019-04-30 | End: 2019-04-30

## 2019-04-30 RX ORDER — HYDROMORPHONE HYDROCHLORIDE 1 MG/ML
0.5 INJECTION, SOLUTION INTRAMUSCULAR; INTRAVENOUS; SUBCUTANEOUS
Status: DISCONTINUED | OUTPATIENT
Start: 2019-04-30 | End: 2019-04-30 | Stop reason: HOSPADM

## 2019-04-30 RX ORDER — LIDOCAINE HYDROCHLORIDE 10 MG/ML
0.5 INJECTION, SOLUTION EPIDURAL; INFILTRATION; INTRACAUDAL; PERINEURAL ONCE AS NEEDED
Status: COMPLETED | OUTPATIENT
Start: 2019-04-30 | End: 2019-04-30

## 2019-04-30 RX ORDER — MAGNESIUM HYDROXIDE 1200 MG/15ML
LIQUID ORAL AS NEEDED
Status: DISCONTINUED | OUTPATIENT
Start: 2019-04-30 | End: 2019-04-30 | Stop reason: HOSPADM

## 2019-04-30 RX ORDER — PROMETHAZINE HYDROCHLORIDE 25 MG/ML
6.25 INJECTION, SOLUTION INTRAMUSCULAR; INTRAVENOUS ONCE AS NEEDED
Status: COMPLETED | OUTPATIENT
Start: 2019-04-30 | End: 2019-04-30

## 2019-04-30 RX ORDER — FENTANYL CITRATE 50 UG/ML
50 INJECTION, SOLUTION INTRAMUSCULAR; INTRAVENOUS
Status: DISCONTINUED | OUTPATIENT
Start: 2019-04-30 | End: 2019-04-30 | Stop reason: HOSPADM

## 2019-04-30 RX ORDER — MEPERIDINE HYDROCHLORIDE 25 MG/ML
12.5 INJECTION INTRAMUSCULAR; INTRAVENOUS; SUBCUTANEOUS
Status: DISCONTINUED | OUTPATIENT
Start: 2019-04-30 | End: 2019-04-30 | Stop reason: HOSPADM

## 2019-04-30 RX ORDER — PROPOFOL 10 MG/ML
VIAL (ML) INTRAVENOUS AS NEEDED
Status: DISCONTINUED | OUTPATIENT
Start: 2019-04-30 | End: 2019-04-30 | Stop reason: SURG

## 2019-04-30 RX ORDER — PANTOPRAZOLE SODIUM 40 MG/1
40 TABLET, DELAYED RELEASE ORAL EVERY MORNING
Status: DISCONTINUED | OUTPATIENT
Start: 2019-05-01 | End: 2019-05-01 | Stop reason: HOSPADM

## 2019-04-30 RX ORDER — HYDROMORPHONE HYDROCHLORIDE 1 MG/ML
0.5 INJECTION, SOLUTION INTRAMUSCULAR; INTRAVENOUS; SUBCUTANEOUS
Status: DISCONTINUED | OUTPATIENT
Start: 2019-04-30 | End: 2019-05-01 | Stop reason: HOSPADM

## 2019-04-30 RX ORDER — PROMETHAZINE HYDROCHLORIDE 12.5 MG/1
12.5 SUPPOSITORY RECTAL EVERY 6 HOURS PRN
Status: DISCONTINUED | OUTPATIENT
Start: 2019-04-30 | End: 2019-05-01 | Stop reason: HOSPADM

## 2019-04-30 RX ORDER — POTASSIUM CHLORIDE 750 MG/1
10 CAPSULE, EXTENDED RELEASE ORAL 2 TIMES DAILY WITH MEALS
Status: DISCONTINUED | OUTPATIENT
Start: 2019-04-30 | End: 2019-05-01 | Stop reason: HOSPADM

## 2019-04-30 RX ORDER — SODIUM CHLORIDE 0.9 % (FLUSH) 0.9 %
3-10 SYRINGE (ML) INJECTION AS NEEDED
Status: DISCONTINUED | OUTPATIENT
Start: 2019-04-30 | End: 2019-04-30 | Stop reason: HOSPADM

## 2019-04-30 RX ADMIN — Medication 3 MG: at 15:45

## 2019-04-30 RX ADMIN — BUPRENORPHINE HYDROCHLORIDE 0.15 MG: 0.32 INJECTION INTRAMUSCULAR; INTRAVENOUS at 07:56

## 2019-04-30 RX ADMIN — FAMOTIDINE 20 MG: 20 TABLET ORAL at 06:42

## 2019-04-30 RX ADMIN — CEFAZOLIN SODIUM 2 G: 2 INJECTION, SOLUTION INTRAVENOUS at 15:41

## 2019-04-30 RX ADMIN — HYDROMORPHONE HYDROCHLORIDE 0.5 MG: 1 INJECTION, SOLUTION INTRAMUSCULAR; INTRAVENOUS; SUBCUTANEOUS at 22:42

## 2019-04-30 RX ADMIN — GLYCOPYRROLATE 0.3 MG: 0.2 INJECTION, SOLUTION INTRAMUSCULAR; INTRAVENOUS at 15:45

## 2019-04-30 RX ADMIN — DEXAMETHASONE SODIUM PHOSPHATE 4 MG: 10 INJECTION INTRAMUSCULAR; INTRAVENOUS at 07:56

## 2019-04-30 RX ADMIN — LIDOCAINE HYDROCHLORIDE 50 MG: 10 INJECTION, SOLUTION INFILTRATION; PERINEURAL at 07:52

## 2019-04-30 RX ADMIN — HYDROMORPHONE HYDROCHLORIDE 0.5 MG: 1 INJECTION, SOLUTION INTRAMUSCULAR; INTRAVENOUS; SUBCUTANEOUS at 16:34

## 2019-04-30 RX ADMIN — PROPOFOL 50 MCG/KG/MIN: 10 INJECTION, EMULSION INTRAVENOUS at 07:58

## 2019-04-30 RX ADMIN — CEFAZOLIN SODIUM 2 G: 2 INJECTION, SOLUTION INTRAVENOUS at 09:46

## 2019-04-30 RX ADMIN — HYDROMORPHONE HYDROCHLORIDE 0.5 MG: 1 INJECTION, SOLUTION INTRAMUSCULAR; INTRAVENOUS; SUBCUTANEOUS at 19:07

## 2019-04-30 RX ADMIN — PROMETHAZINE HYDROCHLORIDE 6.25 MG: 25 INJECTION INTRAMUSCULAR; INTRAVENOUS at 17:16

## 2019-04-30 RX ADMIN — BUPIVACAINE HYDROCHLORIDE 30 ML: 2.5 INJECTION, SOLUTION EPIDURAL; INFILTRATION; INTRACAUDAL; PERINEURAL at 07:54

## 2019-04-30 RX ADMIN — CEFAZOLIN SODIUM 2 G: 2 INJECTION, SOLUTION INTRAVENOUS at 11:45

## 2019-04-30 RX ADMIN — BUPIVACAINE HYDROCHLORIDE 30 ML: 2.5 INJECTION, SOLUTION EPIDURAL; INFILTRATION; INTRACAUDAL; PERINEURAL at 07:56

## 2019-04-30 RX ADMIN — ATRACURIUM BESYLATE 20 MG: 10 INJECTION, SOLUTION INTRAVENOUS at 12:37

## 2019-04-30 RX ADMIN — SODIUM CHLORIDE, POTASSIUM CHLORIDE, SODIUM LACTATE AND CALCIUM CHLORIDE: 600; 310; 30; 20 INJECTION, SOLUTION INTRAVENOUS at 10:14

## 2019-04-30 RX ADMIN — ATRACURIUM BESYLATE 20 MG: 10 INJECTION, SOLUTION INTRAVENOUS at 12:51

## 2019-04-30 RX ADMIN — ATRACURIUM BESYLATE 50 MG: 10 INJECTION, SOLUTION INTRAVENOUS at 07:52

## 2019-04-30 RX ADMIN — CEFAZOLIN SODIUM 2 G: 2 INJECTION, SOLUTION INTRAVENOUS at 07:46

## 2019-04-30 RX ADMIN — FENTANYL CITRATE 50 MCG: 50 INJECTION, SOLUTION INTRAMUSCULAR; INTRAVENOUS at 16:26

## 2019-04-30 RX ADMIN — HYDROMORPHONE HYDROCHLORIDE 0.5 MG: 1 INJECTION, SOLUTION INTRAMUSCULAR; INTRAVENOUS; SUBCUTANEOUS at 16:46

## 2019-04-30 RX ADMIN — DEXAMETHASONE SODIUM PHOSPHATE 4 MG: 10 INJECTION INTRAMUSCULAR; INTRAVENOUS at 07:54

## 2019-04-30 RX ADMIN — OXYCODONE HYDROCHLORIDE 5 MG: 5 TABLET ORAL at 21:29

## 2019-04-30 RX ADMIN — DEXAMETHASONE SODIUM PHOSPHATE 4 MG: 4 INJECTION, SOLUTION INTRAMUSCULAR; INTRAVENOUS at 08:00

## 2019-04-30 RX ADMIN — PROPOFOL 200 MG: 10 INJECTION, EMULSION INTRAVENOUS at 07:52

## 2019-04-30 RX ADMIN — BUPRENORPHINE HYDROCHLORIDE 0.15 MG: 0.32 INJECTION INTRAMUSCULAR; INTRAVENOUS at 07:54

## 2019-04-30 RX ADMIN — POTASSIUM CHLORIDE 10 MEQ: 750 CAPSULE, EXTENDED RELEASE ORAL at 19:53

## 2019-04-30 RX ADMIN — LIDOCAINE HYDROCHLORIDE 0.5 ML: 10 INJECTION, SOLUTION EPIDURAL; INFILTRATION; INTRACAUDAL; PERINEURAL at 06:35

## 2019-04-30 RX ADMIN — SODIUM CHLORIDE, POTASSIUM CHLORIDE, SODIUM LACTATE AND CALCIUM CHLORIDE: 600; 310; 30; 20 INJECTION, SOLUTION INTRAVENOUS at 09:04

## 2019-04-30 RX ADMIN — ATRACURIUM BESYLATE 10 MG: 10 INJECTION, SOLUTION INTRAVENOUS at 10:29

## 2019-04-30 RX ADMIN — FENTANYL CITRATE 50 MCG: 50 INJECTION, SOLUTION INTRAMUSCULAR; INTRAVENOUS at 16:13

## 2019-04-30 RX ADMIN — SODIUM CHLORIDE, POTASSIUM CHLORIDE, SODIUM LACTATE AND CALCIUM CHLORIDE 9 ML/HR: 600; 310; 30; 20 INJECTION, SOLUTION INTRAVENOUS at 06:35

## 2019-04-30 RX ADMIN — SODIUM CHLORIDE, POTASSIUM CHLORIDE, SODIUM LACTATE AND CALCIUM CHLORIDE 75 ML/HR: 600; 310; 30; 20 INJECTION, SOLUTION INTRAVENOUS at 21:29

## 2019-04-30 RX ADMIN — SODIUM CHLORIDE, POTASSIUM CHLORIDE, SODIUM LACTATE AND CALCIUM CHLORIDE: 600; 310; 30; 20 INJECTION, SOLUTION INTRAVENOUS at 13:51

## 2019-04-30 RX ADMIN — ACETAMINOPHEN 1000 MG: 500 TABLET, FILM COATED ORAL at 19:53

## 2019-04-30 RX ADMIN — FENTANYL CITRATE 100 MCG: 50 INJECTION, SOLUTION INTRAMUSCULAR; INTRAVENOUS at 07:52

## 2019-04-30 RX ADMIN — SODIUM CHLORIDE, POTASSIUM CHLORIDE, SODIUM LACTATE AND CALCIUM CHLORIDE: 600; 310; 30; 20 INJECTION, SOLUTION INTRAVENOUS at 11:57

## 2019-04-30 RX ADMIN — MIDAZOLAM HYDROCHLORIDE 2 MG: 1 INJECTION, SOLUTION INTRAMUSCULAR; INTRAVENOUS at 06:52

## 2019-05-01 VITALS
HEART RATE: 50 BPM | OXYGEN SATURATION: 100 % | WEIGHT: 160 LBS | BODY MASS INDEX: 25.71 KG/M2 | SYSTOLIC BLOOD PRESSURE: 108 MMHG | DIASTOLIC BLOOD PRESSURE: 66 MMHG | HEIGHT: 66 IN | TEMPERATURE: 98.8 F | RESPIRATION RATE: 17 BRPM

## 2019-05-01 PROCEDURE — 25010000002 HYDROMORPHONE PER 4 MG: Performed by: PLASTIC SURGERY

## 2019-05-01 PROCEDURE — G0378 HOSPITAL OBSERVATION PER HR: HCPCS

## 2019-05-01 PROCEDURE — 25010000002 ENOXAPARIN PER 10 MG: Performed by: PLASTIC SURGERY

## 2019-05-01 RX ORDER — PROMETHAZINE HYDROCHLORIDE 12.5 MG/1
12.5 TABLET ORAL EVERY 6 HOURS PRN
Qty: 15 TABLET | Refills: 0 | OUTPATIENT
Start: 2019-05-01 | End: 2021-09-30

## 2019-05-01 RX ORDER — OXYCODONE HYDROCHLORIDE 5 MG/1
5 TABLET ORAL EVERY 4 HOURS PRN
Qty: 30 TABLET | Refills: 0 | Status: SHIPPED | OUTPATIENT
Start: 2019-05-01 | End: 2019-05-10

## 2019-05-01 RX ORDER — ACETAMINOPHEN 500 MG
1000 TABLET ORAL EVERY 6 HOURS SCHEDULED
Qty: 30 TABLET | Refills: 0 | Status: SHIPPED | OUTPATIENT
Start: 2019-05-01 | End: 2019-05-01

## 2019-05-01 RX ORDER — ACETAMINOPHEN 500 MG
1000 TABLET ORAL EVERY 6 HOURS SCHEDULED
Qty: 30 TABLET | Refills: 0 | Status: SHIPPED | OUTPATIENT
Start: 2019-05-01

## 2019-05-01 RX ORDER — PROMETHAZINE HYDROCHLORIDE 12.5 MG/1
12.5 TABLET ORAL EVERY 6 HOURS PRN
Qty: 15 TABLET | Refills: 0 | Status: SHIPPED | OUTPATIENT
Start: 2019-05-01 | End: 2019-05-01

## 2019-05-01 RX ADMIN — ALPRAZOLAM 0.5 MG: 0.5 TABLET ORAL at 01:04

## 2019-05-01 RX ADMIN — HYDROMORPHONE HYDROCHLORIDE 0.5 MG: 1 INJECTION, SOLUTION INTRAMUSCULAR; INTRAVENOUS; SUBCUTANEOUS at 10:06

## 2019-05-01 RX ADMIN — OXYCODONE HYDROCHLORIDE 5 MG: 5 TABLET ORAL at 04:23

## 2019-05-01 RX ADMIN — OXYCODONE HYDROCHLORIDE 5 MG: 5 TABLET ORAL at 08:03

## 2019-05-01 RX ADMIN — HYDROMORPHONE HYDROCHLORIDE 0.5 MG: 1 INJECTION, SOLUTION INTRAMUSCULAR; INTRAVENOUS; SUBCUTANEOUS at 07:31

## 2019-05-01 RX ADMIN — HYDROMORPHONE HYDROCHLORIDE 0.5 MG: 1 INJECTION, SOLUTION INTRAMUSCULAR; INTRAVENOUS; SUBCUTANEOUS at 00:34

## 2019-05-01 RX ADMIN — POTASSIUM CHLORIDE 10 MEQ: 750 CAPSULE, EXTENDED RELEASE ORAL at 08:03

## 2019-05-01 RX ADMIN — ACETAMINOPHEN 1000 MG: 500 TABLET, FILM COATED ORAL at 00:34

## 2019-05-01 RX ADMIN — ACETAMINOPHEN 1000 MG: 500 TABLET, FILM COATED ORAL at 12:04

## 2019-05-01 RX ADMIN — ALPRAZOLAM 0.5 MG: 0.5 TABLET ORAL at 06:42

## 2019-05-01 RX ADMIN — ENOXAPARIN SODIUM 40 MG: 40 INJECTION SUBCUTANEOUS at 08:03

## 2019-05-01 RX ADMIN — PANTOPRAZOLE SODIUM 40 MG: 40 TABLET, DELAYED RELEASE ORAL at 06:42

## 2019-05-01 RX ADMIN — ACETAMINOPHEN 1000 MG: 500 TABLET, FILM COATED ORAL at 06:42

## 2019-05-01 RX ADMIN — HYDROMORPHONE HYDROCHLORIDE 0.5 MG: 1 INJECTION, SOLUTION INTRAMUSCULAR; INTRAVENOUS; SUBCUTANEOUS at 12:04

## 2019-05-01 NOTE — PROGRESS NOTES
Plastic Surgery Progress Note     LOS: 0 days   Patient Care Team:  Morgan Arguello MD as PCP - General (Family Medicine)    Chief Complaint: POD#1 S/P abdominoiplasty    Subjective     Interval History:     Patient Complaints: None    Patient Denies:  Significant pain    History taken from: patient    Objective     Vital Signs  Temp:  [97.8 °F (36.6 °C)-98.9 °F (37.2 °C)] 98.8 °F (37.1 °C)  Heart Rate:  [] 50  Resp:  [15-17] 17  BP: (104-143)/(62-93) 108/66    Physical Exam:  Bilateral breasts - moderate swelling and ecchymosis, no hematoma, nipple areolas pink and viable  Abdomen - moderate swelling, no hematoma, umbilicus - pink and viable  Drains - serosanguinous drainage, more serous this am       Assessment/Plan       Ptosis of breast      Pt doing well. D/C home today after voiding well. F/U in office in one week.      Gisel-Mary Thurman MD  05/01/19  8:51 AM

## 2019-05-01 NOTE — PLAN OF CARE
Problem: Patient Care Overview  Goal: Plan of Care Review  Outcome: Ongoing (interventions implemented as appropriate)   05/01/19 1807   OTHER   Outcome Summary VSS, other than her baseline bradycardia, med x 3 with prn pain meds, dressings dry and intact, cpap use at night, ice to incisions, did sleep and calm down once she took the prn xanax, anderson to be d/c'd in am, will cont to monitor   Coping/Psychosocial   Plan of Care Reviewed With patient;spouse   Plan of Care Review   Progress improving     Goal: Individualization and Mutuality  Outcome: Ongoing (interventions implemented as appropriate)    Goal: Discharge Needs Assessment  Outcome: Ongoing (interventions implemented as appropriate)    Goal: Interprofessional Rounds/Family Conf  Outcome: Ongoing (interventions implemented as appropriate)      Problem: Pain, Acute (Adult)  Goal: Identify Related Risk Factors and Signs and Symptoms  Outcome: Ongoing (interventions implemented as appropriate)    Goal: Acceptable Pain Control/Comfort Level  Outcome: Ongoing (interventions implemented as appropriate)      Problem: Surgery Nonspecified (Adult)  Goal: Signs and Symptoms of Listed Potential Problems Will be Absent, Minimized or Managed (Surgery Nonspecified)  Outcome: Ongoing (interventions implemented as appropriate)    Goal: Anesthesia/Sedation Recovery  Outcome: Ongoing (interventions implemented as appropriate)

## 2019-05-01 NOTE — PROGRESS NOTES
Plastic Surgery Progress Note     LOS: 0 days   Patient Care Team:  Morgan Arguello MD as PCP - General (Family Medicine)    Chief Complaint:  S/P abdominoplasty    Subjective     Interval History:     Patient Complaints: pain between her breasts.   Patient Denies:  Pain in her abdomen  History taken from: patient    Objective     Vital Signs  Temp:  [97.8 °F (36.6 °C)-98.9 °F (37.2 °C)] 98.8 °F (37.1 °C)  Heart Rate:  [] 55  Resp:  [15-18] 16  BP: (112-143)/(65-93) 140/79    Physical Exam:  Bilateral breasts - soft with no evidence of hematoma, bialteral nipple areolar complexes - pink and viable  Abdomen - flat, umbilicus pink and viable  Drains - drainage - serosanguinous       Assessment/Plan       Ptosis of breast      Pt stable. Ice packs to bilateral breasts. Oxycodone for pain. D/C Mckenzie in AM. OOB to chair in AM. Start Lovenox in AM.      Abisai Thurman MD  04/30/19  9:37 PM

## 2019-05-03 LAB
CYTO UR: NORMAL
LAB AP CASE REPORT: NORMAL
LAB AP CLINICAL INFORMATION: NORMAL
PATH REPORT.FINAL DX SPEC: NORMAL
PATH REPORT.GROSS SPEC: NORMAL

## 2019-10-08 ENCOUNTER — OFFICE VISIT (OUTPATIENT)
Dept: BARIATRICS/WEIGHT MGMT | Facility: CLINIC | Age: 39
End: 2019-10-08

## 2019-10-08 VITALS
HEART RATE: 80 BPM | BODY MASS INDEX: 25.39 KG/M2 | OXYGEN SATURATION: 100 % | TEMPERATURE: 97.7 F | WEIGHT: 158 LBS | RESPIRATION RATE: 18 BRPM | HEIGHT: 66 IN | SYSTOLIC BLOOD PRESSURE: 134 MMHG | DIASTOLIC BLOOD PRESSURE: 82 MMHG

## 2019-10-08 DIAGNOSIS — K21.9 GASTROESOPHAGEAL REFLUX DISEASE, ESOPHAGITIS PRESENCE NOT SPECIFIED: Primary | ICD-10-CM

## 2019-10-08 PROCEDURE — 94690 O2 UPTK REST INDIRECT: CPT | Performed by: PHYSICIAN ASSISTANT

## 2019-10-08 PROCEDURE — 99213 OFFICE O/P EST LOW 20 MIN: CPT | Performed by: PHYSICIAN ASSISTANT

## 2019-10-08 NOTE — PROGRESS NOTES
"[facility] Bariatric Surgery  [address]  [facility] [address] [phone]      Patient Name:  Zofia Ortiz.  :  1980      Date of Visit: 10/8/2019      Reason for Visit:  routine eval - 19 months postop    HPI:  Zofia Ortiz is a 39 y.o. female s/p LSG by  on 3/14/18    Doing really well - very pleased.  Recovering from abdominoplasty 19 w/ Dr. Thurman.  Just able to start running last month for exercise.  Still unable to do core exercises.  Notes was recently able to gain 5 lbs w/ poor food choices.  Has since refocused.  Says \"I need to protect my investment.\"  Not currently tracking intake, but thinks she probably needs to.  Continues on daily Omeprazole, and some days still needs TUMS.  No other issues/concerns.  Labs 19 - WNL.  Taking MVI, Calcium, Vit D and Vit C daily.     Presurgery weight: 253 pounds.  Today's weight is 71.7 kg (158 lb) pounds, today's  Body mass index is 25.5 kg/m²., and her weight loss since surgery is 95 pounds.       Past Medical History:   Diagnosis Date   • Abnormal glucose    • Body piercing     EARS ONLY   • Dyspepsia     rare heartburn sx's, serum h. pyl neg   • Dyspnea on exertion    • Elevated BP     156/106 @ Intake, denies hx HTN.     • Elevated transaminase level    • Fatigue    • Former smoker     Vapes now   • GERD (gastroesophageal reflux disease)    • Morbid obesity (CMS/HCC)    •  (normal spontaneous vaginal delivery)     x 1 w/o complic   • Sleep apnea     newly dx, but suspects she has always had it   • Sleep apnea, obstructive 2018    recent sleep stuady change to bipap   • Tattoos     NECK, LEFT THIGH, RIGHT ANKLE   • Vitamin D deficiency      Past Surgical History:   Procedure Laterality Date   • ABDOMINOPLASTY N/A 2019    Procedure: ABDOMINOPLASTY;  Surgeon: Abisai Thurman MD;  Location: [facility];  Service: Plastics   •  SECTION WITH TUBAL  2007   • CYSTECTOMY      " ORAL   • ENDOSCOPY N/A 3/14/2018    Procedure: ESOPHAGOGASTRODUODENOSCOPY;  Surgeon: Glen Blackmon MD;  Location:  [facility];  Service: Bariatric   • GASTRIC SLEEVE LAPAROSCOPIC N/A 3/14/2018    Procedure: GASTRIC SLEEVE LAPAROSCOPIC;  Surgeon: Glen Blackmon MD;  Location:  [facility];  Service: Bariatric   • KNEE ARTHROSCOPY Right    • MASTOPEXY Bilateral 2019    Procedure: BREAST LIFT BILATERAL;  Surgeon: Abisai Thurman MD;  Location:  [facility];  Service: Plastics   • WISDOM TOOTH EXTRACTION      ALL FOUR     Outpatient Medications Marked as Taking for the 10/8/19 encounter (Office Visit) with Keisha Marquez PA   Medication Sig Dispense Refill   • acetaminophen (TYLENOL) 500 MG tablet Take 2 tablets by mouth Every 6 (Six) hours x2 days,  then tale 1-2 tabs every 6 hours as needed for pain. 30 tablet 0   • Calcium Carb-Cholecalciferol (CALCIUM 600 + D PO) Take  by mouth Daily.     • Cholecalciferol (VITAMIN D3) 5000 units capsule capsule Take 5,000 Units by mouth Daily.     • Cranberry 1000 MG capsule Take  by mouth Daily.     • folic acid (FOLVITE) 1 MG tablet Take 1 mg by mouth Daily.     • Multiple Vitamins-Minerals (DAILY MULTIVITAMIN PO) Take  by mouth Daily.     • omeprazole (PRILOSEC) 40 MG capsule Take 1 capsule by mouth Daily. 30 capsule 11   • PHENTERMINE HCL PO Take  by mouth. Patient reports taking phentermine but has stopped for surgery.     • senna (SENOKOT) 8.6 MG tablet tablet Take  by mouth Every Night.       No Known Allergies    Social History     Socioeconomic History   • Marital status:      Spouse name: Bruce Ortiz   • Number of children: 2   • Years of education: Bachelor's   • Highest education level: Not on file   Occupational History   • Occupation: Homemaker   Tobacco Use   • Smoking status: Former Smoker     Packs/day: 0.50     Years: 8.00     Pack years: 4.00     Types: Cigarettes     Last attempt to quit:      Years since quittin.7   •  "Smokeless tobacco: Never Used   Substance and Sexual Activity   • Alcohol use: Yes     Comment: Occasional   • Drug use: Yes     Types: Marijuana     Comment: 3-5 a month to sleep   • Sexual activity: Yes     Partners: Male     Comment:     Social History Narrative    Lives in [place], [place] w/  and 2 children.  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       /82 (BP Location: Left arm, Patient Position: Sitting, Cuff Size: Adult)   Pulse 80   Temp 97.7 °F (36.5 °C) (Temporal)   Resp 18   Ht 167.6 cm (66\")   Wt 71.7 kg (158 lb)   SpO2 100%   BMI 25.50 kg/m²   Physical Exam   Constitutional: She appears well-developed and well-nourished. She is cooperative.   HENT:   Mouth/Throat: Oropharynx is clear and moist and mucous membranes are normal.   Eyes: Conjunctivae are normal. No scleral icterus.   Cardiovascular: Normal rate.   Pulmonary/Chest: Effort normal.   Abdominal: Soft. There is no tenderness.   abdominoplasty incision well healed   Musculoskeletal: Normal range of motion. She exhibits no edema.   Neurological: She is alert.   Skin: Skin is warm and dry. No rash noted.   Psychiatric: She has a normal mood and affect. Judgment normal.         Assessment:   19 months s/p LSG by  on 3/14/18    ICD-10-CM ICD-9-CM   1. Gastroesophageal reflux disease, esophagitis presence not specified K21.9 530.81       Plan:  Continue w/ good food choices and healthy habits.  BMR today.  Advised to track intake, adjusting caloric intake according to BMR results.  Continue w/ routine exercise as able.  Continue Omeprazole daily - risks/benefits discussed.  Continue current vitamin regimen.  No labs needed today.  Call w/ issues/concerns.    The patient was instructed to follow up in 6 months, sooner if needed.     "

## 2019-12-31 RX ORDER — OMEPRAZOLE 40 MG/1
CAPSULE, DELAYED RELEASE ORAL
Qty: 30 CAPSULE | Refills: 11 | Status: SHIPPED | OUTPATIENT
Start: 2019-12-31 | End: 2020-04-08 | Stop reason: SDUPTHER

## 2020-04-08 ENCOUNTER — TELEMEDICINE (OUTPATIENT)
Dept: BARIATRICS/WEIGHT MGMT | Facility: CLINIC | Age: 40
End: 2020-04-08

## 2020-04-08 DIAGNOSIS — Z90.3 POSTGASTRECTOMY MALABSORPTION: ICD-10-CM

## 2020-04-08 DIAGNOSIS — K21.9 GASTROESOPHAGEAL REFLUX DISEASE, ESOPHAGITIS PRESENCE NOT SPECIFIED: ICD-10-CM

## 2020-04-08 DIAGNOSIS — Z13.21 MALNUTRITION SCREEN: ICD-10-CM

## 2020-04-08 DIAGNOSIS — R53.83 FATIGUE, UNSPECIFIED TYPE: ICD-10-CM

## 2020-04-08 DIAGNOSIS — Z13.0 SCREENING, IRON DEFICIENCY ANEMIA: ICD-10-CM

## 2020-04-08 DIAGNOSIS — K91.2 POSTGASTRECTOMY MALABSORPTION: ICD-10-CM

## 2020-04-08 DIAGNOSIS — Z98.84 STATUS POST BARIATRIC SURGERY: Primary | ICD-10-CM

## 2020-04-08 DIAGNOSIS — E55.9 HYPOVITAMINOSIS D: ICD-10-CM

## 2020-04-08 PROCEDURE — 99213 OFFICE O/P EST LOW 20 MIN: CPT | Performed by: SURGERY

## 2020-04-08 RX ORDER — OMEPRAZOLE 40 MG/1
40 CAPSULE, DELAYED RELEASE ORAL 2 TIMES DAILY
Qty: 180 CAPSULE | Refills: 3 | OUTPATIENT
Start: 2020-04-08 | End: 2021-09-30

## 2020-04-08 NOTE — PROGRESS NOTES
[facility] Bariatric Surgery  [address]  [address]  [facility] [address]  [phone]        Patient Name:  Zofia Ortiz.  :  1980      Date of Visit: 2020      Reason for Visit:   2 years postop      HPI: Zofia Ortiz is a 40 y.o. female s/p  LSG by  on 3/14/18    Abdominoplasty 2019.    Doing well.  No issues/concerns. Denies dysphagia, nausea, vomiting and abdominal pain.  If she doesn't take omprazole daily she has reflux.  It is mostly controlled, needs occasional PRN Tums.  Getting 50-60 g prot/day.  Drinking 64 fluid oz/day. Last labs revealed no deficiencies. Taking MVI and Calcium.  On Omeprazole .  Exercise: wrestling, jujitsu, jogging.     Presurgery weight: 253 pounds.  Today's weight is 160 pounds, today's  There is no height or weight on file to calculate BMI., and weight loss since surgery is 93 pounds.      Past Medical History:   Diagnosis Date   • Abnormal glucose    • Body piercing     EARS ONLY   • Dyspepsia     rare heartburn sx's, serum h. pyl neg   • Dyspnea on exertion    • Elevated BP     156/106 @ Intake, denies hx HTN.     • Elevated transaminase level    • Fatigue    • Former smoker     Vapes now   • GERD (gastroesophageal reflux disease)    • Morbid obesity (CMS/HCC)    •  (normal spontaneous vaginal delivery)     x 1 w/o complic   • Sleep apnea     newly dx, but suspects she has always had it   • Sleep apnea, obstructive 2018    recent sleep stuady change to bipap   • Tattoos     NECK, LEFT THIGH, RIGHT ANKLE   • Vitamin D deficiency      Past Surgical History:   Procedure Laterality Date   • ABDOMINOPLASTY N/A 2019    Procedure: ABDOMINOPLASTY;  Surgeon: Abisai Thurman MD;  [facility]  Service: Plastics   •  SECTION WITH TUBAL  2007   • CYSTECTOMY      ORAL   • ENDOSCOPY N/A 3/14/2018    Procedure: ESOPHAGOGASTRODUODENOSCOPY;  Surgeon: Glen Blackmon MD;  Location: [facility] OR;  Service:  Bariatric   • GASTRIC SLEEVE LAPAROSCOPIC N/A 3/14/2018    Procedure: GASTRIC SLEEVE LAPAROSCOPIC;  Surgeon: Glen Blackmon MD;  Location: [facility] OR;  Service: Bariatric   • KNEE ARTHROSCOPY Right    • MASTOPEXY Bilateral 2019    Procedure: BREAST LIFT BILATERAL;  Surgeon: Abisai Thumran MD;  Location: [facility];  Service: Plastics   • WISDOM TOOTH EXTRACTION      ALL FOUR     No outpatient medications have been marked as taking for the 20 encounter (Telemedicine) with Isabell Montana MD.       No Known Allergies    Social History     Socioeconomic History   • Marital status:      Spouse name: Bruce Ortiz   • Number of children: 2   • Years of education: Bachelor's   • Highest education level: Not on file   Occupational History   • Occupation: Homemaker   Tobacco Use   • Smoking status: Former Smoker     Packs/day: 0.50     Years: 8.00     Pack years: 4.00     Types: Cigarettes     Last attempt to quit:      Years since quittin.2   • Smokeless tobacco: Never Used   Substance and Sexual Activity   • Alcohol use: Yes     Comment: Occasional   • Drug use: Yes     Types: Marijuana     Comment: 3-5 a month to sleep   • Sexual activity: Yes     Partners: Male     Comment:     Social History Narrative    Lives in [place], [place] w/  and 2 children.  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       There were no vitals taken for this visit.    Physical Exam   Constitutional: She appears well-developed and well-nourished. No distress.   HENT:   Head: Normocephalic and atraumatic.   Mouth/Throat: No oropharyngeal exudate.   Eyes: Pupils are equal, round, and reactive to light. EOM are normal. No scleral icterus.   Skin: She is not diaphoretic.         Assessment:  2 years s/p  LSG by  on 3/14/18      ICD-10-CM ICD-9-CM   1. Status post bariatric surgery Z98.84 V45.86   2. Gastroesophageal reflux disease, esophagitis presence not specified K21.9 530.81   3. Fatigue, unspecified  type R53.83 780.79   4. Hypovitaminosis D E55.9 268.9   5. Postgastrectomy malabsorption K91.2 579.3    Z90.3    6. Malnutrition screen Z13.21 V77.2   7. Screening, iron deficiency anemia Z13.0 V78.0         Plan:  Doing well. Continue w/ good food choices and healthy habits.  Continue protein >70g/day.  Continue routine exercise.  Routine bariatric labs to be ordered 2 months from now when social distancing restrictions have lifted.  Continue vitamins w/ adjustments pending lab results.  Call w/ problems/concerns.     The patient was instructed to follow up in 1 year, sooner if needed.    This visit was conducted as a video visit, in an effort to limit spread of the novel coronavirus during the 2020 pandemic.          Isabell Montana MD

## 2021-09-29 ENCOUNTER — APPOINTMENT (OUTPATIENT)
Dept: CT IMAGING | Facility: HOSPITAL | Age: 41
End: 2021-09-29

## 2021-09-29 ENCOUNTER — HOSPITAL ENCOUNTER (EMERGENCY)
Facility: HOSPITAL | Age: 41
Discharge: HOME | End: 2021-09-30
Attending: EMERGENCY MEDICINE | Admitting: EMERGENCY MEDICINE

## 2021-09-29 ENCOUNTER — APPOINTMENT (OUTPATIENT)
Dept: MRI IMAGING | Facility: HOSPITAL | Age: 41
End: 2021-09-29

## 2021-09-29 DIAGNOSIS — Z98.84 HISTORY OF GASTRIC RESTRICTIVE SURGERY: ICD-10-CM

## 2021-09-29 DIAGNOSIS — M51.26 LUMBAR HERNIATED DISC: ICD-10-CM

## 2021-09-29 DIAGNOSIS — M54.10 RADICULAR LEG PAIN: ICD-10-CM

## 2021-09-29 DIAGNOSIS — M54.42 ACUTE LEFT-SIDED LOW BACK PAIN WITH LEFT-SIDED SCIATICA: Primary | ICD-10-CM

## 2021-09-29 DIAGNOSIS — E87.6 HYPOKALEMIA: ICD-10-CM

## 2021-09-29 LAB
ALBUMIN SERPL-MCNC: 4.6 G/DL (ref 3.5–5.2)
ALBUMIN/GLOB SERPL: 1.8 G/DL
ALP SERPL-CCNC: 69 U/L (ref 39–117)
ALT SERPL W P-5'-P-CCNC: 28 U/L (ref 1–33)
ANION GAP SERPL CALCULATED.3IONS-SCNC: 19 MMOL/L (ref 5–15)
AST SERPL-CCNC: 37 U/L (ref 1–32)
B-HCG UR QL: NEGATIVE
BASOPHILS # BLD AUTO: 0.06 10*3/MM3 (ref 0–0.2)
BASOPHILS NFR BLD AUTO: 0.8 % (ref 0–1.5)
BILIRUB SERPL-MCNC: 0.4 MG/DL (ref 0–1.2)
BILIRUB UR QL STRIP: NEGATIVE
BUN SERPL-MCNC: 9 MG/DL (ref 6–20)
BUN/CREAT SERPL: 12.2 (ref 7–25)
CALCIUM SPEC-SCNC: 9.2 MG/DL (ref 8.6–10.5)
CHLORIDE SERPL-SCNC: 96 MMOL/L (ref 98–107)
CLARITY UR: CLEAR
CO2 SERPL-SCNC: 21 MMOL/L (ref 22–29)
COLOR UR: YELLOW
CREAT SERPL-MCNC: 0.74 MG/DL (ref 0.57–1)
D-LACTATE SERPL-SCNC: 1.2 MMOL/L (ref 0.5–2)
D-LACTATE SERPL-SCNC: 4.8 MMOL/L (ref 0.5–2)
DEPRECATED RDW RBC AUTO: 40.8 FL (ref 37–54)
EOSINOPHIL # BLD AUTO: 0.16 10*3/MM3 (ref 0–0.4)
EOSINOPHIL NFR BLD AUTO: 2.1 % (ref 0.3–6.2)
ERYTHROCYTE [DISTWIDTH] IN BLOOD BY AUTOMATED COUNT: 11.5 % (ref 12.3–15.4)
GFR SERPL CREATININE-BSD FRML MDRD: 86 ML/MIN/1.73
GLOBULIN UR ELPH-MCNC: 2.6 GM/DL
GLUCOSE SERPL-MCNC: 96 MG/DL (ref 65–99)
GLUCOSE UR STRIP-MCNC: NEGATIVE MG/DL
HCT VFR BLD AUTO: 37.9 % (ref 34–46.6)
HGB BLD-MCNC: 13.2 G/DL (ref 12–15.9)
HGB UR QL STRIP.AUTO: NEGATIVE
HOLD SPECIMEN: NORMAL
IMM GRANULOCYTES # BLD AUTO: 0.01 10*3/MM3 (ref 0–0.05)
IMM GRANULOCYTES NFR BLD AUTO: 0.1 % (ref 0–0.5)
INTERNAL NEGATIVE CONTROL: NEGATIVE
INTERNAL POSITIVE CONTROL: POSITIVE
KETONES UR QL STRIP: ABNORMAL
LEUKOCYTE ESTERASE UR QL STRIP.AUTO: NEGATIVE
LIPASE SERPL-CCNC: 28 U/L (ref 13–60)
LYMPHOCYTES # BLD AUTO: 1.74 10*3/MM3 (ref 0.7–3.1)
LYMPHOCYTES NFR BLD AUTO: 22.4 % (ref 19.6–45.3)
Lab: NORMAL
MAGNESIUM SERPL-MCNC: 1.8 MG/DL (ref 1.6–2.6)
MCH RBC QN AUTO: 33.9 PG (ref 26.6–33)
MCHC RBC AUTO-ENTMCNC: 34.8 G/DL (ref 31.5–35.7)
MCV RBC AUTO: 97.4 FL (ref 79–97)
MONOCYTES # BLD AUTO: 0.65 10*3/MM3 (ref 0.1–0.9)
MONOCYTES NFR BLD AUTO: 8.4 % (ref 5–12)
NEUTROPHILS NFR BLD AUTO: 5.14 10*3/MM3 (ref 1.7–7)
NEUTROPHILS NFR BLD AUTO: 66.2 % (ref 42.7–76)
NITRITE UR QL STRIP: NEGATIVE
NRBC BLD AUTO-RTO: 0 /100 WBC (ref 0–0.2)
PH UR STRIP.AUTO: 7 [PH] (ref 5–8)
PLATELET # BLD AUTO: 216 10*3/MM3 (ref 140–450)
PMV BLD AUTO: 8.7 FL (ref 6–12)
POTASSIUM SERPL-SCNC: 2.9 MMOL/L (ref 3.5–5.2)
PROT SERPL-MCNC: 7.2 G/DL (ref 6–8.5)
PROT UR QL STRIP: NEGATIVE
RBC # BLD AUTO: 3.89 10*6/MM3 (ref 3.77–5.28)
SODIUM SERPL-SCNC: 136 MMOL/L (ref 136–145)
SP GR UR STRIP: 1.01 (ref 1–1.03)
UROBILINOGEN UR QL STRIP: ABNORMAL
WBC # BLD AUTO: 7.76 10*3/MM3 (ref 3.4–10.8)
WHOLE BLOOD HOLD SPECIMEN: NORMAL
WHOLE BLOOD HOLD SPECIMEN: NORMAL

## 2021-09-29 PROCEDURE — 74177 CT ABD & PELVIS W/CONTRAST: CPT

## 2021-09-29 PROCEDURE — 25010000002 ONDANSETRON PER 1 MG: Performed by: EMERGENCY MEDICINE

## 2021-09-29 PROCEDURE — 83605 ASSAY OF LACTIC ACID: CPT | Performed by: EMERGENCY MEDICINE

## 2021-09-29 PROCEDURE — P9612 CATHETERIZE FOR URINE SPEC: HCPCS

## 2021-09-29 PROCEDURE — 81003 URINALYSIS AUTO W/O SCOPE: CPT | Performed by: EMERGENCY MEDICINE

## 2021-09-29 PROCEDURE — 96375 TX/PRO/DX INJ NEW DRUG ADDON: CPT

## 2021-09-29 PROCEDURE — 25010000002 DIAZEPAM PER 5 MG: Performed by: EMERGENCY MEDICINE

## 2021-09-29 PROCEDURE — 25010000002 KETOROLAC TROMETHAMINE PER 15 MG: Performed by: PHYSICIAN ASSISTANT

## 2021-09-29 PROCEDURE — 96374 THER/PROPH/DIAG INJ IV PUSH: CPT

## 2021-09-29 PROCEDURE — 25010000002 HYDROMORPHONE 1 MG/ML SOLUTION: Performed by: EMERGENCY MEDICINE

## 2021-09-29 PROCEDURE — 81025 URINE PREGNANCY TEST: CPT | Performed by: EMERGENCY MEDICINE

## 2021-09-29 PROCEDURE — 72131 CT LUMBAR SPINE W/O DYE: CPT

## 2021-09-29 PROCEDURE — 80053 COMPREHEN METABOLIC PANEL: CPT | Performed by: EMERGENCY MEDICINE

## 2021-09-29 PROCEDURE — 85025 COMPLETE CBC W/AUTO DIFF WBC: CPT | Performed by: EMERGENCY MEDICINE

## 2021-09-29 PROCEDURE — 25010000002 DEXAMETHASONE PER 1 MG: Performed by: PHYSICIAN ASSISTANT

## 2021-09-29 PROCEDURE — 83690 ASSAY OF LIPASE: CPT | Performed by: EMERGENCY MEDICINE

## 2021-09-29 PROCEDURE — 25010000002 IOPAMIDOL 61 % SOLUTION: Performed by: EMERGENCY MEDICINE

## 2021-09-29 PROCEDURE — 83735 ASSAY OF MAGNESIUM: CPT | Performed by: EMERGENCY MEDICINE

## 2021-09-29 PROCEDURE — 99284 EMERGENCY DEPT VISIT MOD MDM: CPT

## 2021-09-29 RX ORDER — DIAZEPAM 5 MG/ML
5 INJECTION, SOLUTION INTRAMUSCULAR; INTRAVENOUS ONCE
Status: COMPLETED | OUTPATIENT
Start: 2021-09-29 | End: 2021-09-29

## 2021-09-29 RX ORDER — SODIUM CHLORIDE 9 MG/ML
10 INJECTION INTRAVENOUS AS NEEDED
Status: DISCONTINUED | OUTPATIENT
Start: 2021-09-29 | End: 2021-09-30 | Stop reason: HOSPADM

## 2021-09-29 RX ORDER — ONDANSETRON 2 MG/ML
4 INJECTION INTRAMUSCULAR; INTRAVENOUS ONCE
Status: COMPLETED | OUTPATIENT
Start: 2021-09-29 | End: 2021-09-29

## 2021-09-29 RX ORDER — KETOROLAC TROMETHAMINE 30 MG/ML
30 INJECTION, SOLUTION INTRAMUSCULAR; INTRAVENOUS ONCE
Status: COMPLETED | OUTPATIENT
Start: 2021-09-29 | End: 2021-09-29

## 2021-09-29 RX ORDER — POTASSIUM CHLORIDE 750 MG/1
40 CAPSULE, EXTENDED RELEASE ORAL ONCE
Status: COMPLETED | OUTPATIENT
Start: 2021-09-29 | End: 2021-09-29

## 2021-09-29 RX ORDER — DEXAMETHASONE SODIUM PHOSPHATE 4 MG/ML
8 INJECTION, SOLUTION INTRA-ARTICULAR; INTRALESIONAL; INTRAMUSCULAR; INTRAVENOUS; SOFT TISSUE ONCE
Status: COMPLETED | OUTPATIENT
Start: 2021-09-29 | End: 2021-09-29

## 2021-09-29 RX ADMIN — SODIUM CHLORIDE 1000 ML: 9 INJECTION, SOLUTION INTRAVENOUS at 19:57

## 2021-09-29 RX ADMIN — POTASSIUM CHLORIDE 40 MEQ: 750 CAPSULE, EXTENDED RELEASE ORAL at 21:26

## 2021-09-29 RX ADMIN — KETOROLAC TROMETHAMINE 30 MG: 30 INJECTION, SOLUTION INTRAMUSCULAR; INTRAVENOUS at 21:26

## 2021-09-29 RX ADMIN — DIAZEPAM 5 MG: 5 INJECTION, SOLUTION INTRAMUSCULAR; INTRAVENOUS at 22:47

## 2021-09-29 RX ADMIN — ONDANSETRON HYDROCHLORIDE 4 MG: 2 INJECTION, SOLUTION INTRAMUSCULAR; INTRAVENOUS at 19:56

## 2021-09-29 RX ADMIN — DEXAMETHASONE SODIUM PHOSPHATE 8 MG: 4 INJECTION, SOLUTION INTRAMUSCULAR; INTRAVENOUS at 21:27

## 2021-09-29 RX ADMIN — SODIUM CHLORIDE 1000 ML: 9 INJECTION, SOLUTION INTRAVENOUS at 21:27

## 2021-09-29 RX ADMIN — IOPAMIDOL 85 ML: 612 INJECTION, SOLUTION INTRAVENOUS at 20:23

## 2021-09-29 RX ADMIN — HYDROMORPHONE HYDROCHLORIDE 1 MG: 1 INJECTION, SOLUTION INTRAMUSCULAR; INTRAVENOUS; SUBCUTANEOUS at 19:56

## 2021-09-30 ENCOUNTER — APPOINTMENT (OUTPATIENT)
Dept: MRI IMAGING | Facility: HOSPITAL | Age: 41
End: 2021-09-30

## 2021-09-30 VITALS
DIASTOLIC BLOOD PRESSURE: 93 MMHG | BODY MASS INDEX: 26.52 KG/M2 | OXYGEN SATURATION: 98 % | HEART RATE: 69 BPM | SYSTOLIC BLOOD PRESSURE: 130 MMHG | TEMPERATURE: 97.6 F | HEIGHT: 66 IN | WEIGHT: 165 LBS | RESPIRATION RATE: 18 BRPM

## 2021-09-30 PROCEDURE — 25010000002 HYDROMORPHONE PER 4 MG: Performed by: EMERGENCY MEDICINE

## 2021-09-30 PROCEDURE — 25010000002 ONDANSETRON PER 1 MG: Performed by: EMERGENCY MEDICINE

## 2021-09-30 PROCEDURE — 96376 TX/PRO/DX INJ SAME DRUG ADON: CPT

## 2021-09-30 PROCEDURE — 72148 MRI LUMBAR SPINE W/O DYE: CPT

## 2021-09-30 RX ORDER — HYDROMORPHONE HYDROCHLORIDE 1 MG/ML
0.5 INJECTION, SOLUTION INTRAMUSCULAR; INTRAVENOUS; SUBCUTANEOUS ONCE
Status: COMPLETED | OUTPATIENT
Start: 2021-09-30 | End: 2021-09-30

## 2021-09-30 RX ORDER — ONDANSETRON 2 MG/ML
4 INJECTION INTRAMUSCULAR; INTRAVENOUS ONCE
Status: COMPLETED | OUTPATIENT
Start: 2021-09-30 | End: 2021-09-30

## 2021-09-30 RX ORDER — METAXALONE 800 MG/1
800 TABLET ORAL 3 TIMES DAILY
Status: DISCONTINUED | OUTPATIENT
Start: 2021-09-30 | End: 2021-09-30 | Stop reason: HOSPADM

## 2021-09-30 RX ORDER — METAXALONE 800 MG/1
800 TABLET ORAL 3 TIMES DAILY PRN
Qty: 21 TABLET | Refills: 0 | OUTPATIENT
Start: 2021-09-30 | End: 2023-02-03

## 2021-09-30 RX ORDER — METHYLPREDNISOLONE 4 MG/1
TABLET ORAL
Qty: 21 TABLET | Refills: 0 | Status: SHIPPED | OUTPATIENT
Start: 2021-09-30

## 2021-09-30 RX ORDER — OXYCODONE HYDROCHLORIDE AND ACETAMINOPHEN 5; 325 MG/1; MG/1
1 TABLET ORAL EVERY 4 HOURS PRN
Qty: 8 TABLET | Refills: 0 | OUTPATIENT
Start: 2021-09-30 | End: 2023-02-03

## 2021-09-30 RX ORDER — OXYCODONE HYDROCHLORIDE AND ACETAMINOPHEN 5; 325 MG/1; MG/1
1 TABLET ORAL EVERY 4 HOURS PRN
Qty: 12 TABLET | Refills: 0 | Status: SHIPPED | OUTPATIENT
Start: 2021-09-30 | End: 2021-09-30 | Stop reason: SDUPTHER

## 2021-09-30 RX ORDER — METAXALONE 800 MG/1
800 TABLET ORAL 3 TIMES DAILY PRN
Qty: 21 TABLET | Refills: 0 | Status: SHIPPED | OUTPATIENT
Start: 2021-09-30 | End: 2021-09-30 | Stop reason: SDUPTHER

## 2021-09-30 RX ORDER — METHYLPREDNISOLONE 4 MG/1
TABLET ORAL
Qty: 21 TABLET | Refills: 0 | Status: SHIPPED | OUTPATIENT
Start: 2021-09-30 | End: 2021-09-30 | Stop reason: SDUPTHER

## 2021-09-30 RX ADMIN — METAXALONE 800 MG: 800 TABLET ORAL at 02:32

## 2021-09-30 RX ADMIN — ONDANSETRON HYDROCHLORIDE 4 MG: 2 INJECTION, SOLUTION INTRAMUSCULAR; INTRAVENOUS at 00:41

## 2021-09-30 RX ADMIN — HYDROMORPHONE HYDROCHLORIDE 0.5 MG: 1 INJECTION, SOLUTION INTRAMUSCULAR; INTRAVENOUS; SUBCUTANEOUS at 00:41

## 2021-10-01 RX ORDER — OMEPRAZOLE 40 MG/1
CAPSULE, DELAYED RELEASE ORAL
Qty: 60 CAPSULE | OUTPATIENT
Start: 2021-10-01

## 2021-10-18 ENCOUNTER — OFFICE VISIT (OUTPATIENT)
Dept: NEUROSURGERY | Facility: CLINIC | Age: 41
End: 2021-10-18

## 2021-10-18 VITALS — RESPIRATION RATE: 18 BRPM | BODY MASS INDEX: 28.35 KG/M2 | HEIGHT: 66 IN | WEIGHT: 176.4 LBS

## 2021-10-18 DIAGNOSIS — G89.29 HIP PAIN, CHRONIC, LEFT: ICD-10-CM

## 2021-10-18 DIAGNOSIS — M25.552 HIP PAIN, CHRONIC, LEFT: ICD-10-CM

## 2021-10-18 DIAGNOSIS — M54.42 CHRONIC LEFT-SIDED LOW BACK PAIN WITH LEFT-SIDED SCIATICA: Primary | ICD-10-CM

## 2021-10-18 DIAGNOSIS — G89.29 CHRONIC LEFT-SIDED LOW BACK PAIN WITH LEFT-SIDED SCIATICA: Primary | ICD-10-CM

## 2021-10-18 PROCEDURE — 99204 OFFICE O/P NEW MOD 45 MIN: CPT | Performed by: NEUROLOGICAL SURGERY

## 2021-10-18 NOTE — PROGRESS NOTES
Subjective     Chief Complaint: Back pain    Patient ID: Zofia Ortiz is a 41 y.o. female seen for consultation today at the request of  Morgan Arguello, *    History of Present Illness    This is a 41-year-old woman who presents to my office with chief complaints of an approximately 4-5-week history of left-sided low back pain.  She does have pain that radiates into her left buttock.  She also has numbness which extends into her left leg.  Her medical comorbidities are significant for grade 1 obesity.  She is in nursing school and enjoys doing [employer] as a hobby.    The following portions of the patient's history were reviewed and updated as appropriate: allergies, current medications, past family history, past medical history, past social history, past surgical history and problem list.    Family history:   Family History   Problem Relation Age of Onset   • Hypertension Mother    • No Known Problems Father    • No Known Problems Sister    • Obesity Maternal Grandmother    • Hypertension Maternal Grandmother    • Diabetes Maternal Grandmother    • Melanoma Maternal Grandfather        Social history:   Social History     Socioeconomic History   • Marital status:      Spouse name: Bruce Ortiz   • Number of children: 2   • Years of education: Bachelor's   Tobacco Use   • Smoking status: Former Smoker     Packs/day: 0.50     Years: 8.00     Pack years: 4.00     Types: Cigarettes     Quit date:      Years since quittin.8   • Smokeless tobacco: Never Used   Vaping Use   • Vaping Use: Never used   Substance and Sexual Activity   • Alcohol use: Yes     Comment: Occasional   • Drug use: Yes     Types: Marijuana     Comment: 3-5 a month to sleep   • Sexual activity: Yes     Partners: Male     Comment:         Review of Systems   Constitutional: Positive for chills and fatigue. Negative for activity change, appetite change, diaphoresis, fever and unexpected weight change.   HENT: Negative for  "congestion, dental problem, drooling, ear discharge, ear pain, facial swelling, hearing loss, mouth sores, nosebleeds, postnasal drip, rhinorrhea, sinus pressure, sinus pain, sneezing, sore throat, tinnitus, trouble swallowing and voice change.    Eyes: Negative for photophobia, pain, discharge, redness, itching and visual disturbance.   Respiratory: Negative for apnea, cough, choking, chest tightness, shortness of breath, wheezing and stridor.    Cardiovascular: Negative for chest pain, palpitations and leg swelling.   Gastrointestinal: Positive for constipation. Negative for abdominal distention, abdominal pain, anal bleeding, blood in stool, diarrhea, nausea, rectal pain and vomiting.   Endocrine: Negative for cold intolerance, heat intolerance, polydipsia, polyphagia and polyuria.   Genitourinary: Positive for flank pain, frequency and urgency. Negative for decreased urine volume, difficulty urinating, dysuria, enuresis, genital sores and hematuria.   Musculoskeletal: Positive for back pain and gait problem. Negative for arthralgias, joint swelling, myalgias, neck pain and neck stiffness.   Skin: Negative for color change, pallor, rash and wound.   Allergic/Immunologic: Negative for environmental allergies, food allergies and immunocompromised state.   Neurological: Positive for dizziness and numbness. Negative for tremors, seizures, syncope, facial asymmetry, speech difficulty, weakness, light-headedness and headaches.   Hematological: Negative for adenopathy. Bruises/bleeds easily.   Psychiatric/Behavioral: Negative for agitation, behavioral problems, confusion, decreased concentration, dysphoric mood, hallucinations, self-injury, sleep disturbance and suicidal ideas. The patient is not nervous/anxious and is not hyperactive.        Objective   Resp. rate 18, height 167.6 cm (66\"), weight 80 kg (176 lb 6.4 oz), not currently breastfeeding.  Body mass index is 28.47 kg/m².    Physical Exam  Vitals and nursing " note reviewed.   Constitutional:       Appearance: She is well-developed. She is not toxic-appearing.   HENT:      Head: Normocephalic and atraumatic.      Right Ear: Hearing normal.      Left Ear: Hearing normal.      Nose: Nose normal.   Eyes:      General: Lids are normal.      Conjunctiva/sclera: Conjunctivae normal.      Pupils: Pupils are equal, round, and reactive to light.   Neck:      Vascular: No JVD.   Cardiovascular:      Rate and Rhythm: Normal rate and regular rhythm.      Pulses:           Radial pulses are 2+ on the right side and 2+ on the left side.   Pulmonary:      Effort: Pulmonary effort is normal. No respiratory distress.      Breath sounds: No stridor. No wheezing.   Musculoskeletal:      Cervical back: Normal range of motion.      Lumbar back: Tenderness present. Decreased range of motion. Negative right straight leg raise test and negative left straight leg raise test.      Right hip: No tenderness. Normal range of motion.      Left hip: Tenderness present. Decreased range of motion.      Comments: Negative straight leg raise test bilaterally   Skin:     General: Skin is warm and dry.      Findings: No erythema or rash.   Neurological:      Mental Status: She is alert and oriented to person, place, and time.      GCS: GCS eye subscore is 4. GCS verbal subscore is 5. GCS motor subscore is 6.      Cranial Nerves: No cranial nerve deficit.      Motor: No abnormal muscle tone.      Deep Tendon Reflexes: Reflexes are normal and symmetric. Reflexes normal.      Reflex Scores:       Patellar reflexes are 2+ on the right side and 2+ on the left side.       Achilles reflexes are 2+ on the right side and 2+ on the left side.     Comments: Casual, toe raise, heel raise, tandem gait all performed unremarkably.  Station is intact.   Psychiatric:         Behavior: Behavior normal.         Thought Content: Thought content normal.         Judgment: Judgment normal.         Assessment/Plan     Independent  Review of Radiographic Studies:      Available for my review is a MRI of the lumbar spine which was performed on September 30, 2021.  There is a very small disc herniation at L5-S1 eccentric to the right side.  This does efface the lateral recess at this level and may potentially be contributing to a right S1 radiculopathy.  Overall, lumbar lordosis is decreased.  There is degenerative disc disease present at L3-4 and L4-5.  I do not appreciate any other obvious foci of lateral recess or intraforaminal stenosis.  There are no Modic endplate changes.  There is no spondylolisthesis.    Medical Decision Making:      This is a 41-year-old woman with a subacute presentation of left-sided low back pain.  She does not have any complaints of sciatica or lumbosacral radiculopathy.  I do not see any obvious structural etiology for her pain on the basis of her MRI.  I carefully reviewed the signs and symptoms of lumbosacral radiculopathy and cauda equina with her.  I would be happy to follow-up with her on an as-needed basis moving forward.  I think she is an excellent candidate for physical therapy.    Diagnoses and all orders for this visit:    1. Chronic left-sided low back pain with left-sided sciatica (Primary)  -     Ambulatory Referral to Physical Therapy Evaluate and treat; Soft Tissue Mobilizaton; ROM, Stretching, Strengthening    2. Hip pain, chronic, left  -     Ambulatory Referral to Physical Therapy Evaluate and treat; Soft Tissue Mobilizaton; ROM, Stretching, Strengthening        No follow-ups on file.           This document signed by JOCELINE Champion MD October 18, 2021 15:12 EDT

## 2021-10-19 ENCOUNTER — TELEPHONE (OUTPATIENT)
Dept: NEUROSURGERY | Facility: CLINIC | Age: 41
End: 2021-10-19

## 2021-10-19 NOTE — TELEPHONE ENCOUNTER
Spoke with patient and letter written,  and faxed to attention Sue Christianson at [phone] successfully.    She was thankful for the help.

## 2021-10-19 NOTE — TELEPHONE ENCOUNTER
Pt called: she was seen yesterday by Dr. Champion and needs a RTW statement with any restrictions. She would like to pick this letter up in the office. Thanks!  Medical Decision Making:       This is a 41-year-old woman with a subacute presentation of left-sided low back pain.  She does not have any complaints of sciatica or lumbosacral radiculopathy.  I do not see any obvious structural etiology for her pain on the basis of her MRI.  I carefully reviewed the signs and symptoms of lumbosacral radiculopathy and cauda equina with her.  I would be happy to follow-up with her on an as-needed basis moving forward.  I think she is an excellent candidate for physical therapy.       Pt contact: [phone]  Best time to call: anytime

## 2021-10-19 NOTE — LETTER
October 19, 2021     Patient: Zofia Ortiz   YOB: 1980   Date of Visit: 10/19/2021       To Whom It May Concern:    It is my medical opinion that Zofia Ortiz may return to work/school without any restrictions.  If you have any questions or concerns, please don't hesitate to call the above referenced number.           Sincerely,        Luis Champion MD    CC:   No Recipients

## 2023-02-03 ENCOUNTER — APPOINTMENT (OUTPATIENT)
Dept: CT IMAGING | Facility: HOSPITAL | Age: 43
End: 2023-02-03
Payer: COMMERCIAL

## 2023-02-03 ENCOUNTER — HOSPITAL ENCOUNTER (EMERGENCY)
Facility: HOSPITAL | Age: 43
Discharge: HOME | End: 2023-02-03
Attending: EMERGENCY MEDICINE | Admitting: EMERGENCY MEDICINE
Payer: COMMERCIAL

## 2023-02-03 VITALS
HEIGHT: 66 IN | SYSTOLIC BLOOD PRESSURE: 128 MMHG | RESPIRATION RATE: 18 BRPM | HEART RATE: 74 BPM | OXYGEN SATURATION: 100 % | TEMPERATURE: 97.9 F | DIASTOLIC BLOOD PRESSURE: 75 MMHG | BODY MASS INDEX: 25.71 KG/M2 | WEIGHT: 160 LBS

## 2023-02-03 DIAGNOSIS — R10.9 ACUTE ABDOMINAL PAIN: Primary | ICD-10-CM

## 2023-02-03 DIAGNOSIS — M54.9 ACUTE BACK PAIN, UNSPECIFIED BACK LOCATION, UNSPECIFIED BACK PAIN LATERALITY: ICD-10-CM

## 2023-02-03 DIAGNOSIS — N88.8 NABOTHIAN CYST: ICD-10-CM

## 2023-02-03 LAB
ALBUMIN SERPL-MCNC: 4.1 G/DL (ref 3.5–5.2)
ALBUMIN/GLOB SERPL: 1.3 G/DL
ALP SERPL-CCNC: 96 U/L (ref 39–117)
ALT SERPL W P-5'-P-CCNC: 13 U/L (ref 1–33)
AMPHET+METHAMPHET UR QL: POSITIVE
AMPHETAMINES UR QL: POSITIVE
ANION GAP SERPL CALCULATED.3IONS-SCNC: 11 MMOL/L (ref 5–15)
AST SERPL-CCNC: 19 U/L (ref 1–32)
BARBITURATES UR QL SCN: NEGATIVE
BASOPHILS # BLD AUTO: 0.1 10*3/MM3 (ref 0–0.2)
BASOPHILS NFR BLD AUTO: 1.3 % (ref 0–1.5)
BENZODIAZ UR QL SCN: NEGATIVE
BILIRUB SERPL-MCNC: 0.2 MG/DL (ref 0–1.2)
BILIRUB UR QL STRIP: NEGATIVE
BUN SERPL-MCNC: 8 MG/DL (ref 6–20)
BUN/CREAT SERPL: 12.7 (ref 7–25)
BUPRENORPHINE SERPL-MCNC: NEGATIVE NG/ML
CALCIUM SPEC-SCNC: 9.4 MG/DL (ref 8.6–10.5)
CANNABINOIDS SERPL QL: NEGATIVE
CHLORIDE SERPL-SCNC: 98 MMOL/L (ref 98–107)
CLARITY UR: CLEAR
CO2 SERPL-SCNC: 29 MMOL/L (ref 22–29)
COCAINE UR QL: NEGATIVE
COLOR UR: YELLOW
CREAT SERPL-MCNC: 0.63 MG/DL (ref 0.57–1)
DEPRECATED RDW RBC AUTO: 43.4 FL (ref 37–54)
EGFRCR SERPLBLD CKD-EPI 2021: 113.7 ML/MIN/1.73
EOSINOPHIL # BLD AUTO: 0.59 10*3/MM3 (ref 0–0.4)
EOSINOPHIL NFR BLD AUTO: 7.5 % (ref 0.3–6.2)
ERYTHROCYTE [DISTWIDTH] IN BLOOD BY AUTOMATED COUNT: 11.9 % (ref 12.3–15.4)
ETHANOL BLD-MCNC: <10 MG/DL (ref 0–10)
GLOBULIN UR ELPH-MCNC: 3.1 GM/DL
GLUCOSE SERPL-MCNC: 102 MG/DL (ref 65–99)
GLUCOSE UR STRIP-MCNC: NEGATIVE MG/DL
HCT VFR BLD AUTO: 43.4 % (ref 34–46.6)
HGB BLD-MCNC: 14.9 G/DL (ref 12–15.9)
HGB UR QL STRIP.AUTO: NEGATIVE
HOLD SPECIMEN: NORMAL
IMM GRANULOCYTES # BLD AUTO: 0.03 10*3/MM3 (ref 0–0.05)
IMM GRANULOCYTES NFR BLD AUTO: 0.4 % (ref 0–0.5)
KETONES UR QL STRIP: ABNORMAL
LEUKOCYTE ESTERASE UR QL STRIP.AUTO: NEGATIVE
LIPASE SERPL-CCNC: 60 U/L (ref 13–60)
LYMPHOCYTES # BLD AUTO: 3.05 10*3/MM3 (ref 0.7–3.1)
LYMPHOCYTES NFR BLD AUTO: 38.7 % (ref 19.6–45.3)
MCH RBC QN AUTO: 34.2 PG (ref 26.6–33)
MCHC RBC AUTO-ENTMCNC: 34.3 G/DL (ref 31.5–35.7)
MCV RBC AUTO: 99.5 FL (ref 79–97)
METHADONE UR QL SCN: NEGATIVE
MONOCYTES # BLD AUTO: 0.77 10*3/MM3 (ref 0.1–0.9)
MONOCYTES NFR BLD AUTO: 9.8 % (ref 5–12)
NEUTROPHILS NFR BLD AUTO: 3.35 10*3/MM3 (ref 1.7–7)
NEUTROPHILS NFR BLD AUTO: 42.3 % (ref 42.7–76)
NITRITE UR QL STRIP: NEGATIVE
NRBC BLD AUTO-RTO: 0 /100 WBC (ref 0–0.2)
OPIATES UR QL: NEGATIVE
OXYCODONE UR QL SCN: NEGATIVE
PCP UR QL SCN: NEGATIVE
PH UR STRIP.AUTO: >=9 [PH] (ref 5–8)
PLATELET # BLD AUTO: 412 10*3/MM3 (ref 140–450)
PMV BLD AUTO: 8.5 FL (ref 6–12)
POTASSIUM SERPL-SCNC: 3.3 MMOL/L (ref 3.5–5.2)
PROPOXYPH UR QL: NEGATIVE
PROT SERPL-MCNC: 7.2 G/DL (ref 6–8.5)
PROT UR QL STRIP: NEGATIVE
RBC # BLD AUTO: 4.36 10*6/MM3 (ref 3.77–5.28)
SODIUM SERPL-SCNC: 138 MMOL/L (ref 136–145)
SP GR UR STRIP: 1.03 (ref 1–1.03)
TRICYCLICS UR QL SCN: NEGATIVE
TROPONIN T SERPL-MCNC: <0.01 NG/ML (ref 0–0.03)
UROBILINOGEN UR QL STRIP: ABNORMAL
WBC NRBC COR # BLD: 7.89 10*3/MM3 (ref 3.4–10.8)
WHOLE BLOOD HOLD COAG: NORMAL
WHOLE BLOOD HOLD SPECIMEN: NORMAL

## 2023-02-03 PROCEDURE — 85025 COMPLETE CBC W/AUTO DIFF WBC: CPT | Performed by: EMERGENCY MEDICINE

## 2023-02-03 PROCEDURE — 96375 TX/PRO/DX INJ NEW DRUG ADDON: CPT

## 2023-02-03 PROCEDURE — 80306 DRUG TEST PRSMV INSTRMNT: CPT | Performed by: NURSE PRACTITIONER

## 2023-02-03 PROCEDURE — 0 IOPAMIDOL PER 1 ML: Performed by: EMERGENCY MEDICINE

## 2023-02-03 PROCEDURE — 84484 ASSAY OF TROPONIN QUANT: CPT | Performed by: NURSE PRACTITIONER

## 2023-02-03 PROCEDURE — 83690 ASSAY OF LIPASE: CPT | Performed by: EMERGENCY MEDICINE

## 2023-02-03 PROCEDURE — 81003 URINALYSIS AUTO W/O SCOPE: CPT | Performed by: EMERGENCY MEDICINE

## 2023-02-03 PROCEDURE — 82077 ASSAY SPEC XCP UR&BREATH IA: CPT | Performed by: NURSE PRACTITIONER

## 2023-02-03 PROCEDURE — 99284 EMERGENCY DEPT VISIT MOD MDM: CPT

## 2023-02-03 PROCEDURE — 25010000002 ONDANSETRON PER 1 MG: Performed by: EMERGENCY MEDICINE

## 2023-02-03 PROCEDURE — 72131 CT LUMBAR SPINE W/O DYE: CPT

## 2023-02-03 PROCEDURE — 80053 COMPREHEN METABOLIC PANEL: CPT | Performed by: EMERGENCY MEDICINE

## 2023-02-03 PROCEDURE — 96374 THER/PROPH/DIAG INJ IV PUSH: CPT

## 2023-02-03 PROCEDURE — 70450 CT HEAD/BRAIN W/O DYE: CPT

## 2023-02-03 PROCEDURE — 25010000002 HYDROMORPHONE PER 4 MG: Performed by: EMERGENCY MEDICINE

## 2023-02-03 PROCEDURE — 74177 CT ABD & PELVIS W/CONTRAST: CPT

## 2023-02-03 PROCEDURE — 93005 ELECTROCARDIOGRAM TRACING: CPT | Performed by: NURSE PRACTITIONER

## 2023-02-03 RX ORDER — OXYCODONE HYDROCHLORIDE AND ACETAMINOPHEN 5; 325 MG/1; MG/1
1 TABLET ORAL EVERY 6 HOURS PRN
Qty: 10 TABLET | Refills: 0 | Status: SHIPPED | OUTPATIENT
Start: 2023-02-03

## 2023-02-03 RX ORDER — OXYCODONE AND ACETAMINOPHEN 7.5; 325 MG/1; MG/1
1 TABLET ORAL ONCE
Status: COMPLETED | OUTPATIENT
Start: 2023-02-03 | End: 2023-02-03

## 2023-02-03 RX ORDER — SODIUM CHLORIDE 9 MG/ML
10 INJECTION INTRAVENOUS AS NEEDED
Status: DISCONTINUED | OUTPATIENT
Start: 2023-02-03 | End: 2023-02-03 | Stop reason: HOSPADM

## 2023-02-03 RX ORDER — HYDROMORPHONE HYDROCHLORIDE 1 MG/ML
0.5 INJECTION, SOLUTION INTRAMUSCULAR; INTRAVENOUS; SUBCUTANEOUS ONCE
Status: COMPLETED | OUTPATIENT
Start: 2023-02-03 | End: 2023-02-03

## 2023-02-03 RX ORDER — CYCLOBENZAPRINE HCL 10 MG
10 TABLET ORAL 3 TIMES DAILY PRN
Qty: 15 TABLET | Refills: 0 | Status: SHIPPED | OUTPATIENT
Start: 2023-02-03

## 2023-02-03 RX ORDER — ONDANSETRON 2 MG/ML
4 INJECTION INTRAMUSCULAR; INTRAVENOUS ONCE
Status: COMPLETED | OUTPATIENT
Start: 2023-02-03 | End: 2023-02-03

## 2023-02-03 RX ADMIN — ONDANSETRON 4 MG: 2 INJECTION INTRAMUSCULAR; INTRAVENOUS at 09:41

## 2023-02-03 RX ADMIN — HYDROMORPHONE HYDROCHLORIDE 0.5 MG: 1 INJECTION, SOLUTION INTRAMUSCULAR; INTRAVENOUS; SUBCUTANEOUS at 09:40

## 2023-02-03 RX ADMIN — IOPAMIDOL 98 ML: 755 INJECTION, SOLUTION INTRAVENOUS at 09:22

## 2023-02-03 RX ADMIN — SODIUM CHLORIDE 1000 ML: 9 INJECTION, SOLUTION INTRAVENOUS at 09:40

## 2023-02-03 RX ADMIN — OXYCODONE HYDROCHLORIDE AND ACETAMINOPHEN 1 TABLET: 7.5; 325 TABLET ORAL at 13:21

## 2023-02-03 NOTE — ED PROVIDER NOTES
Subjective   History of Present Illness  Patient presents with diffuse abdominal pain as well as low back pain that started around 2 hours prior to arrival.  Patient tells me she woke up fine was able to get up and walk around.  She has a history of gastric sleeve as well as a history of low back pain.  Similar presentation of 2021.  Has been tells me she did drink alcohol last night.  Denies any trauma.  No chest pain no difficulty breathing.  Patient has been pain is worse with movement as well as walking.  She does have some pain that radiates into her left leg.  She denies any loss of bowel or bladder tells me she has been constipated.  To the point where she has had to give herself enemas.    Patient is very hypersensitive to pain.  Placing 1 finger on the fatty portion of her left flank induces severe pain even with gentle placement of the finger.  Skin is overly soft no evidence of cellulitis.        Review of Systems    Past Medical History:   Diagnosis Date   • Abnormal glucose    • Body piercing     EARS ONLY   • Dyspepsia     rare heartburn sx's, serum h. pyl neg   • Dyspnea on exertion    • Elevated BP     156/106 @ Intake, denies hx HTN.     • Elevated transaminase level    • Fatigue    • Former smoker     Vapes now   • GERD (gastroesophageal reflux disease)    • Morbid obesity (HCC)    •  (normal spontaneous vaginal delivery)     x 1 w/o complic   • Sleep apnea     newly dx, but suspects she has always had it   • Sleep apnea, obstructive 2018    recent sleep stuady change to bipap   • Tattoos     NECK, LEFT THIGH, RIGHT ANKLE   • Vitamin D deficiency        No Known Allergies    Past Surgical History:   Procedure Laterality Date   • ABDOMINOPLASTY N/A 2019    Procedure: ABDOMINOPLASTY;  Surgeon: Abisai Thurman MD;  [facility]: [facility];  Service: Plastics   •  SECTION WITH TUBAL  2007   • CYSTECTOMY      ORAL   • ENDOSCOPY N/A 3/14/2018    Procedure:  ESOPHAGOGASTRODUODENOSCOPY;  Surgeon: Glen Blackmon MD;  Location:  [facility];  Service: Bariatric   • GASTRIC SLEEVE LAPAROSCOPIC N/A 3/14/2018    Procedure: GASTRIC SLEEVE LAPAROSCOPIC;  Surgeon: Glen Blackmon MD;  Location:  [facility];  Service: Bariatric   • KNEE ARTHROSCOPY Right 2013   • MASTOPEXY Bilateral 4/30/2019    Procedure: BREAST LIFT BILATERAL;  Surgeon: Abisai Thurman MD;  Location:  [facility];  Service: Plastics   • WISDOM TOOTH EXTRACTION      ALL FOUR       Family History   Problem Relation Age of Onset   • Hypertension Mother    • No Known Problems Father    • No Known Problems Sister    • Obesity Maternal Grandmother    • Hypertension Maternal Grandmother    • Diabetes Maternal Grandmother    • Melanoma Maternal Grandfather        Social History     Socioeconomic History   • Marital status:      Spouse name: Bruce Ortiz   • Number of children: 2   • Years of education: Bachelor's   Tobacco Use   • Smoking status: Former     Packs/day: 0.50     Years: 8.00     Pack years: 4.00     Types: Cigarettes     Quit date: 2013     Years since quitting: 10.0   • Smokeless tobacco: Never   Vaping Use   • Vaping Use: Never used   Substance and Sexual Activity   • Alcohol use: Yes     Comment: Occasional   • Drug use: Yes     Types: Marijuana     Comment: 3-5 a month to sleep   • Sexual activity: Yes     Partners: Male     Comment:             Objective   Physical Exam  Constitutional:       Appearance: She is well-developed.   HENT:      Head: Normocephalic and atraumatic.      Right Ear: External ear normal.      Left Ear: External ear normal.      Nose: Nose normal.   Eyes:      Conjunctiva/sclera: Conjunctivae normal.      Pupils: Pupils are equal, round, and reactive to light.   Cardiovascular:      Rate and Rhythm: Normal rate and regular rhythm.      Heart sounds: Normal heart sounds.   Pulmonary:      Effort: Pulmonary effort is normal.      Breath sounds: Normal breath  sounds.   Abdominal:      General: Bowel sounds are normal.      Palpations: Abdomen is soft.      Tenderness: There is abdominal tenderness.   Musculoskeletal:         General: Normal range of motion.      Cervical back: Normal range of motion and neck supple.      Comments: Painful range of motion in the lower back.  Pain out of proportion with gentle examination of the paravertebral areas of her lower back and flank.  Placing one finger on the fatty portion of her flank causes her severe pain even without the palpation.  Pain is obviously out of proportion to the exam conducted.  Sensation intact lower extremities with good pulses.  Full range of motion throughout her entire body.   Skin:     General: Skin is warm and dry.   Neurological:      Mental Status: She is alert and oriented to person, place, and time.   Psychiatric:         Behavior: Behavior normal.         Thought Content: Thought content normal.         Judgment: Judgment normal.         Procedures           ED Course  ED Course as of 02/03/23 1320   Fri Feb 03, 2023   0900 Broad differential.  Patient has a history of gastric sleeve and diffuse abdominal and flank pain.  Patient also has some low back pain that radiates into her legs with a history of disc problems.  Old records reviewed.  September 2021 seem like a similar presentation with investigate her abdomen as well as her spine and had some difficulty controlling her pain.  She is acting somewhat odd and mumbles a great deal.  I did clarify with the  that that is does not really seem like her.  I did ask about drug use and he denied this asked about alcohol and he says yes she does drink he does not think she drinks a lot but does drink most days with most recently being last night around 7:00.  Huge differential including bulging disc cauda equina bowel obstruction electrolyte imbalance as she was also placed on Lasix for edema.  We will also need to get a CT head for further  evaluation as well. [JM]   1015 Awaiting urinalysis [JM]   1140 Pt kapser shows vyvanse which explains her positive uds [JM]   1239 Patient feels significantly better.  She is much more alert.  Able to move around in bed much easier.  And she is ready to go home.  She will touch base with Dr. Champion she was supposed to go to physical therapy but actually felt better after her injections and never ended up going.  I think it is reasonable to try that again.  No surgical complications noted on the CT of her abdomen pelvis.  More likely this is related to musculoskeletal pain in her low back.  She does not have any cauda equina symptoms.  There is no numbness or tingling is no loss of bowel bladder in her groin.  Good strength throughout able to move around the bed without difficulty.  We will also give a short course of pain medications. [JM]   1244 Had a very long sitdown conversation the patient I discussed the findings on her CAT scan including her cysts as well as appropriate follow-up.  This could be the cause of her abdominal pain and back pain although I think that is unlikely however it is very important for her to be followed up on and monitored for stability.  She says her primary care doctor does GYN type exams also will give her follow-up to OB/GYN Dr. Moses as well or one of her associates.  She understands importance of follow-up to maintain check for stability in size as this could manifest as increased pain vaginal bleeding painful sex counseled to rule out differential which also includes cancer as well.  All are thankful. [JM]      ED Course User Index  [JM] Louis Banuelos APRN                CT Abdomen Pelvis With Contrast   Final Result   Impression:   No acute intracranial abnormality.      No acute fracture or traumatic malalignment of the lumbar spine.      No acute findings in the abdomen and pelvis.       Potentially incidental prominent cystic findings are noted projecting within the region of  the proximal uterus/cervix, largest measuring 3.5 cm, possible large nabothian cysts.      Electronically Signed: Lupillo Llamas     2/3/2023 9:46 AM EST     Workstation ID: [id number]      CT Lumbar Spine Without Contrast   Final Result   Impression:   No acute intracranial abnormality.      No acute fracture or traumatic malalignment of the lumbar spine.      No acute findings in the abdomen and pelvis.       Potentially incidental prominent cystic findings are noted projecting within the region of the proximal uterus/cervix, largest measuring 3.5 cm, possible large nabothian cysts.      Electronically Signed: Lupillo Llamas     2/3/2023 9:46 AM EST     Workstation ID: [id number]      CT Head Without Contrast   Final Result   Impression:   No acute intracranial abnormality.      No acute fracture or traumatic malalignment of the lumbar spine.      No acute findings in the abdomen and pelvis.       Potentially incidental prominent cystic findings are noted projecting within the region of the proximal uterus/cervix, largest measuring 3.5 cm, possible large nabothian cysts.      Electronically Signed: Lupillo Llamas     2/3/2023 9:46 AM EST     Workstation ID: [id number]                           LUCIO reviewed by Matt Fu MD       Medical Decision Making  Acute abdominal pain: complicated acute illness or injury  Acute back pain, unspecified back location, unspecified back pain laterality: complicated acute illness or injury  Nabothian cyst: complicated acute illness or injury  Amount and/or Complexity of Data Reviewed  External Data Reviewed: labs, radiology, ECG and notes.  Labs: ordered. Decision-making details documented in ED Course.  Radiology: ordered. Decision-making details documented in ED Course.  ECG/medicine tests: ordered. Decision-making details documented in ED Course.  Discussion of management or test interpretation with external provider(s): Discussed the broad differential including her cervical  cysts.  History of gastric sleeve as well as a history of back pain as well as appropriate follow-up to OB/GYN and her neurosurgeon.    Risk  OTC drugs.  Prescription drug management.          Final diagnoses:   Acute abdominal pain   Acute back pain, unspecified back location, unspecified back pain laterality   Nabothian cyst       ED Disposition  ED Disposition     ED Disposition   Discharge    Condition   Stable    Comment   --             Morgan Arguello MD  [address]  [address]  [place] [id number]  [phone]    Schedule an appointment as soon as possible for a visit       Luis Champion MD  [address] [facility]  [address]  [place] [address]  [phone]    Schedule an appointment as soon as possible for a visit       [facility] Emergency Department  [address] [phone]    If symptoms worsen    Tova Moses MD  [address] [facility]  [address]  [place] [address]  [phone]    Schedule an appointment as soon as possible for a visit in 1 week  For further evaluation of your nabothian cyst         Medication List      New Prescriptions    cyclobenzaprine 10 MG tablet  Commonly known as: FLEXERIL  Take 1 tablet by mouth 3 (Three) Times a Day As Needed for Muscle Spasms.        Changed    oxyCODONE-acetaminophen 5-325 MG per tablet  Commonly known as: PERCOCET  Take 1 tablet by mouth Every 6 (Six) Hours As Needed for Severe Pain.  What changed:   · when to take this  · reasons to take this        Stop    metaxalone 800 MG tablet  Commonly known as: SKELAXIN           Where to Get Your Medications      These medications were sent to [id number] - [place], [place] - [address] [facility] [address] AT [address] [facility] - [phone] [facility] [phone] FX  [address] [facility] [address] [facility] [address], [facility] [address]    Phone: [phone]   · cyclobenzaprine 10 MG  tablet  · oxyCODONE-acetaminophen 5-325 MG per tablet          Louis Banuelos, APRN  02/03/23 [id number]

## 2023-02-07 LAB
QT INTERVAL: 440 MS
QTC INTERVAL: 461 MS

## 2023-03-21 ENCOUNTER — TRANSCRIBE ORDERS (OUTPATIENT)
Dept: LAB | Facility: HOSPITAL | Age: 43
End: 2023-03-21
Payer: COMMERCIAL

## 2023-03-21 ENCOUNTER — LAB (OUTPATIENT)
Dept: LAB | Facility: HOSPITAL | Age: 43
End: 2023-03-21
Payer: COMMERCIAL

## 2023-03-21 DIAGNOSIS — Z00.01 ENCOUNTER FOR GENERAL ADULT MEDICAL EXAMINATION WITH ABNORMAL FINDINGS: Primary | ICD-10-CM

## 2023-03-21 DIAGNOSIS — Z00.01 ENCOUNTER FOR GENERAL ADULT MEDICAL EXAMINATION WITH ABNORMAL FINDINGS: ICD-10-CM

## 2023-03-21 LAB
HBV SURFACE AG SERPL QL IA: NORMAL
HCV AB SER DONR QL: NORMAL
HIV1+2 AB SER QL: NORMAL

## 2023-03-21 PROCEDURE — 87340 HEPATITIS B SURFACE AG IA: CPT

## 2023-03-21 PROCEDURE — G0432 EIA HIV-1/HIV-2 SCREEN: HCPCS

## 2023-03-21 PROCEDURE — 36415 COLL VENOUS BLD VENIPUNCTURE: CPT

## 2023-03-21 PROCEDURE — 86780 TREPONEMA PALLIDUM: CPT

## 2023-03-21 PROCEDURE — 86803 HEPATITIS C AB TEST: CPT

## 2023-03-22 LAB — TREPONEMA PALLIDUM IGG+IGM AB [PRESENCE] IN SERUM OR PLASMA BY IMMUNOASSAY: NON REACTIVE

## 2023-05-16 ENCOUNTER — PREP FOR SURGERY (OUTPATIENT)
Dept: OTHER | Facility: HOSPITAL | Age: 43
End: 2023-05-16
Payer: COMMERCIAL

## 2023-05-16 DIAGNOSIS — N92.4 EXCESSIVE BLEEDING IN PREMENOPAUSAL PERIOD: Primary | ICD-10-CM

## 2023-05-16 RX ORDER — CEFAZOLIN SODIUM 2 G/100ML
2 INJECTION, SOLUTION INTRAVENOUS ONCE
OUTPATIENT
Start: 2023-05-16 | End: 2023-05-16

## 2023-05-16 RX ORDER — SODIUM CHLORIDE 0.9 % (FLUSH) 0.9 %
10 SYRINGE (ML) INJECTION AS NEEDED
OUTPATIENT
Start: 2023-05-16

## 2023-05-16 RX ORDER — SODIUM CHLORIDE 9 MG/ML
40 INJECTION, SOLUTION INTRAVENOUS AS NEEDED
OUTPATIENT
Start: 2023-05-16

## 2023-05-16 RX ORDER — ACETAMINOPHEN 500 MG
1000 TABLET ORAL ONCE
OUTPATIENT
Start: 2023-05-16 | End: 2023-05-16

## 2023-05-16 RX ORDER — HEPARIN SODIUM 5000 [USP'U]/ML
5000 INJECTION, SOLUTION INTRAVENOUS; SUBCUTANEOUS ONCE
OUTPATIENT
Start: 2023-05-16 | End: 2023-05-16

## 2023-05-16 RX ORDER — SODIUM CHLORIDE 0.9 % (FLUSH) 0.9 %
3 SYRINGE (ML) INJECTION EVERY 12 HOURS SCHEDULED
OUTPATIENT
Start: 2023-05-16

## 2023-05-16 RX ORDER — GABAPENTIN 300 MG/1
600 CAPSULE ORAL ONCE
OUTPATIENT
Start: 2023-05-16 | End: 2023-05-16

## 2023-05-22 ENCOUNTER — PRE-ADMISSION TESTING (OUTPATIENT)
Dept: PREADMISSION TESTING | Facility: HOSPITAL | Age: 43
End: 2023-05-22
Payer: COMMERCIAL

## 2023-05-22 VITALS — BODY MASS INDEX: 24.18 KG/M2 | HEIGHT: 66 IN | WEIGHT: 150.46 LBS

## 2023-05-22 DIAGNOSIS — N92.4 EXCESSIVE BLEEDING IN PREMENOPAUSAL PERIOD: ICD-10-CM

## 2023-05-22 LAB
BASOPHILS # BLD AUTO: 0.08 10*3/MM3 (ref 0–0.2)
BASOPHILS NFR BLD AUTO: 1.2 % (ref 0–1.5)
DEPRECATED RDW RBC AUTO: 48.7 FL (ref 37–54)
EOSINOPHIL # BLD AUTO: 0.29 10*3/MM3 (ref 0–0.4)
EOSINOPHIL NFR BLD AUTO: 4.3 % (ref 0.3–6.2)
ERYTHROCYTE [DISTWIDTH] IN BLOOD BY AUTOMATED COUNT: 13.9 % (ref 12.3–15.4)
HCT VFR BLD AUTO: 46.2 % (ref 34–46.6)
HGB BLD-MCNC: 15.3 G/DL (ref 12–15.9)
IMM GRANULOCYTES # BLD AUTO: 0.01 10*3/MM3 (ref 0–0.05)
IMM GRANULOCYTES NFR BLD AUTO: 0.1 % (ref 0–0.5)
LYMPHOCYTES # BLD AUTO: 2.81 10*3/MM3 (ref 0.7–3.1)
LYMPHOCYTES NFR BLD AUTO: 41.9 % (ref 19.6–45.3)
MCH RBC QN AUTO: 31.4 PG (ref 26.6–33)
MCHC RBC AUTO-ENTMCNC: 33.1 G/DL (ref 31.5–35.7)
MCV RBC AUTO: 94.7 FL (ref 79–97)
MONOCYTES # BLD AUTO: 0.32 10*3/MM3 (ref 0.1–0.9)
MONOCYTES NFR BLD AUTO: 4.8 % (ref 5–12)
NEUTROPHILS NFR BLD AUTO: 3.2 10*3/MM3 (ref 1.7–7)
NEUTROPHILS NFR BLD AUTO: 47.7 % (ref 42.7–76)
NRBC BLD AUTO-RTO: 0 /100 WBC (ref 0–0.2)
PLATELET # BLD AUTO: 334 10*3/MM3 (ref 140–450)
PMV BLD AUTO: 8.5 FL (ref 6–12)
RBC # BLD AUTO: 4.88 10*6/MM3 (ref 3.77–5.28)
WBC NRBC COR # BLD: 6.71 10*3/MM3 (ref 3.4–10.8)

## 2023-05-22 PROCEDURE — 36415 COLL VENOUS BLD VENIPUNCTURE: CPT

## 2023-05-22 PROCEDURE — 85025 COMPLETE CBC W/AUTO DIFF WBC: CPT

## 2023-05-22 RX ORDER — OMEPRAZOLE 10 MG/1
CAPSULE, DELAYED RELEASE ORAL
COMMUNITY

## 2023-05-22 RX ORDER — LISDEXAMFETAMINE DIMESYLATE 10 MG/1
CAPSULE ORAL
COMMUNITY

## 2023-05-22 RX ORDER — FLUOXETINE 10 MG/1
CAPSULE ORAL
COMMUNITY

## 2023-05-22 NOTE — PAT
An arrival time for procedure was not provided during PAT visit. If patient had any questions or concerns about their arrival time, they were instructed to contact their surgeon/physician.  Additionally, if the patient referred to an arrival time that was acquired from their my chart account, patient was encouraged to verify that time with their surgeon/physician. Arrival times are NOT provided in Pre Admission Testing Department.    Patient viewed general PAT education video as instructed in their preoperative information received from their surgeon.  Patient stated the general PAT education video was viewed in its entirety and survey completed.  Copies of PAT general education handouts (Incentive Spirometry, Meds to Beds Program, Patient Belongings, Pre-op skin preparation instructions, Blood Glucose testing, Visitor policy, Surgery FAQ, Code H) distributed to patient if not printed. Education related to the PAT pass and skin preparation for surgery (if applicable) completed in PAT as a reinforcement to PAT education video. Patient instructed to return PAT pass provided today as well as completed skin preparation sheet (if applicable) on the day of procedure.     Additionally if patient had not viewed video yet but intended to view it at home or in our waiting area, then referred them to the handout with QR code/link provided during PAT visit.  Instructed patient to complete survey after viewing the video in its entirety.  Encouraged patient/family to read PAT general education handouts thoroughly and notify PAT staff with any questions or concerns. Patient verbalized understanding of all information and priority content.    Patient to apply Chlorhexadine wipes  to surgical area (as instructed) the night before procedure and the AM of procedure. Wipes provided.    Patient instructed to drink 20 ounces of Gatorade and it needs to be completed 1 hour (for [facility] patients) or 2 hours (scheduled  section &  [facility] patients) before given arrival time for procedure (NO RED Gatorade)    Patient verbalized understanding.    Instructed patient to take two Tylenol extra strength (total of 1000 mg) the night before surgery.    Patient denies any current skin issues.     EKG from 2/3/23 in chart.

## 2023-06-05 ENCOUNTER — ANESTHESIA EVENT (OUTPATIENT)
Dept: PERIOP | Facility: HOSPITAL | Age: 43
End: 2023-06-05
Payer: COMMERCIAL

## 2023-06-05 ENCOUNTER — ANESTHESIA (OUTPATIENT)
Dept: PERIOP | Facility: HOSPITAL | Age: 43
End: 2023-06-05
Payer: COMMERCIAL

## 2023-06-05 ENCOUNTER — HOSPITAL ENCOUNTER (OUTPATIENT)
Facility: HOSPITAL | Age: 43
Setting detail: HOSPITAL OUTPATIENT SURGERY
Discharge: HOME | End: 2023-06-05
Attending: OBSTETRICS & GYNECOLOGY | Admitting: OBSTETRICS & GYNECOLOGY
Payer: COMMERCIAL

## 2023-06-05 VITALS
SYSTOLIC BLOOD PRESSURE: 154 MMHG | TEMPERATURE: 97.8 F | DIASTOLIC BLOOD PRESSURE: 95 MMHG | RESPIRATION RATE: 18 BRPM | OXYGEN SATURATION: 97 % | HEART RATE: 61 BPM

## 2023-06-05 DIAGNOSIS — N92.0 MENORRHAGIA: ICD-10-CM

## 2023-06-05 DIAGNOSIS — N99.85 POST ENDOMETRIAL ABLATION SYNDROME: Primary | ICD-10-CM

## 2023-06-05 DIAGNOSIS — N92.4 EXCESSIVE BLEEDING IN PREMENOPAUSAL PERIOD: ICD-10-CM

## 2023-06-05 LAB
B-HCG UR QL: NEGATIVE
EXPIRATION DATE: NORMAL
INTERNAL NEGATIVE CONTROL: NORMAL
INTERNAL POSITIVE CONTROL: NORMAL
Lab: NORMAL
POTASSIUM SERPL-SCNC: 2.9 MMOL/L (ref 3.5–5.2)

## 2023-06-05 PROCEDURE — 25010000002 SUGAMMADEX 200 MG/2ML SOLUTION: Performed by: NURSE ANESTHETIST, CERTIFIED REGISTERED

## 2023-06-05 PROCEDURE — 25010000002 CEFAZOLIN IN DEXTROSE 2-4 GM/100ML-% SOLUTION: Performed by: OBSTETRICS & GYNECOLOGY

## 2023-06-05 PROCEDURE — 25010000002 FENTANYL CITRATE (PF) 100 MCG/2ML SOLUTION: Performed by: NURSE ANESTHETIST, CERTIFIED REGISTERED

## 2023-06-05 PROCEDURE — 25010000002 HYDROMORPHONE 1 MG/ML SOLUTION

## 2023-06-05 PROCEDURE — 88307 TISSUE EXAM BY PATHOLOGIST: CPT | Performed by: OBSTETRICS & GYNECOLOGY

## 2023-06-05 PROCEDURE — 25010000002 ONDANSETRON PER 1 MG: Performed by: NURSE ANESTHETIST, CERTIFIED REGISTERED

## 2023-06-05 PROCEDURE — 84132 ASSAY OF SERUM POTASSIUM: CPT | Performed by: ANESTHESIOLOGY

## 2023-06-05 PROCEDURE — 25010000002 HEPARIN (PORCINE) PER 1000 UNITS: Performed by: OBSTETRICS & GYNECOLOGY

## 2023-06-05 PROCEDURE — 25010000002 FENTANYL CITRATE (PF) 50 MCG/ML SOLUTION

## 2023-06-05 PROCEDURE — 25010000002 DEXAMETHASONE PER 1 MG: Performed by: NURSE ANESTHETIST, CERTIFIED REGISTERED

## 2023-06-05 PROCEDURE — 81025 URINE PREGNANCY TEST: CPT | Performed by: OBSTETRICS & GYNECOLOGY

## 2023-06-05 PROCEDURE — 0 POTASSIUM CHLORIDE 10 MEQ/100ML SOLUTION: Performed by: NURSE ANESTHETIST, CERTIFIED REGISTERED

## 2023-06-05 PROCEDURE — 25010000002 PROPOFOL 10 MG/ML EMULSION: Performed by: NURSE ANESTHETIST, CERTIFIED REGISTERED

## 2023-06-05 PROCEDURE — 25010000002 MIDAZOLAM PER 1 MG: Performed by: ANESTHESIOLOGY

## 2023-06-05 RX ORDER — ACETAMINOPHEN 325 MG/1
650 TABLET ORAL EVERY 4 HOURS PRN
Qty: 100 TABLET | Refills: 0 | Status: SHIPPED | OUTPATIENT
Start: 2023-06-05 | End: 2023-06-15

## 2023-06-05 RX ORDER — LIDOCAINE HYDROCHLORIDE 10 MG/ML
INJECTION, SOLUTION EPIDURAL; INFILTRATION; INTRACAUDAL; PERINEURAL AS NEEDED
Status: DISCONTINUED | OUTPATIENT
Start: 2023-06-05 | End: 2023-06-05 | Stop reason: SURG

## 2023-06-05 RX ORDER — SODIUM CHLORIDE 0.9 % (FLUSH) 0.9 %
10 SYRINGE (ML) INJECTION EVERY 12 HOURS SCHEDULED
Status: CANCELLED | OUTPATIENT
Start: 2023-06-05

## 2023-06-05 RX ORDER — HEPARIN SODIUM 5000 [USP'U]/ML
5000 INJECTION, SOLUTION INTRAVENOUS; SUBCUTANEOUS ONCE
Status: DISCONTINUED | OUTPATIENT
Start: 2023-06-05 | End: 2023-06-05 | Stop reason: HOSPADM

## 2023-06-05 RX ORDER — SODIUM CHLORIDE 9 MG/ML
INJECTION, SOLUTION INTRAVENOUS AS NEEDED
Status: DISCONTINUED | OUTPATIENT
Start: 2023-06-05 | End: 2023-06-05 | Stop reason: HOSPADM

## 2023-06-05 RX ORDER — LIDOCAINE HYDROCHLORIDE 10 MG/ML
0.5 INJECTION, SOLUTION EPIDURAL; INFILTRATION; INTRACAUDAL; PERINEURAL ONCE AS NEEDED
Status: COMPLETED | OUTPATIENT
Start: 2023-06-05 | End: 2023-06-05

## 2023-06-05 RX ORDER — FENTANYL CITRATE 50 UG/ML
INJECTION, SOLUTION INTRAMUSCULAR; INTRAVENOUS
Status: COMPLETED
Start: 2023-06-05 | End: 2023-06-05

## 2023-06-05 RX ORDER — FENTANYL CITRATE 50 UG/ML
50 INJECTION, SOLUTION INTRAMUSCULAR; INTRAVENOUS
Status: DISCONTINUED | OUTPATIENT
Start: 2023-06-05 | End: 2023-06-05 | Stop reason: HOSPADM

## 2023-06-05 RX ORDER — DROPERIDOL 2.5 MG/ML
0.62 INJECTION, SOLUTION INTRAMUSCULAR; INTRAVENOUS ONCE AS NEEDED
Status: DISCONTINUED | OUTPATIENT
Start: 2023-06-05 | End: 2023-06-05 | Stop reason: HOSPADM

## 2023-06-05 RX ORDER — FENTANYL CITRATE 50 UG/ML
INJECTION, SOLUTION INTRAMUSCULAR; INTRAVENOUS AS NEEDED
Status: DISCONTINUED | OUTPATIENT
Start: 2023-06-05 | End: 2023-06-05 | Stop reason: SURG

## 2023-06-05 RX ORDER — SODIUM CHLORIDE 0.9 % (FLUSH) 0.9 %
3 SYRINGE (ML) INJECTION EVERY 12 HOURS SCHEDULED
Status: DISCONTINUED | OUTPATIENT
Start: 2023-06-05 | End: 2023-06-05 | Stop reason: HOSPADM

## 2023-06-05 RX ORDER — ACETAMINOPHEN 325 MG/1
650 TABLET ORAL ONCE
Status: DISCONTINUED | OUTPATIENT
Start: 2023-06-05 | End: 2023-06-05 | Stop reason: HOSPADM

## 2023-06-05 RX ORDER — OXYCODONE HYDROCHLORIDE AND ACETAMINOPHEN 5; 325 MG/1; MG/1
TABLET ORAL
Status: COMPLETED
Start: 2023-06-05 | End: 2023-06-05

## 2023-06-05 RX ORDER — GABAPENTIN 300 MG/1
600 CAPSULE ORAL ONCE
Status: COMPLETED | OUTPATIENT
Start: 2023-06-05 | End: 2023-06-05

## 2023-06-05 RX ORDER — ONDANSETRON 4 MG/1
4 TABLET, FILM COATED ORAL ONCE AS NEEDED
Status: DISCONTINUED | OUTPATIENT
Start: 2023-06-05 | End: 2023-06-05 | Stop reason: HOSPADM

## 2023-06-05 RX ORDER — CEFAZOLIN SODIUM 2 G/100ML
2 INJECTION, SOLUTION INTRAVENOUS ONCE
Status: COMPLETED | OUTPATIENT
Start: 2023-06-05 | End: 2023-06-05

## 2023-06-05 RX ORDER — FENTANYL CITRATE 50 UG/ML
INJECTION, SOLUTION INTRAMUSCULAR; INTRAVENOUS
Status: DISCONTINUED
Start: 2023-06-05 | End: 2023-06-05 | Stop reason: HOSPADM

## 2023-06-05 RX ORDER — ONDANSETRON 2 MG/ML
INJECTION INTRAMUSCULAR; INTRAVENOUS AS NEEDED
Status: DISCONTINUED | OUTPATIENT
Start: 2023-06-05 | End: 2023-06-05 | Stop reason: SURG

## 2023-06-05 RX ORDER — SODIUM CHLORIDE 0.9 % (FLUSH) 0.9 %
10 SYRINGE (ML) INJECTION AS NEEDED
Status: DISCONTINUED | OUTPATIENT
Start: 2023-06-05 | End: 2023-06-05 | Stop reason: HOSPADM

## 2023-06-05 RX ORDER — PROPOFOL 10 MG/ML
VIAL (ML) INTRAVENOUS AS NEEDED
Status: DISCONTINUED | OUTPATIENT
Start: 2023-06-05 | End: 2023-06-05 | Stop reason: SURG

## 2023-06-05 RX ORDER — LABETALOL HYDROCHLORIDE 5 MG/ML
5 INJECTION, SOLUTION INTRAVENOUS
Status: DISCONTINUED | OUTPATIENT
Start: 2023-06-05 | End: 2023-06-05 | Stop reason: HOSPADM

## 2023-06-05 RX ORDER — SODIUM CHLORIDE, SODIUM LACTATE, POTASSIUM CHLORIDE, CALCIUM CHLORIDE 600; 310; 30; 20 MG/100ML; MG/100ML; MG/100ML; MG/100ML
9 INJECTION, SOLUTION INTRAVENOUS CONTINUOUS
Status: DISCONTINUED | OUTPATIENT
Start: 2023-06-05 | End: 2023-06-05 | Stop reason: HOSPADM

## 2023-06-05 RX ORDER — FAMOTIDINE 10 MG/ML
20 INJECTION, SOLUTION INTRAVENOUS ONCE
Status: CANCELLED | OUTPATIENT
Start: 2023-06-05 | End: 2023-06-05

## 2023-06-05 RX ORDER — MIDAZOLAM HYDROCHLORIDE 1 MG/ML
2 INJECTION INTRAMUSCULAR; INTRAVENOUS ONCE
Status: COMPLETED | OUTPATIENT
Start: 2023-06-05 | End: 2023-06-05

## 2023-06-05 RX ORDER — DEXAMETHASONE SODIUM PHOSPHATE 4 MG/ML
INJECTION, SOLUTION INTRA-ARTICULAR; INTRALESIONAL; INTRAMUSCULAR; INTRAVENOUS; SOFT TISSUE AS NEEDED
Status: DISCONTINUED | OUTPATIENT
Start: 2023-06-05 | End: 2023-06-05 | Stop reason: SURG

## 2023-06-05 RX ORDER — MIDAZOLAM HYDROCHLORIDE 1 MG/ML
1 INJECTION INTRAMUSCULAR; INTRAVENOUS
Status: DISCONTINUED | OUTPATIENT
Start: 2023-06-05 | End: 2023-06-05 | Stop reason: HOSPADM

## 2023-06-05 RX ORDER — SODIUM CHLORIDE 0.9 % (FLUSH) 0.9 %
10 SYRINGE (ML) INJECTION AS NEEDED
Status: CANCELLED | OUTPATIENT
Start: 2023-06-05

## 2023-06-05 RX ORDER — OXYCODONE HYDROCHLORIDE AND ACETAMINOPHEN 5; 325 MG/1; MG/1
1 TABLET ORAL ONCE AS NEEDED
Status: COMPLETED | OUTPATIENT
Start: 2023-06-05 | End: 2023-06-05

## 2023-06-05 RX ORDER — POLYETHYLENE GLYCOL 3350 17 G/17G
17 POWDER, FOR SOLUTION ORAL DAILY
Qty: 225 G | Refills: 0 | Status: SHIPPED | OUTPATIENT
Start: 2023-06-05 | End: 2023-06-15

## 2023-06-05 RX ORDER — POTASSIUM CHLORIDE 7.45 MG/ML
INJECTION INTRAVENOUS CONTINUOUS PRN
Status: DISCONTINUED | OUTPATIENT
Start: 2023-06-05 | End: 2023-06-05 | Stop reason: SURG

## 2023-06-05 RX ORDER — HEPARIN SODIUM 5000 [USP'U]/ML
INJECTION, SOLUTION INTRAVENOUS; SUBCUTANEOUS AS NEEDED
Status: DISCONTINUED | OUTPATIENT
Start: 2023-06-05 | End: 2023-06-05 | Stop reason: HOSPADM

## 2023-06-05 RX ORDER — SODIUM CHLORIDE 9 MG/ML
40 INJECTION, SOLUTION INTRAVENOUS AS NEEDED
Status: CANCELLED | OUTPATIENT
Start: 2023-06-05

## 2023-06-05 RX ORDER — MELOXICAM 7.5 MG/1
7.5 TABLET ORAL DAILY
Qty: 20 TABLET | Refills: 0 | Status: SHIPPED | OUTPATIENT
Start: 2023-06-05 | End: 2023-06-15

## 2023-06-05 RX ORDER — BUPIVACAINE HYDROCHLORIDE AND EPINEPHRINE 5; 5 MG/ML; UG/ML
INJECTION, SOLUTION PERINEURAL AS NEEDED
Status: DISCONTINUED | OUTPATIENT
Start: 2023-06-05 | End: 2023-06-05 | Stop reason: HOSPADM

## 2023-06-05 RX ORDER — ACETAMINOPHEN 500 MG
1000 TABLET ORAL ONCE
Status: DISCONTINUED | OUTPATIENT
Start: 2023-06-05 | End: 2023-06-05 | Stop reason: HOSPADM

## 2023-06-05 RX ORDER — OXYCODONE HYDROCHLORIDE 5 MG/1
5 TABLET ORAL EVERY 4 HOURS PRN
Qty: 10 TABLET | Refills: 0 | Status: SHIPPED | OUTPATIENT
Start: 2023-06-05 | End: 2023-06-15

## 2023-06-05 RX ORDER — MAGNESIUM HYDROXIDE 1200 MG/15ML
LIQUID ORAL AS NEEDED
Status: DISCONTINUED | OUTPATIENT
Start: 2023-06-05 | End: 2023-06-05 | Stop reason: HOSPADM

## 2023-06-05 RX ORDER — FAMOTIDINE 20 MG/1
20 TABLET, FILM COATED ORAL ONCE
Status: COMPLETED | OUTPATIENT
Start: 2023-06-05 | End: 2023-06-05

## 2023-06-05 RX ORDER — ONDANSETRON 2 MG/ML
4 INJECTION INTRAMUSCULAR; INTRAVENOUS ONCE AS NEEDED
Status: DISCONTINUED | OUTPATIENT
Start: 2023-06-05 | End: 2023-06-05 | Stop reason: HOSPADM

## 2023-06-05 RX ORDER — ROCURONIUM BROMIDE 10 MG/ML
INJECTION, SOLUTION INTRAVENOUS AS NEEDED
Status: DISCONTINUED | OUTPATIENT
Start: 2023-06-05 | End: 2023-06-05 | Stop reason: SURG

## 2023-06-05 RX ORDER — MELOXICAM 15 MG/1
7.5 TABLET ORAL ONCE AS NEEDED
Status: DISCONTINUED | OUTPATIENT
Start: 2023-06-05 | End: 2023-06-05 | Stop reason: HOSPADM

## 2023-06-05 RX ADMIN — PROPOFOL 200 MG: 10 INJECTION, EMULSION INTRAVENOUS at 09:07

## 2023-06-05 RX ADMIN — SUGAMMADEX 200 MG: 100 INJECTION, SOLUTION INTRAVENOUS at 10:07

## 2023-06-05 RX ADMIN — ROCURONIUM BROMIDE 50 MG: 50 INJECTION, SOLUTION INTRAVENOUS at 09:07

## 2023-06-05 RX ADMIN — MIDAZOLAM HYDROCHLORIDE 2 MG: 1 INJECTION, SOLUTION INTRAMUSCULAR; INTRAVENOUS at 08:08

## 2023-06-05 RX ADMIN — SODIUM CHLORIDE, POTASSIUM CHLORIDE, SODIUM LACTATE AND CALCIUM CHLORIDE 9 ML/HR: 600; 310; 30; 20 INJECTION, SOLUTION INTRAVENOUS at 07:55

## 2023-06-05 RX ADMIN — FENTANYL CITRATE 50 MCG: 50 INJECTION, SOLUTION INTRAMUSCULAR; INTRAVENOUS at 11:43

## 2023-06-05 RX ADMIN — POTASSIUM CHLORIDE: 7.46 INJECTION, SOLUTION INTRAVENOUS at 09:15

## 2023-06-05 RX ADMIN — Medication 0.5 MG: at 10:40

## 2023-06-05 RX ADMIN — LIDOCAINE HYDROCHLORIDE 50 MG: 10 INJECTION, SOLUTION EPIDURAL; INFILTRATION; INTRACAUDAL; PERINEURAL at 09:07

## 2023-06-05 RX ADMIN — FAMOTIDINE 20 MG: 20 TABLET ORAL at 07:55

## 2023-06-05 RX ADMIN — FENTANYL CITRATE 50 MCG: 50 INJECTION, SOLUTION INTRAMUSCULAR; INTRAVENOUS at 11:07

## 2023-06-05 RX ADMIN — LIDOCAINE HYDROCHLORIDE 0.5 ML: 10 INJECTION, SOLUTION EPIDURAL; INFILTRATION; INTRACAUDAL; PERINEURAL at 07:55

## 2023-06-05 RX ADMIN — HYDROMORPHONE HYDROCHLORIDE 0.5 MG: 1 INJECTION, SOLUTION INTRAMUSCULAR; INTRAVENOUS; SUBCUTANEOUS at 10:40

## 2023-06-05 RX ADMIN — CEFAZOLIN SODIUM 2 G: 2 INJECTION, SOLUTION INTRAVENOUS at 09:06

## 2023-06-05 RX ADMIN — FENTANYL CITRATE 100 MCG: 0.05 INJECTION, SOLUTION INTRAMUSCULAR; INTRAVENOUS at 09:07

## 2023-06-05 RX ADMIN — HYDROMORPHONE HYDROCHLORIDE 0.5 MG: 1 INJECTION, SOLUTION INTRAMUSCULAR; INTRAVENOUS; SUBCUTANEOUS at 10:55

## 2023-06-05 RX ADMIN — DEXAMETHASONE SODIUM PHOSPHATE 8 MG: 4 INJECTION, SOLUTION INTRAMUSCULAR; INTRAVENOUS at 09:16

## 2023-06-05 RX ADMIN — OXYCODONE HYDROCHLORIDE AND ACETAMINOPHEN 1 TABLET: 5; 325 TABLET ORAL at 12:38

## 2023-06-05 RX ADMIN — GABAPENTIN 600 MG: 300 CAPSULE ORAL at 07:55

## 2023-06-05 RX ADMIN — FENTANYL CITRATE 50 MCG: 50 INJECTION, SOLUTION INTRAMUSCULAR; INTRAVENOUS at 10:35

## 2023-06-05 RX ADMIN — Medication 0.5 MG: at 10:55

## 2023-06-05 RX ADMIN — SODIUM CHLORIDE, POTASSIUM CHLORIDE, SODIUM LACTATE AND CALCIUM CHLORIDE 9 ML/HR: 600; 310; 30; 20 INJECTION, SOLUTION INTRAVENOUS at 11:11

## 2023-06-05 RX ADMIN — ONDANSETRON 4 MG: 2 INJECTION INTRAMUSCULAR; INTRAVENOUS at 09:58

## 2023-06-05 NOTE — ANESTHESIA POSTPROCEDURE EVALUATION
Patient: Zofia Ortiz    Procedure Summary       Date: 06/05/23 Room / Location:  [facility] [address] [place] [address] [facility]    Anesthesia Start: 0901 Anesthesia Stop:     Procedure: TOTAL LAPAROSCOPIC HYSTERECTOMY BILATERAL SALPINGECTOMY WITH DAVINCI ROBOT (Abdomen) Diagnosis:     Surgeons: Janine Mckay MD Provider: J Carlos Kolb MD    Anesthesia Type: general ASA Status: 2            Anesthesia Type: general    Vitals  Vitals Value Taken Time   /80 06/05/23 1020   Temp 97 °F (36.1 °C) 06/05/23 1020   Pulse 59 06/05/23 1021   Resp 20 06/05/23 1020   SpO2 100 % 06/05/23 1021   Vitals shown include unvalidated device data.        Post Anesthesia Care and Evaluation    Patient location during evaluation: PACU  Patient participation: complete - patient cannot participate  Level of consciousness: responsive to physical stimuli  Pain score: 0  Pain management: adequate    Airway patency: patent  Anesthetic complications: No anesthetic complications    Cardiovascular status: stable  Respiratory status: acceptable, nasal cannula, oral airway and spontaneous ventilation  Hydration status: stable    Comments: Pt transferred to PACU with O2. Vital signs stable. Report to PACU RN and care accepted.

## 2023-06-05 NOTE — INTERVAL H&P NOTE
[facility] Pre-op    Full history and physical note from office is attached.    VS: /95  HR 82  RR 16  T 97.2  sat 100%RA      LAB Results:  Lab Results   Component Value Date    WBC 6.71 05/22/2023    HGB 15.3 05/22/2023    HCT 46.2 05/22/2023    MCV 94.7 05/22/2023     05/22/2023    NEUTROABS 3.20 05/22/2023    GLUCOSE 102 (H) 02/03/2023    BUN 8 02/03/2023    CREATININE 0.63 02/03/2023    EGFRIFNONA 86 09/29/2021    EGFRIFAFRI 98 04/11/2019     02/03/2023    K 3.3 (L) 02/03/2023    CL 98 02/03/2023    CO2 29.0 02/03/2023    MG 1.8 09/29/2021    CALCIUM 9.4 02/03/2023    ALBUMIN 4.1 02/03/2023    AST 19 02/03/2023    ALT 13 02/03/2023    BILITOT 0.2 02/03/2023       Cancer Staging (if applicable)  Cancer Patient: __ yes __no __unknown__N/A; If yes, clinical stage T:__ N:__M:__, stage group or __N/A      Impression: Menorrhagia       Plan:     TOTAL LAPAROSCOPIC HYSTERECTOMY BILATERAL SALPINGECTOMY WITH DAVINCI ROBOT         ANSELMO Farfan   6/5/2023   07:52 EDT

## 2023-06-05 NOTE — OP NOTE
Hysterectomy Procedure Note      Pre-operative Diagnosis: Post endometrial ablation syndrome, pelvic pain    Post-operative Diagnosis: Post endometrial ablation syndrome, pelvic pain    Operation: Robotic Hysterectomy with bilateral salpingectomy    Surgeon: Janine Mckay MD     Assistants:  Assistant: Jatin Moralez RNFA was responsible for performing the following activities: Retraction, Suction, Irrigation, Suturing, Closing, and Placing Dressing and their skilled assistance was necessary for the success of this case.       Anesthesia: General endotracheal anesthesia    Findings: Enlarged uterus, Fibroid uterus, and Adhesions    Estimated Blood Loss:  Minimal, less than 25mL    Specimens: Uterus with cervix and tubes    Complications:  None    Disposition: PACU - hemodynamically stable.      Procedure Details    The patient was seen in the Holding Room. The risks, benefits, complications, treatment options, and expected outcomes were discussed with the patient. The patient concurred with the proposed plan, giving informed consent. The patient was identified as Zofia Ortiz and the procedure verified as robotic hysterectomy with bilateral salpingectomy. A Time Out was held and the above information confirmed.    After induction of anesthesia, the patient was prepped and draped in the usual sterile manner. The uterus was sounded and fit with a Cassie-Koh intrauterine manipulator.    The operators gloves were changed.  The supraumbilical area was injected with a dilute marcaine solution.  An 8mm supraumbilical incision was made. Intraperitoneal access was gained with the optical noncutting trocar under direct visualization. CO2 was used to insufflate the abdominopelvic cavity to a pressure of approximately 15 mm Hg. She was placed in maximum Trendelenburg. One additional 8mm port site was placed on the left and two additional 8mm ports sites were placed on the right.     The DaVinci system was docked  and the procedure continued at the console. The anatomy was inspected and adhesions of the bladder to the anterior abdominal wall were taken down with monopolar, sharp and blunt dissection. The ureters were identified bilaterally. The fallopian tube was dissected away from the ovary and the broad ligament with sharp and monopolar dissection.The left uteroovarian ligament was fulgurated and transected. The remainder of the broad ligament, down to and including the round ligament was fulgurated and transected. The bladder was dissected across the midline with monopolar, sharp and blunt dissection. The procedure was repeated on the right side. The uterine vessels on the left and right were isolated, fulgurated and transected. A circumferential incision was made along the cervicovaginal junction and the uterus was removed from the vagina. The vaginal cuff was then closed with 2-0 vicryl and 0 vicryl. There was good hemostasis at low pressure. The bladder was refilled with 300 mL sterile water and bladder integrity was observed.     The DaVinci system was undocked and the procedure was completed at the bedside. The trocars were removed. The skin incisions were closed with 3-0 plain and reinforced with skin glue. The counts were correct x 2. Patient was awakened and taken to recover in stable condition.       Janine Mckay MD  06/05/23  10:22 EDT

## 2023-06-05 NOTE — ANESTHESIA PREPROCEDURE EVALUATION
Anesthesia Evaluation     Patient summary reviewed and Nursing notes reviewed   no history of anesthetic complications:   NPO Solid Status: > 8 hours  NPO Liquid Status: > 8 hours           Airway   Mallampati: II  TM distance: >3 FB  Neck ROM: full  No difficulty expected  Dental      Pulmonary - normal exam   (+) ,shortness of breath, sleep apnea  Cardiovascular - negative cardio ROS and normal exam        Neuro/Psych  GI/Hepatic/Renal/Endo    (+) obesity, GERD    Musculoskeletal     Abdominal    Substance History      OB/GYN          Other                      Anesthesia Plan    ASA 2     general     intravenous induction     Anesthetic plan, risks, benefits, and alternatives have been provided, discussed and informed consent has been obtained with: patient.    Plan discussed with CRNA.    CODE STATUS:

## 2023-06-05 NOTE — ANESTHESIA PROCEDURE NOTES
Airway  Urgency: elective    Date/Time: 6/5/2023 9:09 AM  Airway not difficult    General Information and Staff    Patient location during procedure: OR  CRNA/CAA: Quiana Figueroa CRNA    Indications and Patient Condition  Indications for airway management: airway protection    Preoxygenated: yes  MILS maintained throughout  Mask difficulty assessment: 2 - vent by mask + OA or adjuvant +/- NMBA    Final Airway Details  Final airway type: endotracheal airway      Successful airway: ETT  Cuffed: yes   Successful intubation technique: direct laryngoscopy  Endotracheal tube insertion site: oral  Blade: Banuelos  Blade size: 2  ETT size (mm): 7.0  Cormack-Lehane Classification: grade I - full view of glottis  Placement verified by: chest auscultation and capnometry   Cuff volume (mL): 6  Measured from: lips  ETT/EBT  to lips (cm): 20  Number of attempts at approach: 1  Assessment: lips, teeth, and gum same as pre-op and atraumatic intubation    Additional Comments  Pt to OR 18. Pt moved self to OR table. ASA monitors placed. Pre-O2 with 100% Oxygen. SIVI. Atraumatic intubation. +ETCO2, +BBS.

## 2023-06-12 ENCOUNTER — HOSPITAL ENCOUNTER (INPATIENT)
Facility: HOSPITAL | Age: 43
LOS: 3 days | Discharge: HOME | End: 2023-06-15
Attending: EMERGENCY MEDICINE | Admitting: OBSTETRICS & GYNECOLOGY
Payer: COMMERCIAL

## 2023-06-12 ENCOUNTER — APPOINTMENT (OUTPATIENT)
Dept: CT IMAGING | Facility: HOSPITAL | Age: 43
End: 2023-06-12
Payer: COMMERCIAL

## 2023-06-12 DIAGNOSIS — R10.84 GENERALIZED ABDOMINAL PAIN: Primary | ICD-10-CM

## 2023-06-12 DIAGNOSIS — K65.8: ICD-10-CM

## 2023-06-12 DIAGNOSIS — Z98.890 RECENT MAJOR SURGERY: ICD-10-CM

## 2023-06-12 LAB
ALBUMIN SERPL-MCNC: 3.6 G/DL (ref 3.5–5.2)
ALBUMIN/GLOB SERPL: 1 G/DL
ALP SERPL-CCNC: 227 U/L (ref 39–117)
ALT SERPL W P-5'-P-CCNC: 13 U/L (ref 1–33)
ANION GAP SERPL CALCULATED.3IONS-SCNC: 19 MMOL/L (ref 5–15)
AST SERPL-CCNC: 21 U/L (ref 1–32)
BACTERIA UR QL AUTO: ABNORMAL /HPF
BASOPHILS # BLD AUTO: 0.05 10*3/MM3 (ref 0–0.2)
BASOPHILS NFR BLD AUTO: 0.5 % (ref 0–1.5)
BILIRUB SERPL-MCNC: 0.3 MG/DL (ref 0–1.2)
BILIRUB UR QL STRIP: NEGATIVE
BUN SERPL-MCNC: 56 MG/DL (ref 6–20)
BUN/CREAT SERPL: 7 (ref 7–25)
CALCIUM SPEC-SCNC: 8.9 MG/DL (ref 8.6–10.5)
CHLORIDE SERPL-SCNC: 99 MMOL/L (ref 98–107)
CLARITY UR: ABNORMAL
CO2 SERPL-SCNC: 21 MMOL/L (ref 22–29)
COLOR UR: YELLOW
CREAT SERPL-MCNC: 7.96 MG/DL (ref 0.57–1)
D-LACTATE SERPL-SCNC: 0.5 MMOL/L (ref 0.5–2)
DEPRECATED RDW RBC AUTO: 52.2 FL (ref 37–54)
EGFRCR SERPLBLD CKD-EPI 2021: 6 ML/MIN/1.73
EOSINOPHIL # BLD AUTO: 0.94 10*3/MM3 (ref 0–0.4)
EOSINOPHIL NFR BLD AUTO: 8.5 % (ref 0.3–6.2)
ERYTHROCYTE [DISTWIDTH] IN BLOOD BY AUTOMATED COUNT: 14.5 % (ref 12.3–15.4)
GLOBULIN UR ELPH-MCNC: 3.7 GM/DL
GLUCOSE SERPL-MCNC: 124 MG/DL (ref 65–99)
GLUCOSE UR STRIP-MCNC: ABNORMAL MG/DL
HCT VFR BLD AUTO: 41.4 % (ref 34–46.6)
HGB BLD-MCNC: 13.5 G/DL (ref 12–15.9)
HGB UR QL STRIP.AUTO: ABNORMAL
HOLD SPECIMEN: NORMAL
HYALINE CASTS UR QL AUTO: ABNORMAL /LPF
IMM GRANULOCYTES # BLD AUTO: 0.04 10*3/MM3 (ref 0–0.05)
IMM GRANULOCYTES NFR BLD AUTO: 0.4 % (ref 0–0.5)
INR PPP: 1.02 (ref 0.89–1.12)
KETONES UR QL STRIP: NEGATIVE
LEUKOCYTE ESTERASE UR QL STRIP.AUTO: ABNORMAL
LIPASE SERPL-CCNC: 12 U/L (ref 13–60)
LYMPHOCYTES # BLD AUTO: 0.78 10*3/MM3 (ref 0.7–3.1)
LYMPHOCYTES NFR BLD AUTO: 7 % (ref 19.6–45.3)
MCH RBC QN AUTO: 31.8 PG (ref 26.6–33)
MCHC RBC AUTO-ENTMCNC: 32.6 G/DL (ref 31.5–35.7)
MCV RBC AUTO: 97.4 FL (ref 79–97)
MONOCYTES # BLD AUTO: 0.74 10*3/MM3 (ref 0.1–0.9)
MONOCYTES NFR BLD AUTO: 6.7 % (ref 5–12)
NEUTROPHILS NFR BLD AUTO: 76.9 % (ref 42.7–76)
NEUTROPHILS NFR BLD AUTO: 8.52 10*3/MM3 (ref 1.7–7)
NITRITE UR QL STRIP: NEGATIVE
NRBC BLD AUTO-RTO: 0 /100 WBC (ref 0–0.2)
PH UR STRIP.AUTO: 7 [PH] (ref 5–8)
PLATELET # BLD AUTO: 432 10*3/MM3 (ref 140–450)
PMV BLD AUTO: 8.6 FL (ref 6–12)
POTASSIUM SERPL-SCNC: 4.9 MMOL/L (ref 3.5–5.2)
PROCALCITONIN SERPL-MCNC: 0.24 NG/ML (ref 0–0.25)
PROT SERPL-MCNC: 7.3 G/DL (ref 6–8.5)
PROT UR QL STRIP: ABNORMAL
PROTHROMBIN TIME: 13.5 SECONDS (ref 12.2–14.5)
RBC # BLD AUTO: 4.25 10*6/MM3 (ref 3.77–5.28)
RBC # UR STRIP: ABNORMAL /HPF
REF LAB TEST METHOD: ABNORMAL
SODIUM SERPL-SCNC: 139 MMOL/L (ref 136–145)
SP GR UR STRIP: 1.02 (ref 1–1.03)
SQUAMOUS #/AREA URNS HPF: ABNORMAL /HPF
UROBILINOGEN UR QL STRIP: ABNORMAL
WBC # UR STRIP: ABNORMAL /HPF
WBC NRBC COR # BLD: 11.07 10*3/MM3 (ref 3.4–10.8)
WHOLE BLOOD HOLD COAG: NORMAL
WHOLE BLOOD HOLD SPECIMEN: NORMAL

## 2023-06-12 PROCEDURE — 74177 CT ABD & PELVIS W/CONTRAST: CPT

## 2023-06-12 PROCEDURE — 87040 BLOOD CULTURE FOR BACTERIA: CPT | Performed by: EMERGENCY MEDICINE

## 2023-06-12 PROCEDURE — 85025 COMPLETE CBC W/AUTO DIFF WBC: CPT | Performed by: EMERGENCY MEDICINE

## 2023-06-12 PROCEDURE — 25010000002 LINEZOLID 600 MG/300ML SOLUTION: Performed by: OBSTETRICS & GYNECOLOGY

## 2023-06-12 PROCEDURE — 84300 ASSAY OF URINE SODIUM: CPT | Performed by: INTERNAL MEDICINE

## 2023-06-12 PROCEDURE — 63710000001 ONDANSETRON PER 8 MG: Performed by: OBSTETRICS & GYNECOLOGY

## 2023-06-12 PROCEDURE — 81001 URINALYSIS AUTO W/SCOPE: CPT | Performed by: EMERGENCY MEDICINE

## 2023-06-12 PROCEDURE — 84145 PROCALCITONIN (PCT): CPT | Performed by: EMERGENCY MEDICINE

## 2023-06-12 PROCEDURE — 85610 PROTHROMBIN TIME: CPT | Performed by: EMERGENCY MEDICINE

## 2023-06-12 PROCEDURE — 82570 ASSAY OF URINE CREATININE: CPT | Performed by: INTERNAL MEDICINE

## 2023-06-12 PROCEDURE — 25510000001 IOPAMIDOL 61 % SOLUTION: Performed by: EMERGENCY MEDICINE

## 2023-06-12 PROCEDURE — 83690 ASSAY OF LIPASE: CPT | Performed by: EMERGENCY MEDICINE

## 2023-06-12 PROCEDURE — 83605 ASSAY OF LACTIC ACID: CPT | Performed by: EMERGENCY MEDICINE

## 2023-06-12 PROCEDURE — 25010000002 ONDANSETRON PER 1 MG: Performed by: EMERGENCY MEDICINE

## 2023-06-12 PROCEDURE — 25010000002 HYDROMORPHONE PER 4 MG: Performed by: OBSTETRICS & GYNECOLOGY

## 2023-06-12 PROCEDURE — 25010000002 PIPERACILLIN SOD-TAZOBACTAM PER 1 G: Performed by: EMERGENCY MEDICINE

## 2023-06-12 PROCEDURE — 80053 COMPREHEN METABOLIC PANEL: CPT | Performed by: EMERGENCY MEDICINE

## 2023-06-12 PROCEDURE — 25010000002 HYDROMORPHONE PER 4 MG: Performed by: EMERGENCY MEDICINE

## 2023-06-12 RX ORDER — SODIUM CHLORIDE 0.9 % (FLUSH) 0.9 %
10 SYRINGE (ML) INJECTION EVERY 12 HOURS SCHEDULED
Status: DISCONTINUED | OUTPATIENT
Start: 2023-06-12 | End: 2023-06-15 | Stop reason: HOSPADM

## 2023-06-12 RX ORDER — ONDANSETRON 2 MG/ML
4 INJECTION INTRAMUSCULAR; INTRAVENOUS EVERY 6 HOURS PRN
Status: DISCONTINUED | OUTPATIENT
Start: 2023-06-12 | End: 2023-06-15 | Stop reason: HOSPADM

## 2023-06-12 RX ORDER — SODIUM CHLORIDE, SODIUM LACTATE, POTASSIUM CHLORIDE, CALCIUM CHLORIDE 600; 310; 30; 20 MG/100ML; MG/100ML; MG/100ML; MG/100ML
25 INJECTION, SOLUTION INTRAVENOUS CONTINUOUS
Status: DISCONTINUED | OUTPATIENT
Start: 2023-06-13 | End: 2023-06-13

## 2023-06-12 RX ORDER — LINEZOLID 2 MG/ML
600 INJECTION, SOLUTION INTRAVENOUS EVERY 12 HOURS
Status: COMPLETED | OUTPATIENT
Start: 2023-06-12 | End: 2023-06-15

## 2023-06-12 RX ORDER — HYDROMORPHONE HYDROCHLORIDE 1 MG/ML
0.5 INJECTION, SOLUTION INTRAMUSCULAR; INTRAVENOUS; SUBCUTANEOUS
Status: DISCONTINUED | OUTPATIENT
Start: 2023-06-12 | End: 2023-06-12 | Stop reason: SDUPTHER

## 2023-06-12 RX ORDER — ONDANSETRON 4 MG/1
4 TABLET, FILM COATED ORAL EVERY 6 HOURS PRN
Status: DISCONTINUED | OUTPATIENT
Start: 2023-06-12 | End: 2023-06-15 | Stop reason: HOSPADM

## 2023-06-12 RX ORDER — OXYCODONE AND ACETAMINOPHEN 10; 325 MG/1; MG/1
1 TABLET ORAL EVERY 4 HOURS PRN
Status: DISCONTINUED | OUTPATIENT
Start: 2023-06-12 | End: 2023-06-15 | Stop reason: HOSPADM

## 2023-06-12 RX ORDER — SODIUM CHLORIDE 0.9 % (FLUSH) 0.9 %
10 SYRINGE (ML) INJECTION AS NEEDED
Status: DISCONTINUED | OUTPATIENT
Start: 2023-06-12 | End: 2023-06-15 | Stop reason: HOSPADM

## 2023-06-12 RX ORDER — HYDROMORPHONE HYDROCHLORIDE 1 MG/ML
0.5 INJECTION, SOLUTION INTRAMUSCULAR; INTRAVENOUS; SUBCUTANEOUS
Status: COMPLETED | OUTPATIENT
Start: 2023-06-12 | End: 2023-06-12

## 2023-06-12 RX ORDER — POLYETHYLENE GLYCOL 3350 17 G/17G
17 POWDER, FOR SOLUTION ORAL DAILY
Status: DISCONTINUED | OUTPATIENT
Start: 2023-06-13 | End: 2023-06-15 | Stop reason: HOSPADM

## 2023-06-12 RX ORDER — ALPRAZOLAM 0.25 MG/1
0.25 TABLET ORAL 3 TIMES DAILY PRN
Status: DISCONTINUED | OUTPATIENT
Start: 2023-06-12 | End: 2023-06-15 | Stop reason: HOSPADM

## 2023-06-12 RX ORDER — SODIUM CHLORIDE 9 MG/ML
40 INJECTION, SOLUTION INTRAVENOUS AS NEEDED
Status: DISCONTINUED | OUTPATIENT
Start: 2023-06-12 | End: 2023-06-15 | Stop reason: HOSPADM

## 2023-06-12 RX ORDER — LINEZOLID 2 MG/ML
600 INJECTION, SOLUTION INTRAVENOUS ONCE
Status: DISCONTINUED | OUTPATIENT
Start: 2023-06-12 | End: 2023-06-12 | Stop reason: SDUPTHER

## 2023-06-12 RX ORDER — ACETAMINOPHEN 325 MG/1
650 TABLET ORAL EVERY 4 HOURS PRN
Status: DISCONTINUED | OUTPATIENT
Start: 2023-06-12 | End: 2023-06-15 | Stop reason: HOSPADM

## 2023-06-12 RX ORDER — OXYCODONE HYDROCHLORIDE 5 MG/1
5 TABLET ORAL EVERY 4 HOURS PRN
Status: DISCONTINUED | OUTPATIENT
Start: 2023-06-12 | End: 2023-06-15 | Stop reason: HOSPADM

## 2023-06-12 RX ORDER — FLUOXETINE 10 MG/1
10 CAPSULE ORAL DAILY
Status: DISCONTINUED | OUTPATIENT
Start: 2023-06-13 | End: 2023-06-15 | Stop reason: HOSPADM

## 2023-06-12 RX ORDER — ONDANSETRON 2 MG/ML
4 INJECTION INTRAMUSCULAR; INTRAVENOUS ONCE
Status: COMPLETED | OUTPATIENT
Start: 2023-06-12 | End: 2023-06-12

## 2023-06-12 RX ADMIN — HYDROMORPHONE HYDROCHLORIDE 0.5 MG: 1 INJECTION, SOLUTION INTRAMUSCULAR; INTRAVENOUS; SUBCUTANEOUS at 21:30

## 2023-06-12 RX ADMIN — SODIUM CHLORIDE 1000 ML: 9 INJECTION, SOLUTION INTRAVENOUS at 19:17

## 2023-06-12 RX ADMIN — ONDANSETRON HYDROCHLORIDE 4 MG: 4 TABLET, FILM COATED ORAL at 23:06

## 2023-06-12 RX ADMIN — IOPAMIDOL 100 ML: 612 INJECTION, SOLUTION INTRAVENOUS at 17:23

## 2023-06-12 RX ADMIN — ONDANSETRON 4 MG: 2 INJECTION INTRAMUSCULAR; INTRAVENOUS at 19:17

## 2023-06-12 RX ADMIN — HYDROMORPHONE HYDROCHLORIDE 0.5 MG: 1 INJECTION, SOLUTION INTRAMUSCULAR; INTRAVENOUS; SUBCUTANEOUS at 19:17

## 2023-06-12 RX ADMIN — Medication 10 ML: at 22:59

## 2023-06-12 RX ADMIN — OXYCODONE HYDROCHLORIDE AND ACETAMINOPHEN 1 TABLET: 10; 325 TABLET ORAL at 23:06

## 2023-06-12 RX ADMIN — ALPRAZOLAM 0.25 MG: 0.25 TABLET ORAL at 23:24

## 2023-06-12 RX ADMIN — HYDROMORPHONE HYDROCHLORIDE 0.5 MG: 1 INJECTION, SOLUTION INTRAMUSCULAR; INTRAVENOUS; SUBCUTANEOUS at 19:42

## 2023-06-12 RX ADMIN — TAZOBACTAM SODIUM AND PIPERACILLIN SODIUM 3.38 G: 375; 3 INJECTION, SOLUTION INTRAVENOUS at 19:42

## 2023-06-12 RX ADMIN — LINEZOLID 600 MG: 600 INJECTION, SOLUTION INTRAVENOUS at 21:33

## 2023-06-12 NOTE — ED PROVIDER NOTES
Subjective   History of Present Illness    Review of Systems    Past Medical History:   Diagnosis Date    Abnormal glucose     Body piercing     EARS ONLY    Dyspepsia     rare heartburn sx's, serum h. pyl neg    Dyspnea on exertion     Elevated BP     156/106 @ Intake, denies hx HTN.      Elevated transaminase level     Fatigue     Former smoker     Vapes now    GERD (gastroesophageal reflux disease)     Morbid obesity      (normal spontaneous vaginal delivery)     x 1 w/o complic    Sleep apnea     newly dx, but suspects she has always had it    Sleep apnea, obstructive 2018    recent sleep stuady change to bipap    Tattoos     NECK, LEFT THIGH, RIGHT ANKLE    Vitamin D deficiency        No Known Allergies    Past Surgical History:   Procedure Laterality Date    ABDOMINOPLASTY N/A 2019    Procedure: ABDOMINOPLASTY;  Surgeon: Abisai Thurman MD;  Location:  [facility];  Service: Plastics     SECTION WITH TUBAL  2007    CYSTECTOMY      ORAL    ENDOSCOPY N/A 2018    Procedure: ESOPHAGOGASTRODUODENOSCOPY;  Surgeon: Glen Blackmon MD;  Location:  [facility];  Service: Bariatric    GASTRIC SLEEVE LAPAROSCOPIC N/A 2018    Procedure: GASTRIC SLEEVE LAPAROSCOPIC;  Surgeon: Glen Blackmon MD;  Location:  [facility];  Service: Bariatric    KNEE ARTHROSCOPY Right 2013    MASTOPEXY Bilateral 2019    Procedure: BREAST LIFT BILATERAL;  Surgeon: Abisai Thurman MD;  Location:  [facility];  Service: Plastics    TOTAL LAPAROSCOPIC HYSTERECTOMY SALPINGECTOMY N/A 2023    Procedure: TOTAL LAPAROSCOPIC HYSTERECTOMY BILATERAL SALPINGECTOMY WITH DAVINCI ROBOT;  Surgeon: Janine Mckay MD;  Location:  [facility];  Service: Robotics - DaVinci;  Laterality: N/A;    WISDOM TOOTH EXTRACTION      ALL FOUR       Family History   Problem Relation Age of Onset    Hypertension Mother     No Known Problems Father     No Known Problems Sister     Obesity Maternal Grandmother     Hypertension  Maternal Grandmother     Diabetes Maternal Grandmother     Melanoma Maternal Grandfather        Social History     Socioeconomic History    Marital status:      Spouse name: Bruce Ortiz    Number of children: 2    Years of education: Bachelor's   Tobacco Use    Smoking status: Former     Packs/day: 0.50     Years: 8.00     Pack years: 4.00     Types: Cigarettes     Quit date: 2013     Years since quitting: 10.4    Smokeless tobacco: Never   Vaping Use    Vaping Use: Never used   Substance and Sexual Activity    Alcohol use: Not Currently     Comment: Occasional    Drug use: Not Currently     Types: Marijuana     Comment: 3-5 a month to sleep    Sexual activity: Defer     Partners: Male     Comment:             Objective   Physical Exam    Procedures           ED Course  ED Course as of 06/12/23 1619   Mon Jun 12, 2023   9073 I talked with Dr. Mckay prior to arrival.  Patient is 1 week status post hysterectomy.  Patient with abdominal fullness with difficulty with urination and stooling.  Concern for postoperative complication.  Dr. Mckay would like to receive a call back after completion of the work-up. [RS]      ED Course User Index  [RS] Jonathan Cabral MD                                           Medical Decision Making  Amount and/or Complexity of Data Reviewed  Labs: ordered.  Radiology: ordered.    Risk  Prescription drug management.        Final diagnoses:   None       ED Disposition  ED Disposition       None            No follow-up provider specified.       Medication List      No changes were made to your prescriptions during this visit.          sounds are normal. There is distension.      Tenderness: There is abdominal tenderness (diffuse).   Musculoskeletal:         General: No swelling or deformity. Normal range of motion.      Cervical back: Normal range of motion. Rigidity present.   Skin:     General: Skin is warm and dry.      Capillary Refill: Capillary refill takes less than 2 seconds.   Neurological:      General: No focal deficit present.      Mental Status: She is alert and oriented to person, place, and time.   Psychiatric:         Behavior: Behavior normal.         Thought Content: Thought content normal.       Procedures           ED Course  ED Course as of 06/12/23 1619   Mon Jun 12, 2023   1533 I talked with Dr. Mckay prior to arrival.  Patient is 1 week status post hysterectomy.  Patient with abdominal fullness with difficulty with urination and stooling.  Concern for postoperative complication.  Dr. Mckay would like to receive a call back after completion of the work-up. [RS]      ED Course User Index  [RS] Jonathan Cabral MD               CT Abdomen Pelvis With Contrast    Result Date: 6/12/2023  CT ABDOMEN PELVIS W CONTRAST Date of Exam: 6/12/2023 5:01 PM EDT Indication: Status post hysterectomy, abdominal pain. Comparison: 2/3/2023. Technique: Axial CT images were obtained of the abdomen and pelvis following the uneventful intravenous administration of 98 cc of Isovue-370 contrast. Reconstructed coronal and sagittal images were also obtained. Automated exposure control and iterative  construction methods were used. Findings: There is mild abdominal and moderate pelvic ascites. There appears to be enhancement of the peritoneum. This raises suspicion of possible peritonitis. In addition, several of small bowel loops demonstrate wall thickening, especially in the left aspect of  the abdomen which may represent a reactive enteritis. There is no pneumatosis or free air. The patient has had previous gastric sleeve surgery. The appendix  is normal. There is strandy density throughout the extra-abdominal and extrapelvic fat consistent with anasarca. The liver, gallbladder, kidneys, pancreas, adrenal glands, and spleen are normal. The uterus is surgically absent. The urinary bladder is normal. There are a few atherosclerotic vascular calcifications in the abdominal aorta. There is mild dependent atelectasis in the lung bases. There are no suspicious osteolytic or sclerotic lesions within the bony structures.     Impression: 1. Mild abdominal and moderate pelvic ascites with enhancement of the peritoneum. This raises suspicion of possible peritonitis. 2. Several the small bowel loops demonstrate wall thickening especially in the left aspect of the abdomen which may represent a reactive enteritis. 3. The appendix is normal. 4. Anasarca. 5. Previous gastric sleeve surgery. 6. Status post hysterectomy. Electronically Signed: Jacob Mac  6/12/2023 6:16 PM EDT  Workstation ID: [id number]    Duplex Venous Lower Extremity - Right CAR    Result Date: 6/24/2023    Normal right lower extremity venous duplex scan.   Incidentally demonstrated enlarged groin lymph nodes bilaterally                                       Medical Decision Making  Case discussed with OBGYN, Dr. Mckay, and urology, Dr. Canales.  Presentation is consistent with ureter injury and urine leaking to the peritoneal cavity.  Pt will be admitted for further evaluation and management.    Problems Addressed:  JESSICA (acute kidney injury): complicated acute illness or injury  Generalized abdominal pain: complicated acute illness or injury  Peritonitis due to urine: complicated acute illness or injury  Recent major surgery: complicated acute illness or injury    Amount and/or Complexity of Data Reviewed  External Data Reviewed: notes.     Details: Recent Procedure note  Labs: ordered. Decision-making details documented in ED Course.  Radiology: ordered and independent interpretation performed.  Decision-making details documented in ED Course.    Risk  Prescription drug management.  Decision regarding hospitalization.        Final diagnoses:   Generalized abdominal pain   Recent major surgery   Peritonitis due to urine   JESSICA (acute kidney injury)       ED Disposition  ED Disposition       ED Disposition   Decision to Admit    Condition   --    Comment   Level of Care: Med/Surg [1]   Diagnosis: Generalized abdominal pain [368178]   Admitting Physician: ECTOR GAONA [[id number]]   Certification: I Certify That Inpatient Hospital Services Are Medically Necessary For Greater Than 2 Midnights                  Anish Sung DO  07/10/23 0633       Anish Sung DO  07/10/23 0634

## 2023-06-13 ENCOUNTER — APPOINTMENT (OUTPATIENT)
Dept: GENERAL RADIOLOGY | Facility: HOSPITAL | Age: 43
End: 2023-06-13
Payer: COMMERCIAL

## 2023-06-13 ENCOUNTER — ANESTHESIA (OUTPATIENT)
Dept: PERIOP | Facility: HOSPITAL | Age: 43
End: 2023-06-13
Payer: COMMERCIAL

## 2023-06-13 ENCOUNTER — ANESTHESIA EVENT (OUTPATIENT)
Dept: PERIOP | Facility: HOSPITAL | Age: 43
End: 2023-06-13
Payer: COMMERCIAL

## 2023-06-13 LAB
ANION GAP SERPL CALCULATED.3IONS-SCNC: 9 MMOL/L (ref 5–15)
BASOPHILS # BLD AUTO: 0.02 10*3/MM3 (ref 0–0.2)
BASOPHILS NFR BLD AUTO: 0.2 % (ref 0–1.5)
BUN SERPL-MCNC: 37 MG/DL (ref 6–20)
BUN/CREAT SERPL: 9.4 (ref 7–25)
CALCIUM SPEC-SCNC: 8.8 MG/DL (ref 8.6–10.5)
CHLORIDE SERPL-SCNC: 105 MMOL/L (ref 98–107)
CO2 SERPL-SCNC: 24 MMOL/L (ref 22–29)
CREAT SERPL-MCNC: 3.94 MG/DL (ref 0.57–1)
CREAT UR-MCNC: 49 MG/DL
DEPRECATED RDW RBC AUTO: 51.3 FL (ref 37–54)
EGFRCR SERPLBLD CKD-EPI 2021: 13.9 ML/MIN/1.73
EOSINOPHIL # BLD AUTO: 0.33 10*3/MM3 (ref 0–0.4)
EOSINOPHIL NFR BLD AUTO: 3.8 % (ref 0.3–6.2)
ERYTHROCYTE [DISTWIDTH] IN BLOOD BY AUTOMATED COUNT: 14.5 % (ref 12.3–15.4)
GLUCOSE SERPL-MCNC: 122 MG/DL (ref 65–99)
HCT VFR BLD AUTO: 36.3 % (ref 34–46.6)
HGB BLD-MCNC: 11.9 G/DL (ref 12–15.9)
IMM GRANULOCYTES # BLD AUTO: 0.03 10*3/MM3 (ref 0–0.05)
IMM GRANULOCYTES NFR BLD AUTO: 0.3 % (ref 0–0.5)
LYMPHOCYTES # BLD AUTO: 0.29 10*3/MM3 (ref 0.7–3.1)
LYMPHOCYTES NFR BLD AUTO: 3.3 % (ref 19.6–45.3)
MCH RBC QN AUTO: 31.7 PG (ref 26.6–33)
MCHC RBC AUTO-ENTMCNC: 32.8 G/DL (ref 31.5–35.7)
MCV RBC AUTO: 96.8 FL (ref 79–97)
MONOCYTES # BLD AUTO: 0.13 10*3/MM3 (ref 0.1–0.9)
MONOCYTES NFR BLD AUTO: 1.5 % (ref 5–12)
NEUTROPHILS NFR BLD AUTO: 8 10*3/MM3 (ref 1.7–7)
NEUTROPHILS NFR BLD AUTO: 90.9 % (ref 42.7–76)
NRBC BLD AUTO-RTO: 0 /100 WBC (ref 0–0.2)
PLATELET # BLD AUTO: 426 10*3/MM3 (ref 140–450)
PMV BLD AUTO: 8.4 FL (ref 6–12)
POTASSIUM SERPL-SCNC: 5.3 MMOL/L (ref 3.5–5.2)
RBC # BLD AUTO: 3.75 10*6/MM3 (ref 3.77–5.28)
SODIUM SERPL-SCNC: 138 MMOL/L (ref 136–145)
SODIUM UR-SCNC: 105 MMOL/L
WBC NRBC COR # BLD: 8.8 10*3/MM3 (ref 3.4–10.8)

## 2023-06-13 PROCEDURE — 85025 COMPLETE CBC W/AUTO DIFF WBC: CPT | Performed by: OBSTETRICS & GYNECOLOGY

## 2023-06-13 PROCEDURE — 25010000002 DEXAMETHASONE PER 1 MG: Performed by: NURSE ANESTHETIST, CERTIFIED REGISTERED

## 2023-06-13 PROCEDURE — 25010000002 PIPERACILLIN SOD-TAZOBACTAM PER 1 G: Performed by: OBSTETRICS & GYNECOLOGY

## 2023-06-13 PROCEDURE — 25010000002 PROPOFOL 10 MG/ML EMULSION: Performed by: NURSE ANESTHETIST, CERTIFIED REGISTERED

## 2023-06-13 PROCEDURE — 25010000002 LINEZOLID 600 MG/300ML SOLUTION: Performed by: OBSTETRICS & GYNECOLOGY

## 2023-06-13 PROCEDURE — 76000 FLUOROSCOPY <1 HR PHYS/QHP: CPT

## 2023-06-13 PROCEDURE — 80048 BASIC METABOLIC PNL TOTAL CA: CPT | Performed by: OBSTETRICS & GYNECOLOGY

## 2023-06-13 PROCEDURE — 25010000002 FENTANYL CITRATE (PF) 100 MCG/2ML SOLUTION: Performed by: NURSE ANESTHETIST, CERTIFIED REGISTERED

## 2023-06-13 PROCEDURE — 25010000002 ONDANSETRON PER 1 MG: Performed by: NURSE ANESTHETIST, CERTIFIED REGISTERED

## 2023-06-13 PROCEDURE — 25010000002 LINEZOLID 600 MG/300ML SOLUTION: Performed by: UROLOGY

## 2023-06-13 PROCEDURE — C1758 CATHETER, URETERAL: HCPCS | Performed by: UROLOGY

## 2023-06-13 PROCEDURE — 25510000001 IOPAMIDOL 61 % SOLUTION: Performed by: UROLOGY

## 2023-06-13 RX ORDER — PROPOFOL 10 MG/ML
VIAL (ML) INTRAVENOUS AS NEEDED
Status: DISCONTINUED | OUTPATIENT
Start: 2023-06-13 | End: 2023-06-13 | Stop reason: SURG

## 2023-06-13 RX ORDER — FENTANYL CITRATE 50 UG/ML
50 INJECTION, SOLUTION INTRAMUSCULAR; INTRAVENOUS
Status: DISCONTINUED | OUTPATIENT
Start: 2023-06-13 | End: 2023-06-13 | Stop reason: HOSPADM

## 2023-06-13 RX ORDER — FAMOTIDINE 20 MG/1
20 TABLET, FILM COATED ORAL
Status: COMPLETED | OUTPATIENT
Start: 2023-06-13 | End: 2023-06-13

## 2023-06-13 RX ORDER — SODIUM CHLORIDE 9 MG/ML
40 INJECTION, SOLUTION INTRAVENOUS AS NEEDED
Status: DISCONTINUED | OUTPATIENT
Start: 2023-06-13 | End: 2023-06-13 | Stop reason: HOSPADM

## 2023-06-13 RX ORDER — ONDANSETRON 2 MG/ML
INJECTION INTRAMUSCULAR; INTRAVENOUS AS NEEDED
Status: DISCONTINUED | OUTPATIENT
Start: 2023-06-13 | End: 2023-06-13 | Stop reason: SURG

## 2023-06-13 RX ORDER — MAGNESIUM HYDROXIDE 1200 MG/15ML
LIQUID ORAL AS NEEDED
Status: DISCONTINUED | OUTPATIENT
Start: 2023-06-13 | End: 2023-06-13 | Stop reason: HOSPADM

## 2023-06-13 RX ORDER — LIDOCAINE HYDROCHLORIDE 20 MG/ML
JELLY TOPICAL AS NEEDED
Status: DISCONTINUED | OUTPATIENT
Start: 2023-06-13 | End: 2023-06-13 | Stop reason: HOSPADM

## 2023-06-13 RX ORDER — ALBUTEROL SULFATE 2.5 MG/3ML
2.5 SOLUTION RESPIRATORY (INHALATION) ONCE AS NEEDED
Status: DISCONTINUED | OUTPATIENT
Start: 2023-06-13 | End: 2023-06-13 | Stop reason: HOSPADM

## 2023-06-13 RX ORDER — DEXAMETHASONE SODIUM PHOSPHATE 4 MG/ML
INJECTION, SOLUTION INTRA-ARTICULAR; INTRALESIONAL; INTRAMUSCULAR; INTRAVENOUS; SOFT TISSUE AS NEEDED
Status: DISCONTINUED | OUTPATIENT
Start: 2023-06-13 | End: 2023-06-13 | Stop reason: SURG

## 2023-06-13 RX ORDER — FENTANYL CITRATE 50 UG/ML
INJECTION, SOLUTION INTRAMUSCULAR; INTRAVENOUS AS NEEDED
Status: DISCONTINUED | OUTPATIENT
Start: 2023-06-13 | End: 2023-06-13 | Stop reason: SURG

## 2023-06-13 RX ORDER — SODIUM CHLORIDE 9 MG/ML
9 INJECTION, SOLUTION INTRAVENOUS CONTINUOUS PRN
Status: DISCONTINUED | OUTPATIENT
Start: 2023-06-13 | End: 2023-06-15 | Stop reason: HOSPADM

## 2023-06-13 RX ORDER — LIDOCAINE HYDROCHLORIDE 10 MG/ML
INJECTION, SOLUTION EPIDURAL; INFILTRATION; INTRACAUDAL; PERINEURAL AS NEEDED
Status: DISCONTINUED | OUTPATIENT
Start: 2023-06-13 | End: 2023-06-13 | Stop reason: SURG

## 2023-06-13 RX ORDER — SODIUM CHLORIDE 0.9 % (FLUSH) 0.9 %
10 SYRINGE (ML) INJECTION EVERY 12 HOURS SCHEDULED
Status: DISCONTINUED | OUTPATIENT
Start: 2023-06-13 | End: 2023-06-13 | Stop reason: HOSPADM

## 2023-06-13 RX ORDER — SODIUM CHLORIDE 0.9 % (FLUSH) 0.9 %
10 SYRINGE (ML) INJECTION AS NEEDED
Status: DISCONTINUED | OUTPATIENT
Start: 2023-06-13 | End: 2023-06-13 | Stop reason: HOSPADM

## 2023-06-13 RX ADMIN — TAZOBACTAM SODIUM AND PIPERACILLIN SODIUM 3.38 G: 375; 3 INJECTION, SOLUTION INTRAVENOUS at 01:57

## 2023-06-13 RX ADMIN — Medication 10 ML: at 14:31

## 2023-06-13 RX ADMIN — TAZOBACTAM SODIUM AND PIPERACILLIN SODIUM 3.38 G: 375; 3 INJECTION, SOLUTION INTRAVENOUS at 14:55

## 2023-06-13 RX ADMIN — FENTANYL CITRATE 100 MCG: 0.05 INJECTION, SOLUTION INTRAMUSCULAR; INTRAVENOUS at 15:14

## 2023-06-13 RX ADMIN — OXYCODONE 5 MG: 5 TABLET ORAL at 18:33

## 2023-06-13 RX ADMIN — LIDOCAINE HYDROCHLORIDE 50 MG: 10 INJECTION, SOLUTION EPIDURAL; INFILTRATION; INTRACAUDAL; PERINEURAL at 15:14

## 2023-06-13 RX ADMIN — FLUOXETINE 10 MG: 10 CAPSULE ORAL at 08:50

## 2023-06-13 RX ADMIN — LINEZOLID 600 MG: 600 INJECTION, SOLUTION INTRAVENOUS at 10:25

## 2023-06-13 RX ADMIN — PROPOFOL 150 MG: 10 INJECTION, EMULSION INTRAVENOUS at 15:14

## 2023-06-13 RX ADMIN — OXYCODONE 5 MG: 5 TABLET ORAL at 08:50

## 2023-06-13 RX ADMIN — DEXAMETHASONE SODIUM PHOSPHATE 8 MG: 4 INJECTION, SOLUTION INTRAMUSCULAR; INTRAVENOUS at 15:17

## 2023-06-13 RX ADMIN — ALPRAZOLAM 0.25 MG: 0.25 TABLET ORAL at 12:08

## 2023-06-13 RX ADMIN — SODIUM CHLORIDE: 9 INJECTION, SOLUTION INTRAVENOUS at 15:12

## 2023-06-13 RX ADMIN — OXYCODONE HYDROCHLORIDE AND ACETAMINOPHEN 1 TABLET: 10; 325 TABLET ORAL at 23:00

## 2023-06-13 RX ADMIN — OXYCODONE 5 MG: 5 TABLET ORAL at 02:13

## 2023-06-13 RX ADMIN — LINEZOLID 600 MG: 600 INJECTION, SOLUTION INTRAVENOUS at 21:28

## 2023-06-13 RX ADMIN — TAZOBACTAM SODIUM AND PIPERACILLIN SODIUM 3.38 G: 375; 3 INJECTION, SOLUTION INTRAVENOUS at 15:16

## 2023-06-13 RX ADMIN — SODIUM CHLORIDE, POTASSIUM CHLORIDE, SODIUM LACTATE AND CALCIUM CHLORIDE 75 ML/HR: 600; 310; 30; 20 INJECTION, SOLUTION INTRAVENOUS at 00:23

## 2023-06-13 RX ADMIN — OXYCODONE HYDROCHLORIDE AND ACETAMINOPHEN 1 TABLET: 10; 325 TABLET ORAL at 12:08

## 2023-06-13 RX ADMIN — LIDOCAINE HYDROCHLORIDE 50 MG: 10 INJECTION, SOLUTION EPIDURAL; INFILTRATION; INTRACAUDAL; PERINEURAL at 15:17

## 2023-06-13 RX ADMIN — Medication 10 ML: at 21:29

## 2023-06-13 RX ADMIN — POLYETHYLENE GLYCOL 3350 17 G: 17 POWDER, FOR SOLUTION ORAL at 08:49

## 2023-06-13 RX ADMIN — OXYCODONE HYDROCHLORIDE AND ACETAMINOPHEN 1 TABLET: 10; 325 TABLET ORAL at 06:30

## 2023-06-13 RX ADMIN — FAMOTIDINE 20 MG: 20 TABLET ORAL at 14:31

## 2023-06-13 RX ADMIN — ONDANSETRON 4 MG: 2 INJECTION INTRAMUSCULAR; INTRAVENOUS at 15:32

## 2023-06-13 RX ADMIN — ALPRAZOLAM 0.25 MG: 0.25 TABLET ORAL at 21:28

## 2023-06-13 NOTE — ANESTHESIA POSTPROCEDURE EVALUATION
Patient: Zofia Ortiz    Procedure Summary       Date: 06/13/23 Room / Location:  [facility]    Anesthesia Start: 1512 Anesthesia Stop: 1544    Procedure: CYSTOSCOPY, BILATERAL RETROGRADE PYELOGRAM (Bilateral: Bladder) Diagnosis:     Surgeons: Antonio Canales MD Provider: Jesús Wilks MD    Anesthesia Type: general ASA Status: 3            Anesthesia Type: general    Vitals  Vitals Value Taken Time   BP     Temp     Pulse 73 06/13/23 1543   Resp     SpO2 90 % 06/13/23 1543   Vitals shown include unvalidated device data.        Post Anesthesia Care and Evaluation    Patient location during evaluation: PACU  Patient participation: complete - patient participated  Level of consciousness: awake  Pain score: 0  Pain management: adequate    Airway patency: patent  Anesthetic complications: No anesthetic complications  PONV Status: none  Cardiovascular status: acceptable and stable  Respiratory status: nasal cannula, unassisted, acceptable and spontaneous ventilation  Hydration status: acceptable

## 2023-06-13 NOTE — PROGRESS NOTES
6/13/2023    Name:Zofia Ortiz    MR#:[id number]     PROGRESS NOTE:  Post-Op      Subjective   Patient is s/p cystoscopy with bilateral retrograde pyelogram today with findings consistent with bladder injury.  She is currently alert and resting well.     Objective    Vitals  Temp:  Temp:  [96.8 °F (36 °C)-98.2 °F (36.8 °C)] 98.2 °F (36.8 °C)  Temp src: Temporal  BP:  BP: ()/(56-85) 97/71  Pulse:  Heart Rate:  [54-99] 63  RR:   Resp:  [16-18] 16    General Awake, alert, no distress.  Abdomen Soft, appropriately tender.  She is less distended today.   Incisions Intact, no erythema or exudate      I/O last 3 completed shifts:  In: 461 [I.V.:461]  Out: 5300 [Urine:5300]          Assessment   1.  Day of surgery, cystoscopy with bilateral retrograde pyelogram.   2.  Post op day 8, robotic hysterectomy.   3.  Bladder injury with urinary ascites.    Plan:   Discussed findings and general management. Questions answered.  Continue indwelling catheter.    Serial electrolytes and creatinine.  Reassess need for prophylactic antibiotic with Urology tomorrow.             Janine Mckay MD  6/13/2023 18:08 EDT

## 2023-06-13 NOTE — OP NOTE
Preop diagnosis: Urinary ascites  Postoperative diagnosis same, secondary to bladder injury  Procedure performed cystoscopy with bilateral retrograde pyelograms  Surgeon: Parker  Anesthesia: General  Indications: Zofia is a 43-year-old white female who is 8 days postop robotic laparoscopic hysterectomy with salpingectomy.  She has been complaining of weeklong history of bloating and oliguria and severe weight gain (greater than 20 pounds) she was admitted yesterday after being seen by Dr. Mckay in the office and CT scan shows a probable urinary ascites.  Upper tracts appeared normal.  Operative description: The patient was placed on the operating table in the dorsolithotomy position.  General endotracheal anesthesia was administered.  A Mckenzie catheter was removed and her groin was prepped and draped in usual sterile fashion.  The 22 Polish Stortz panendoscope was inserted under video cystoscopy.  The urethra was inspected and noted be normal.  The ureteral orifices were in their normal location and appeared to be effluxing clear urine.  The bladder was inspected circumferentially with a 30 and 70 degree lenses.  On just to the right of the midline dome of the bladder was a Smitley 1 cm longitudinal laceration of the bladder with slight surrounding erythema.  No suture or feculent material was identified.    I then cannulated each orifice with a 5 Polish Pollick catheter and performed bilateral retrograde pyelograms.  Both of these were normal with no extravasation hydronephrosis filling defect stones or other abnormalities.  At this point it was clear that the diagnosis was a bladder perforation at the dome to the right of the midline.  I removed the scope atraumatically and inserted a 20 Polish Mckenzie catheter after injecting with 2% Xylocaine anesthesia gel.  She was awakened and taken the recovery room in stable condition.  There were no complications.  Estimated blood loss none  The hospital has no dictation  system.  This was done a voice recognition system.  There may be significant transcription errors.

## 2023-06-13 NOTE — CONSULTS
Patient Care Team:  Morgan Arguello MD as PCP - General (Family Medicine)  Morgan Arguello MD as Referring Physician (Family Medicine)    Chief complaint: Abdominal distension   Abdominal pain   Acute kidney injury     History of Present Illness: Zofia Ortiz is a 43 y.o. 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 She presents GYN clinic with complaint of malaise, abdominal distention and abdominal pain patient was sent to ER for further evaluation. On admission she is noted to have acute renal failure. Cr at baseline 0.6mg/dl 1 week ago. Most recent cr~ 7.9 BUN 56. Patient noticed drop in UOP in last few days. She also admits to taking diclofenic Na for pain. Patient has no prior hx of renal issues. CT A/P on admission showed ascites and soft tissue edema. No hydro noted. Mckenzie cath was placed on admission with good UOP.     Review of Systems   Unable to perform ROS: Acuity of condition   Constitutional: Negative.    HENT: Negative.    Respiratory: Negative.    Cardiovascular: Negative.    Gastrointestinal: Positive for abdominal distention and abdominal pain.   Genitourinary: Negative.    Musculoskeletal: Negative.    Skin: Negative.    Hematological: Negative.    Psychiatric/Behavioral: Negative.         Past Medical History:   Diagnosis Date   • Abnormal glucose    • Body piercing     EARS ONLY   • Dyspepsia     rare heartburn sx's, serum h. pyl neg   • Dyspnea on exertion    • Elevated BP     156/106 @ Intake, denies hx HTN.     • Elevated transaminase level    • Fatigue    • Former smoker     Vapes now   • GERD (gastroesophageal reflux disease)    • Morbid obesity    •  (normal spontaneous vaginal delivery)     x 1 w/o complic   • Sleep apnea     newly dx, but suspects she has always had it   • Sleep apnea, obstructive 2018    recent sleep stuady change to bipap   • Tattoos     NECK, LEFT THIGH, RIGHT ANKLE   • Vitamin D deficiency    ,    Past Surgical History:   Procedure Laterality Date   • ABDOMINOPLASTY N/A 2019    Procedure: ABDOMINOPLASTY;  Surgeon: Abisai Thurman MD;  Location:  [facility];  Service: Plastics   •  SECTION WITH TUBAL  2007   • CYSTECTOMY      ORAL   • ENDOSCOPY N/A 2018    Procedure: ESOPHAGOGASTRODUODENOSCOPY;  Surgeon: Glen Blackmon MD;  Location:  [facility];  Service: Bariatric   • GASTRIC SLEEVE LAPAROSCOPIC N/A 2018    Procedure: GASTRIC SLEEVE LAPAROSCOPIC;  Surgeon: Glen Blackmon MD;  Location:  [facility];  Service: Bariatric   • KNEE ARTHROSCOPY Right    • MASTOPEXY Bilateral 2019    Procedure: BREAST LIFT BILATERAL;  Surgeon: Abisai Thurman MD;  Location:  [facility];  Service: Plastics   • TOTAL LAPAROSCOPIC HYSTERECTOMY SALPINGECTOMY N/A 2023    Procedure: TOTAL LAPAROSCOPIC HYSTERECTOMY BILATERAL SALPINGECTOMY WITH DAVINCI ROBOT;  Surgeon: Janine Mckay MD;  Location:  [facility];  Service: Robotics - DaVinci;  Laterality: N/A;   • WISDOM TOOTH EXTRACTION      ALL FOUR   ,   Family History   Problem Relation Age of Onset   • Hypertension Mother    • No Known Problems Father    • No Known Problems Sister    • Obesity Maternal Grandmother    • Hypertension Maternal Grandmother    • Diabetes Maternal Grandmother    • Melanoma Maternal Grandfather    ,   Social History     Socioeconomic History   • Marital status:      Spouse name: Bruce Ortiz   • Number of children: 2   • Years of education: Bachelor's   Tobacco Use   • Smoking status: Former     Packs/day: 0.50     Years: 8.00     Pack years: 4.00     Types: Cigarettes     Quit date:      Years since quitting: 10.4   • Smokeless tobacco: Never   Vaping Use   • Vaping Use: Never used   Substance and Sexual Activity   • Alcohol use: Not Currently     Comment: Occasional   • Drug use: Not Currently     Types: Marijuana     Comment: 3-5 a month to sleep   • Sexual activity: Defer     Partners: Male      Comment:       E-cigarette/Vaping   • E-cigarette/Vaping Use Never User      E-cigarette/Vaping Substances     E-cigarette/Vaping Devices       ,   Medications Prior to Admission   Medication Sig Dispense Refill Last Dose   • acetaminophen (TYLENOL) 325 MG tablet Take 2 tablets by mouth Every 4 (Four) Hours As Needed for Mild Pain. Take every 4 hours while awake for 7 days then only as needed. 100 tablet 0    • Calcium Carb-Cholecalciferol (CALCIUM 600 + D PO) Take  by mouth Daily.      • Cranberry 1000 MG capsule Take  by mouth Daily.      • cyclobenzaprine (FLEXERIL) 10 MG tablet Take 1 tablet by mouth 3 (Three) Times a Day As Needed for Muscle Spasms. 15 tablet 0    • FLUoxetine (PROzac) 10 MG capsule PROzac      • lisdexamfetamine dimesylate (Vyvanse) 10 MG capsule Vyvanse      • meloxicam (MOBIC) 7.5 MG tablet Take 1 tablet by mouth Daily. Take daily x 7 days. Then only as needed. 20 tablet 0    • methylPREDNISolone (MEDROL) 4 MG dose pack Take as directed on package instructions. 21 tablet 0    • Multiple Vitamins-Minerals (DAILY MULTIVITAMIN PO) Take  by mouth Daily.      • omeprazole (prilOSEC) 10 MG capsule Omeprazole      • oxyCODONE (ROXICODONE) 5 MG immediate release tablet Take 1 tablet by mouth Every 4 (Four) Hours As Needed for Moderate Pain. 10 tablet 0    • polyethylene glycol (MIRALAX) 17 GM/SCOOP powder Take 17 g by mouth Daily. Take daily x 2 weeks to prevent constipation, then as needed. May hold for loose stool 225 g 0    • potassium chloride (K-DUR) 10 MEQ CR tablet Take 1 tablet by mouth 2 (Two) Times a Day.      • senna (SENOKOT) 8.6 MG tablet tablet Take  by mouth Every Night.       and Scheduled Meds:  [START ON 6/13/2023] FLUoxetine, 10 mg, Oral, Daily  Linezolid, 600 mg, Intravenous, Q12H  [START ON 6/13/2023] piperacillin-tazobactam, 3.375 g, Intravenous, Q12H  [START ON 6/13/2023] polyethylene glycol, 17 g, Oral, Daily  sodium chloride, 10 mL, Intravenous,  Q12H        Objective     Vital Signs  Temp:  [97.1 °F (36.2 °C)-98.1 °F (36.7 °C)] 97.1 °F (36.2 °C)  Heart Rate:  [] 54  Resp:  [16-18] 16  BP: (122-130)/(77-85) 130/85    I/O this shift:  In: -   Out: 4000 [Urine:4000]  No intake/output data recorded.    Physical Exam  Constitutional:       General: She is not in acute distress.     Appearance: Normal appearance. She is not ill-appearing.   HENT:      Head: Normocephalic and atraumatic.      Nose: Nose normal.      Mouth/Throat:      Mouth: Mucous membranes are moist.   Eyes:      Extraocular Movements: Extraocular movements intact.      Pupils: Pupils are equal, round, and reactive to light.   Cardiovascular:      Rate and Rhythm: Normal rate and regular rhythm.      Pulses: Normal pulses.      Heart sounds: Normal heart sounds. No murmur heard.    No friction rub.   Pulmonary:      Effort: Pulmonary effort is normal. No respiratory distress.      Breath sounds: Normal breath sounds. No stridor.   Abdominal:      General: Abdomen is flat. There is distension.   Musculoskeletal:      Cervical back: Normal range of motion.      Right lower leg: No edema.      Left lower leg: No edema.   Skin:     General: Skin is warm.   Neurological:      General: No focal deficit present.      Mental Status: She is alert and oriented to person, place, and time. Mental status is at baseline.   Psychiatric:         Mood and Affect: Mood normal.         Behavior: Behavior normal.         Results Review:    I reviewed the patient's new clinical results.    WBC WBC   Date Value Ref Range Status   06/12/2023 11.07 (H) 3.40 - 10.80 10*3/mm3 Final      HGB Hemoglobin   Date Value Ref Range Status   06/12/2023 13.5 12.0 - 15.9 g/dL Final      HCT Hematocrit   Date Value Ref Range Status   06/12/2023 41.4 34.0 - 46.6 % Final      Platlets No results found for: LABPLAT   MCV MCV   Date Value Ref Range Status   06/12/2023 97.4 (H) 79.0 - 97.0 fL Final          Sodium Sodium   Date  Value Ref Range Status   06/12/2023 139 136 - 145 mmol/L Final      Potassium Potassium   Date Value Ref Range Status   06/12/2023 4.9 3.5 - 5.2 mmol/L Final     Comment:     Slight hemolysis detected by analyzer. Results may be affected.      Chloride Chloride   Date Value Ref Range Status   06/12/2023 99 98 - 107 mmol/L Final      CO2 CO2   Date Value Ref Range Status   06/12/2023 21.0 (L) 22.0 - 29.0 mmol/L Final      BUN BUN   Date Value Ref Range Status   06/12/2023 56 (H) 6 - 20 mg/dL Final      Creatinine Creatinine   Date Value Ref Range Status   06/12/2023 7.96 (H) 0.57 - 1.00 mg/dL Final      Calcium Calcium   Date Value Ref Range Status   06/12/2023 8.9 8.6 - 10.5 mg/dL Final      PO4 No results found for: CAPO4   Albumin Albumin   Date Value Ref Range Status   06/12/2023 3.6 3.5 - 5.2 g/dL Final      Magnesium No results found for: MG   Uric Acid No results found for: URICACID         Assessment & Plan       Generalized abdominal pain      Assessment & Plan     Acute renal failure: Baseline cr ~ 0.6 mg/dl. Cr 7.9 on admission. Suspect etiology post renal [place] due to urinary retention. Other possibility NSAID use causing tubular damage.     Urinary retention: Following hysterectomy. CT A/P didn't show any hydro. 2 liter UOP since anderson cath insertion on admission. May need cystoscopy     Third spacing edema: Noted on CT A/P.     Met acidosis: Due to BENT[place]    Hysterectomy:7 days ago.     Recs  Acute kidney injury due to above mentioned risk factor. Monitor for post obstructive diuresis. Start LR @ 75cc/hr. Encourage po intake. Check Urine Na, cl and creatinine. Daily labs. Strict I/O. Dose meds to eGFR. Avoid nephrotoxic agents.       I discussed the patients findings and my recommendations with patient    Mario Ledesma MD  06/12/23  23:27 EDT

## 2023-06-13 NOTE — ANESTHESIA PREPROCEDURE EVALUATION
Anesthesia Evaluation     Patient summary reviewed and Nursing notes reviewed   NPO Solid Status: > 8 hours  NPO Liquid Status: > 2 hours           Airway   Mallampati: II  TM distance: >3 FB  Neck ROM: full  No difficulty expected  Dental      Pulmonary    (+) a smoker Former, shortness of breath (CHAVEZ stable ), sleep apnea (NC),   (-) asthma  Cardiovascular     ECG reviewed    (-) hypertension, dysrhythmias, angina, cardiac stents    ROS comment: ECG NSR QT prolonged     ECHO EF 55%  2) Normal left atrial size with normal LVedp   3) Trace MR and TR with normal PA pressures   4) Normal size and function of the RV       Neuro/Psych  (-) seizures, CVA  GI/Hepatic/Renal/Endo    (+) obesity (sp G sleeve ),  GERD,    (-) morbid obesity, no renal disease, diabetes, no thyroid disorder    Musculoskeletal     Abdominal    Substance History      OB/GYN          Other        ROS/Med Hx Other: Creatnine elevation after TLH last week   K 5.3 today       Phys Exam Other: Banuelos 2 -grade 1 view- last week -Henry                 Anesthesia Plan    ASA 3     general     (High K Borderline -  but urgent surgery (needed to correct) )  intravenous induction     Anesthetic plan, risks, benefits, and alternatives have been provided, discussed and informed consent has been obtained with: patient.    Plan discussed with CRNA.        CODE STATUS:    Code Status (Patient has no pulse and is not breathing): CPR (Attempt to Resuscitate)  Medical Interventions (Patient has pulse or is breathing): Full

## 2023-06-13 NOTE — CONSULTS
Urology Consult    Date of Admission: 2023  Date of Consult: 23    Primary Care Physician: Morgan Arguello MD  Referring Physician: Janine Mckay    Chief complaint/Reason for consultation urinary retention, acute kidney injury    Subjective     History of Present Illness Zofia is a 43-year-old white female  who had a robotic laparoscopic hysterectomy/salpingectomy 8 days ago.  Surgery was uneventful and recovery was uneventful early on.  By postop day 3 she noticed that she was gaining weight daily and her weight increased from 148 pounds baseline to up to 176 pounds as of yesterday as she had decreasing urine output decreasing bowel movements.  Yesterday she was sent over from Dr. Mckay's office to be admitted and a CT scan shows a large amount of intra-abdominal fluid and BUN and creatinine were severely elevated above baseline.  Mckenzie catheter was inserted and she immediately diuresed 4 L over the next hour and is continued to diurese overnight.  She gives no real prior urologic history including rare urinary tract infections, no stones surgeries or episodes of retention.  She denies any flank pain.  She has had no nausea or vomiting.  Review of Systems feeling a little bit better and less bloated today she has no chest pain or shortness of breath.  Otherwise complete ROS is negative except as mentioned above.    Past Medical History:  has a past medical history of Abnormal glucose, Body piercing, Dyspepsia, Dyspnea on exertion, Elevated BP, Elevated transaminase level, Fatigue, Former smoker, GERD (gastroesophageal reflux disease), Morbid obesity,  (normal spontaneous vaginal delivery), Sleep apnea, Sleep apnea, obstructive (2018), Tattoos, and Vitamin D deficiency.  G2, P2    Past Surgical History:  has a past surgical history that includes  section with tubal (); Knee arthroscopy (Right, 2013); [place] tooth extraction; Cystectomy; Gastric Sleeve (N/A, 2018);  Esophagogastroduodenoscopy (N/A, 03/14/2018); Mastopexy (Bilateral, 04/30/2019); Abdominoplasty (N/A, 04/30/2019); and total laparoscopic hysterectomy salpingectomy (N/A, 6/5/2023).    Family History: family history includes Diabetes in her maternal grandmother; Hypertension in her maternal grandmother and mother; Melanoma in her maternal grandfather; No Known Problems in her father and sister; Obesity in her maternal grandmother.    Social History:  reports that she quit smoking about 10 years ago. Her smoking use included cigarettes. She has a 4.00 pack-year smoking history. She has never used smokeless tobacco. She reports that she does not currently use alcohol. She reports that she does not currently use drugs after having used the following drugs: Marijuana.  She is  and currently a student    Medications: Scheduled Meds:FLUoxetine, 10 mg, Oral, Daily  Linezolid, 600 mg, Intravenous, Q12H  piperacillin-tazobactam, 3.375 g, Intravenous, Q12H  polyethylene glycol, 17 g, Oral, Daily  sodium chloride, 10 mL, Intravenous, Q12H      Continuous Infusions:lactated ringers, 75 mL/hr, Last Rate: 75 mL/hr (06/13/23 0023)      PRN Meds:.  acetaminophen    ALPRAZolam    ondansetron **OR** ondansetron    oxyCODONE    oxyCODONE-acetaminophen    sodium chloride    sodium chloride    sodium chloride  No Known Allergies    Objective    Physical Exam:   Vital Signs have been reviewed  Physical Exam  Well-developed well-nourished white female no acute distress  HEENT: NCAT PERRLA EOMI  Heart: Regular rate and rhythm  Lungs: Clear to auscultation  Abdomen is doughy soft and mildly tender.  It does not seem to be overly distended.  Laparoscopy incisions are clean and healing nicely.  Pelvic exam is deferred.  Indwelling Mckenzie catheter is draining clear urine.  Extremities: Free of sinus Klim or edema  Neurologic: Grossly intact  Psych: Normal    Results Reviewed:  I have personally reviewed current lab, radiology, and data  and agree with results.  CT scan shows normal kidneys with no hydronephrosis.  Left ureter is easily traceable all the way down to the bladder but right ureter is not as easily traced.  Bladder is difficult to characterize could be enormous or decompressed with a large amount of ascitic fluid.    Results from last 7 days   Lab Units 06/12/23  1622   WBC 10*3/mm3 11.07*   HEMOGLOBIN g/dL 13.5   PLATELETS 10*3/mm3 432     Results from last 7 days   Lab Units 06/13/23  0444   SODIUM mmol/L 138   POTASSIUM mmol/L 5.3*   CO2 mmol/L 24.0   BUN mg/dL 37*   CREATININE mg/dL 3.94*   GLUCOSE mg/dL 122*   CALCIUM mg/dL 8.8             Generalized abdominal pain        Assessment & Plan urinary retention with acute kidney injury 7 days post robotic hysterectomy.  Very well could be urine leak from the bladder or one of the ureters.  I think the left ureter is unlikely but the right ureter is possible.  It may also be a spontaneous bladder perforation from fairly silent distention.  Renal function has improved dramatically overnight with Mckenzie catheter insertion.  That is overall a favorable sign.    I will plan a cystoscopy with bilateral retrograde pyelograms and possible stent insertion if ureteral injury (partial) is found.  Assessment & Plan    I discussed the patients findings and my recommendations with: Patient and her  and Dr. Woody Canales MD  06/13/23  07:35 EDT

## 2023-06-13 NOTE — PROGRESS NOTES
"   LOS: 1 day    Patient Care Team:  Morgan Arguello MD as PCP - General (Family Medicine)  Morgan Arguello MD as Referring Physician (Family Medicine)    Reason for consult - JESSICA and urinary retention     Subjective     Interval History:     No acute events overnight. No new complaints       Review of Systems:   No CP or SOA , fever, chills, rigors, rash, N/V, Constipation.       Objective     Vital Sign Min/Max for last 24 hours  Temp  Min: 97.1 °F (36.2 °C)  Max: 98.1 °F (36.7 °C)   BP  Min: 91/58  Max: 130/85   Pulse  Min: 54  Max: 103   Resp  Min: 16  Max: 18   SpO2  Min: 92 %  Max: 100 %   No data recorded   Weight  Min: 78 kg (172 lb)  Max: 78 kg (172 lb)     Flowsheet Rows      Flowsheet Row First Filed Value   Admission Height 167.6 cm (66\") Documented at 06/12/2023 1530   Admission Weight 78 kg (172 lb) Documented at 06/12/2023 1530            No intake/output data recorded.  I/O last 3 completed shifts:  In: 461 [I.V.:461]  Out: 5300 [Urine:5300]    Physical Exam:    Gen: Alert, NAD   HENT: NC, AT, EOMI   NECK: Supple, no JVD, Trachea midline   LUNGS: CTA bilaterally, non labored respirtation   CVS: S1/S2 audible, RRR, no murmur   Abd: Soft, NT, ND, BS+   Ext: No pedal edema, no cyanosis   CNS: Alert, No focal deficit noted grossly  Psy: Cooperative  Skin: Warm, dry and intact      WBC WBC   Date Value Ref Range Status   06/12/2023 11.07 (H) 3.40 - 10.80 10*3/mm3 Final      HGB Hemoglobin   Date Value Ref Range Status   06/12/2023 13.5 12.0 - 15.9 g/dL Final      HCT Hematocrit   Date Value Ref Range Status   06/12/2023 41.4 34.0 - 46.6 % Final      Platlets No results found for: LABPLAT   MCV MCV   Date Value Ref Range Status   06/12/2023 97.4 (H) 79.0 - 97.0 fL Final          Sodium Sodium   Date Value Ref Range Status   06/13/2023 138 136 - 145 mmol/L Final   06/12/2023 139 136 - 145 mmol/L Final      Potassium Potassium   Date Value Ref Range Status   06/13/2023 5.3 (H) 3.5 - 5.2 " mmol/L Final   06/12/2023 4.9 3.5 - 5.2 mmol/L Final     Comment:     Slight hemolysis detected by analyzer. Results may be affected.      Chloride Chloride   Date Value Ref Range Status   06/13/2023 105 98 - 107 mmol/L Final   06/12/2023 99 98 - 107 mmol/L Final      CO2 CO2   Date Value Ref Range Status   06/13/2023 24.0 22.0 - 29.0 mmol/L Final   06/12/2023 21.0 (L) 22.0 - 29.0 mmol/L Final      BUN BUN   Date Value Ref Range Status   06/13/2023 37 (H) 6 - 20 mg/dL Final   06/12/2023 56 (H) 6 - 20 mg/dL Final      Creatinine Creatinine   Date Value Ref Range Status   06/13/2023 3.94 (H) 0.57 - 1.00 mg/dL Final   06/12/2023 7.96 (H) 0.57 - 1.00 mg/dL Final      Calcium Calcium   Date Value Ref Range Status   06/13/2023 8.8 8.6 - 10.5 mg/dL Final   06/12/2023 8.9 8.6 - 10.5 mg/dL Final      PO4 No results found for: CAPO4   Albumin Albumin   Date Value Ref Range Status   06/12/2023 3.6 3.5 - 5.2 g/dL Final      Magnesium No results found for: MG   Uric Acid No results found for: URICACID        Results Review:     I reviewed the patient's new clinical results.    FLUoxetine, 10 mg, Oral, Daily  Linezolid, 600 mg, Intravenous, Q12H  piperacillin-tazobactam, 3.375 g, Intravenous, Q12H  polyethylene glycol, 17 g, Oral, Daily  sodium chloride, 10 mL, Intravenous, Q12H      lactated ringers, 25 mL/hr, Last Rate: 25 mL/hr (06/13/23 0836)        Medication Review: yes    Assessment & Plan       Generalized abdominal pain    1- JESSICA- non oliguric - Urinary retention - sp anderson catheter - improving   2- Urinary retention   3- Hx of hysterectomy - 1 week back     Plan:  - Continue with current   - Monitor I/O   - Avoid nephrotoxic agents.   - Renal diet   - Adjust meds per renal function         Artem Lozano MD  06/13/23  12:46 EDT

## 2023-06-13 NOTE — H&P
Edgar  Zofia Ortiz  : 1980  MRN: [id number]  CSN: [id number]        Service: Gynecology       Date: 2023       Subjective   Zofia Ortiz is a 43 y.o. 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 She presents to the office today with complaint of malaise, abdominal distention and abdominal pain.  She began feeling unwell about 3 days after surgery.  Over the last 2 days she has become more uncomfortable and more distended.  She denies nausea, vomiting, diarrhea, fever, chills or dysuria.  She reports constipation and infrequent urination. Her pain is greater on the right than the left.     Past Medical History:   Diagnosis Date    Abnormal glucose     Body piercing     EARS ONLY    Dyspepsia     rare heartburn sx's, serum h. pyl neg    Dyspnea on exertion     Elevated BP     156/106 @ Intake, denies hx HTN.      Elevated transaminase level     Fatigue     Former smoker     Vapes now    GERD (gastroesophageal reflux disease)     Morbid obesity      (normal spontaneous vaginal delivery)     x 1 w/o complic    Sleep apnea     newly dx, but suspects she has always had it    Sleep apnea, obstructive 2018    recent sleep stuady change to bipap    Tattoos     NECK, LEFT THIGH, RIGHT ANKLE    Vitamin D deficiency      Past Surgical History:   Procedure Laterality Date    ABDOMINOPLASTY N/A 2019    Procedure: ABDOMINOPLASTY;  Surgeon: Abisai Thurman MD;  Location: [facility] OR;  Service: Plastics     SECTION WITH TUBAL  2007    CYSTECTOMY      ORAL    ENDOSCOPY N/A 2018    Procedure: ESOPHAGOGASTRODUODENOSCOPY;  Surgeon: Glen Blackmon MD;  Location: [facility] OR;  Service: Bariatric    GASTRIC SLEEVE LAPAROSCOPIC N/A 2018    Procedure: GASTRIC SLEEVE LAPAROSCOPIC;  Surgeon: Glen Blackmon MD;  Location: [facility] OR;  Service: Bariatric    KNEE ARTHROSCOPY Right 2013    MASTOPEXY Bilateral 2019    Procedure:  BREAST LIFT BILATERAL;  Surgeon: Abisai Thurman MD;  Location:  [facility] OR;  Service: Plastics    TOTAL LAPAROSCOPIC HYSTERECTOMY SALPINGECTOMY N/A 6/5/2023    Procedure: TOTAL LAPAROSCOPIC HYSTERECTOMY BILATERAL SALPINGECTOMY WITH DAVINCI ROBOT;  Surgeon: Janine Mckay MD;  Location:  [facility] OR;  Service: Robotics - DaVinci;  Laterality: N/A;    WISDOM TOOTH EXTRACTION      ALL FOUR     OB History   No obstetric history on file.     Social History    Tobacco Use      Smoking status: Former        Packs/day: 0.50        Years: 8.00        Pack years: 4        Types: Cigarettes        Quit date: 2013        Years since quitting: 10.4      Smokeless tobacco: Never      Current Facility-Administered Medications:     HYDROmorphone (DILAUDID) injection 0.5 mg, 0.5 mg, Intravenous, Q10 Min PRN, [place], Anish, DO, 0.5 mg at 06/12/23 1917    Linezolid (ZYVOX) 600 mg 300 mL, 600 mg, Intravenous, Once, [place], Anish, DO    piperacillin-tazobactam (ZOSYN) 3.375 g in iso-osmotic dextrose 50 ml (premix), 3.375 g, Intravenous, Once, [place], Anish, DO, 3.375 g at 06/12/23 1942    sodium chloride 0.9 % flush 10 mL, 10 mL, Intravenous, PRN, [place], Anish, DO    Current Outpatient Medications:     acetaminophen (TYLENOL) 325 MG tablet, Take 2 tablets by mouth Every 4 (Four) Hours As Needed for Mild Pain. Take every 4 hours while awake for 7 days then only as needed., Disp: 100 tablet, Rfl: 0    Calcium Carb-Cholecalciferol (CALCIUM 600 + D PO), Take  by mouth Daily., Disp: , Rfl:     Cranberry 1000 MG capsule, Take  by mouth Daily., Disp: , Rfl:     cyclobenzaprine (FLEXERIL) 10 MG tablet, Take 1 tablet by mouth 3 (Three) Times a Day As Needed for Muscle Spasms., Disp: 15 tablet, Rfl: 0    FLUoxetine (PROzac) 10 MG capsule, PROzac, Disp: , Rfl:     lisdexamfetamine dimesylate (Vyvanse) 10 MG capsule, Vyvanse, Disp: , Rfl:     meloxicam (MOBIC) 7.5 MG tablet, Take 1 tablet by mouth Daily. Take daily x 7 days. Then only as needed.,  "Disp: 20 tablet, Rfl: 0    methylPREDNISolone (MEDROL) 4 MG dose pack, Take as directed on package instructions., Disp: 21 tablet, Rfl: 0    Multiple Vitamins-Minerals (DAILY MULTIVITAMIN PO), Take  by mouth Daily., Disp: , Rfl:     omeprazole (prilOSEC) 10 MG capsule, Omeprazole, Disp: , Rfl:     oxyCODONE (ROXICODONE) 5 MG immediate release tablet, Take 1 tablet by mouth Every 4 (Four) Hours As Needed for Moderate Pain., Disp: 10 tablet, Rfl: 0    polyethylene glycol (MIRALAX) 17 GM/SCOOP powder, Take 17 g by mouth Daily. Take daily x 2 weeks to prevent constipation, then as needed. May hold for loose stool, Disp: 225 g, Rfl: 0    potassium chloride (K-DUR) 10 MEQ CR tablet, Take 1 tablet by mouth 2 (Two) Times a Day., Disp: , Rfl:     senna (SENOKOT) 8.6 MG tablet tablet, Take  by mouth Every Night., Disp: , Rfl:     No Known Allergies    Review of Systems      Objective   /77 (BP Location: Left arm, Patient Position: Sitting)   Pulse 103   Temp 98.1 °F (36.7 °C) (Oral)   Resp 18   Ht 167.6 cm (66\")   Wt 78 kg (172 lb)   LMP 04/17/2019   SpO2 100%   BMI 27.76 kg/m²   General: well developed; well nourished  no acute distress   Heart: Not performed.   Lungs: breathing is unlabored   Abdomen: Abdomen distended. Tender to superficial palpation.  Positive bowel sounds. Inicisions clean, dry and intact   Pelvis:: Not performed.   Labs  Amylase & Lipase:   Lab Results   Component Value Date    LIPASE 12 (L) 06/12/2023     BMP:   Lab Results   Component Value Date     06/12/2023    K 4.9 06/12/2023    CL 99 06/12/2023    CO2 21.0 (L) 06/12/2023    BUN 56 (H) 06/12/2023    CREATININE 7.96 (H) 06/12/2023     CBC w/ diff:   Lab Results   Component Value Date    WBC 11.07 (H) 06/12/2023    NEUTRORELPCT 76.9 (H) 06/12/2023    AUTOIGPER 0.4 06/12/2023    LYMPHORELPCT 7.0 (L) 06/12/2023    MONORELPCT 6.7 06/12/2023    EOSRELPCT 8.5 (H) 06/12/2023    BASORELPCT 0.5 06/12/2023    HGB 13.5 06/12/2023    HCT " 41.4 06/12/2023    MCV 97.4 (H) 06/12/2023    RDW 14.5 06/12/2023     06/12/2023       Imaging Reviewed  CT scan       Assessment   Post op day 7 s/p robotic hysterectomy with suspected urinary tract injury.     Plan   Discussed findings, concerns and plan of care with the patient and her .    Urology consult.  Case discussed with Dr. Canales who will see the patient tomorrow.  Anticipate diagnostic cystoscopy with bilateral ureteral stents.       Janine Mckay MD  6/12/2023  20:09 EDT

## 2023-06-13 NOTE — PAYOR COMM NOTE
"Zofia Ortiz (43 y.o. Female)     [id number]     Janine Stahl RN  Utilization Review  [phone]  [phone]        Date of Birth   1980    Social Security Number       Address   [address] [place] [address]    Home Phone   [phone]    MRN   [id number]       Taoist   None    Marital Status                               Admission Date   23    Admission Type   Emergency    Admitting Provider   Janine Mckay MD    Attending Provider   Janine Mckay MD    Department, Room/Bed   [address], [address]/1       Discharge Date       Discharge Disposition       Discharge Destination                                 Attending Provider: Janine Mckay MD    Allergies: No Known Allergies    Isolation: None   Infection: None   Code Status: CPR    Ht: 167.6 cm (66\")   Wt: 78 kg (172 lb)    Admission Cmt: None   Principal Problem: Generalized abdominal pain [R10.84]                   Active Insurance as of 2023       Primary Coverage       Payor Plan Insurance Group Employer/Plan Group    ANTHEM BLUE CROSS ANTH BLUE CROSS BLUE [facility] PPO [id number]       Payor Plan Address Payor Plan Phone Number Payor Plan Fax Number Effective Dates    [address] [phone]  2016 - None Entered    [place] [address]         Subscriber Name Subscriber Birth Date Member ID       BRUCE ORTIZ 9/3/1977 [id number]                     Emergency Contacts        (Rel.) Home Phone Work Phone Mobile Phone    Bruce Ortiz (Spouse) [phone] -- [phone]                 History & Physical        Janine Mckay MD at 23 [address] [place] [address] Edgar Ortiz  : 1980  MRN: [id number]  CSN: [id number]        Service: Gynecology       Date: 2023       Subjective   Zofia Ortiz is a 43 y.o. 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 She presents to the office " today with complaint of malaise, abdominal distention and abdominal pain.  She began feeling unwell about 3 days after surgery.  Over the last 2 days she has become more uncomfortable and more distended.  She denies nausea, vomiting, diarrhea, fever, chills or dysuria.  She reports constipation and infrequent urination. Her pain is greater on the right than the left.     Past Medical History:   Diagnosis Date    Abnormal glucose     Body piercing     EARS ONLY    Dyspepsia     rare heartburn sx's, serum h. pyl neg    Dyspnea on exertion     Elevated BP     156/106 @ Intake, denies hx HTN.      Elevated transaminase level     Fatigue     Former smoker     Vapes now    GERD (gastroesophageal reflux disease)     Morbid obesity      (normal spontaneous vaginal delivery)     x 1 w/o complic    Sleep apnea     newly dx, but suspects she has always had it    Sleep apnea, obstructive 2018    recent sleep stuady change to bipap    Tattoos     NECK, LEFT THIGH, RIGHT ANKLE    Vitamin D deficiency      Past Surgical History:   Procedure Laterality Date    ABDOMINOPLASTY N/A 2019    Procedure: ABDOMINOPLASTY;  Surgeon: Abisai Thurman MD;  Location:  [facility];  Service: Plastics     SECTION WITH TUBAL  2007    CYSTECTOMY      ORAL    ENDOSCOPY N/A 2018    Procedure: ESOPHAGOGASTRODUODENOSCOPY;  Surgeon: Glen Blackmon MD;  Location:  [facility];  Service: Bariatric    GASTRIC SLEEVE LAPAROSCOPIC N/A 2018    Procedure: GASTRIC SLEEVE LAPAROSCOPIC;  Surgeon: Glen Blackmon MD;  Location:  [facility];  Service: Bariatric    KNEE ARTHROSCOPY Right 2013    MASTOPEXY Bilateral 2019    Procedure: BREAST LIFT BILATERAL;  Surgeon: Abisai Thurman MD;  Location:  [facility];  Service: Plastics    TOTAL LAPAROSCOPIC HYSTERECTOMY SALPINGECTOMY N/A 2023    Procedure: TOTAL LAPAROSCOPIC HYSTERECTOMY BILATERAL SALPINGECTOMY WITH DAVINCI ROBOT;  Surgeon: Janine Mckay MD;  Location:   [facility];  Service: Robotics - DaVinci;  Laterality: N/A;    WISDOM TOOTH EXTRACTION      ALL FOUR     OB History   No obstetric history on file.     Social History    Tobacco Use      Smoking status: Former        Packs/day: 0.50        Years: 8.00        Pack years: 4        Types: Cigarettes        Quit date: 2013        Years since quitting: 10.4      Smokeless tobacco: Never      Current Facility-Administered Medications:     HYDROmorphone (DILAUDID) injection 0.5 mg, 0.5 mg, Intravenous, Q10 Min PRN, Denio, Anish, DO, 0.5 mg at 06/12/23 1917    Linezolid (ZYVOX) 600 mg 300 mL, 600 mg, Intravenous, Once, Denio, Anish, DO    piperacillin-tazobactam (ZOSYN) 3.375 g in iso-osmotic dextrose 50 ml (premix), 3.375 g, Intravenous, Once, Denio, Anish, DO, 3.375 g at 06/12/23 1942    sodium chloride 0.9 % flush 10 mL, 10 mL, Intravenous, PRN, Denio, Anish, DO    Current Outpatient Medications:     acetaminophen (TYLENOL) 325 MG tablet, Take 2 tablets by mouth Every 4 (Four) Hours As Needed for Mild Pain. Take every 4 hours while awake for 7 days then only as needed., Disp: 100 tablet, Rfl: 0    Calcium Carb-Cholecalciferol (CALCIUM 600 + D PO), Take  by mouth Daily., Disp: , Rfl:     Cranberry 1000 MG capsule, Take  by mouth Daily., Disp: , Rfl:     cyclobenzaprine (FLEXERIL) 10 MG tablet, Take 1 tablet by mouth 3 (Three) Times a Day As Needed for Muscle Spasms., Disp: 15 tablet, Rfl: 0    FLUoxetine (PROzac) 10 MG capsule, PROzac, Disp: , Rfl:     lisdexamfetamine dimesylate (Vyvanse) 10 MG capsule, Vyvanse, Disp: , Rfl:     meloxicam (MOBIC) 7.5 MG tablet, Take 1 tablet by mouth Daily. Take daily x 7 days. Then only as needed., Disp: 20 tablet, Rfl: 0    methylPREDNISolone (MEDROL) 4 MG dose pack, Take as directed on package instructions., Disp: 21 tablet, Rfl: 0    Multiple Vitamins-Minerals (DAILY MULTIVITAMIN PO), Take  by mouth Daily., Disp: , Rfl:     omeprazole (prilOSEC) 10 MG capsule, Omeprazole, Disp: , Rfl:      "oxyCODONE (ROXICODONE) 5 MG immediate release tablet, Take 1 tablet by mouth Every 4 (Four) Hours As Needed for Moderate Pain., Disp: 10 tablet, Rfl: 0    polyethylene glycol (MIRALAX) 17 GM/SCOOP powder, Take 17 g by mouth Daily. Take daily x 2 weeks to prevent constipation, then as needed. May hold for loose stool, Disp: 225 g, Rfl: 0    potassium chloride (K-DUR) 10 MEQ CR tablet, Take 1 tablet by mouth 2 (Two) Times a Day., Disp: , Rfl:     senna (SENOKOT) 8.6 MG tablet tablet, Take  by mouth Every Night., Disp: , Rfl:     No Known Allergies    Review of Systems      Objective   /77 (BP Location: Left arm, Patient Position: Sitting)   Pulse 103   Temp 98.1 °F (36.7 °C) (Oral)   Resp 18   Ht 167.6 cm (66\")   Wt 78 kg (172 lb)   LMP 04/17/2019   SpO2 100%   BMI 27.76 kg/m²   General: well developed; well nourished  no acute distress   Heart: Not performed.   Lungs: breathing is unlabored   Abdomen: Abdomen distended. Tender to superficial palpation.  Positive bowel sounds. Inicisions clean, dry and intact   Pelvis:: Not performed.   Labs  Amylase & Lipase:   Lab Results   Component Value Date    LIPASE 12 (L) 06/12/2023     BMP:   Lab Results   Component Value Date     06/12/2023    K 4.9 06/12/2023    CL 99 06/12/2023    CO2 21.0 (L) 06/12/2023    BUN 56 (H) 06/12/2023    CREATININE 7.96 (H) 06/12/2023     CBC w/ diff:   Lab Results   Component Value Date    WBC 11.07 (H) 06/12/2023    NEUTRORELPCT 76.9 (H) 06/12/2023    AUTOIGPER 0.4 06/12/2023    LYMPHORELPCT 7.0 (L) 06/12/2023    MONORELPCT 6.7 06/12/2023    EOSRELPCT 8.5 (H) 06/12/2023    BASORELPCT 0.5 06/12/2023    HGB 13.5 06/12/2023    HCT 41.4 06/12/2023    MCV 97.4 (H) 06/12/2023    RDW 14.5 06/12/2023     06/12/2023       Imaging Reviewed  CT scan       Assessment   Post op day 7 s/p robotic hysterectomy with suspected urinary tract injury.     Plan   Discussed findings, concerns and plan of care with the patient and her " .    Urology consult.  Case discussed with Dr. Canales who will see the patient tomorrow.  Anticipate diagnostic cystoscopy with bilateral ureteral stents.       Janine Mckay MD  6/12/2023  20:09 EDT       Electronically signed by Janine Mckay MD at 06/12/23 2020       Vital Signs (last day)       Date/Time Temp Temp src Pulse Resp BP Patient Position SpO2    06/13/23 0700 97.9 (36.6) Temporal 73 17 95/64 Lying 97    06/13/23 0337 97.6 (36.4) Temporal 78 16 100/66 Lying 95    06/13/23 0055 97.3 (36.3) Temporal 99 18 91/58 Lying 97    06/12/23 2138 97.1 (36.2) Temporal 54 16 130/85 Lying 92    06/12/23 1530 98.1 (36.7) Oral 103 18 122/77 Sitting 100          Oxygen Therapy (last day)       Date/Time SpO2 Device (Oxygen Therapy) Flow (L/min) Oxygen Concentration (%) ETCO2 (mmHg)    06/13/23 0700 97 room air -- -- --    06/13/23 0600 -- room air -- -- --    06/13/23 0400 -- room air -- -- --    06/13/23 0337 95 room air -- -- --    06/13/23 0213 -- room air -- -- --    06/13/23 0055 97 room air -- -- --    06/13/23 0000 -- room air -- -- --    06/12/23 2200 -- room air -- -- --    06/12/23 2138 92 room air -- -- --    06/12/23 2121 -- room air -- -- --    06/12/23 1530 100 room air -- -- --          Lines, Drains & Airways       Active LDAs       Name Placement date Placement time Site Days    Peripheral IV 06/12/23 2030 Anterior;Right Forearm 06/12/23 2030  Forearm  less than 1    Urethral Catheter Silicone 16 Fr. 06/12/23 2031  -- less than 1                  Current Facility-Administered Medications   Medication Dose Route Frequency Provider Last Rate Last Admin    acetaminophen (TYLENOL) tablet 650 mg  650 mg Oral Q4H PRN Janine Mckay MD        ALPRAZolam (XANAX) tablet 0.25 mg  0.25 mg Oral TID PRN Janine Mckay MD   0.25 mg at 06/12/23 2324    FLUoxetine (PROzac) capsule 10 mg  10 mg Oral Daily Janine Mckay MD   10 mg at 06/13/23 0850    lactated ringers infusion  25 mL/hr  Intravenous Continuous Antonio Canales MD 25 mL/hr at 06/13/23 0836 25 mL/hr at 06/13/23 0836    Linezolid (ZYVOX) 600 mg 300 mL  600 mg Intravenous Q12H Janine Mckay  mL/hr at 06/13/23 1025 600 mg at 06/13/23 1025    ondansetron (ZOFRAN) tablet 4 mg  4 mg Oral Q6H PRN Janine Mkcay MD   4 mg at 06/12/23 2306    Or    ondansetron (ZOFRAN) injection 4 mg  4 mg Intravenous Q6H PRN Janine Mckay MD        oxyCODONE (ROXICODONE) immediate release tablet 5 mg  5 mg Oral Q4H PRN Janine Mckay MD   5 mg at 06/13/23 0850    oxyCODONE-acetaminophen (PERCOCET)  MG per tablet 1 tablet  1 tablet Oral Q4H PRN Janine Mckay MD   1 tablet at 06/13/23 0630    piperacillin-tazobactam (ZOSYN) 3.375 g in iso-osmotic dextrose 50 ml (premix)  3.375 g Intravenous Q12H Janine Mckay MD   3.375 g at 06/13/23 0157    polyethylene glycol (MIRALAX) packet 17 g  17 g Oral Daily Janine Mckay MD   17 g at 06/13/23 0849    sodium chloride 0.9 % flush 10 mL  10 mL Intravenous PRN Janine Mckay MD        sodium chloride 0.9 % flush 10 mL  10 mL Intravenous Q12H Janine Mckay MD   10 mL at 06/12/23 2259    sodium chloride 0.9 % flush 10 mL  10 mL Intravenous PRN Janine Mckay MD        sodium chloride 0.9 % infusion 40 mL  40 mL Intravenous PRN Janine Mckay MD         Lab Results (last 24 hours)       Procedure Component Value Units Date/Time    Creatinine Urine Random (kidney function) GFR component - Urine, Clean Catch [[id number]] Collected: 06/12/23 1946    Specimen: Urine, Clean Catch Updated: 06/13/23 0944     Creatinine, Urine 49.0 mg/dL     Narrative:      Reference intervals for random urine have not been established.  Clinical usage is dependent upon physician's interpretation in combination with other laboratory tests.       Basic Metabolic Panel [[id number]]  (Abnormal) Collected: 06/13/23 0444    Specimen: Blood Updated: 06/13/23 0540     Glucose 122 mg/dL      BUN  37 mg/dL      Creatinine 3.94 mg/dL      Sodium 138 mmol/L      Potassium 5.3 mmol/L      Chloride 105 mmol/L      CO2 24.0 mmol/L      Calcium 8.8 mg/dL      BUN/Creatinine Ratio 9.4     Anion Gap 9.0 mmol/L      eGFR 13.9 mL/min/1.73      Comment: <15 Indicative of kidney failure       Narrative:      GFR Normal >60  Chronic Kidney Disease <60  Kidney Failure <15      Sodium, Urine, Random - Urine, Clean Catch [[id number]] Collected: 06/12/23 1946    Specimen: Urine, Clean Catch Updated: 06/13/23 0036     Sodium, Urine 105 mmol/L     Narrative:      Reference intervals for random urine have not been established.  Clinical usage is dependent upon physician's interpretation in combination with other laboratory tests.       Blood Culture - Blood, Arm, Right [[id number]] Collected: 06/12/23 2029    Specimen: Blood from Arm, Right Updated: 06/12/23 2048    [place] Draw [[id number]] Collected: 06/12/23 1622    Specimen: Blood Updated: 06/12/23 2030    Narrative:      The following orders were created for panel order [place] Draw.  Procedure                               Abnormality         Status                     ---------                               -----------         ------                     Green Top (Gel)[[id number]]                                  Final result               Lavender Top[[id number]]                                     Final result               Gold Top - SST[[id number]]                                   Final result               Nicole Top[[id number]]                                         Final result               Light Blue Top[[id number]]                                   Final result                 Please view results for these tests on the individual orders.    Nicole Top [[id number]] Collected: 06/12/23 1622    Specimen: Blood Updated: 06/12/23 2030     Extra Tube Hold for add-ons.     Comment: Auto resulted.       Blood Culture - Blood, Arm, Right [[id number]] Collected: 06/12/23 1938     "Specimen: Blood from Arm, Right Updated: 06/12/23 2009    Urinalysis With Microscopic If Indicated (No Culture) - Urine, Clean Catch [[id number]]  (Abnormal) Collected: 06/12/23 1946    Specimen: Urine, Clean Catch Updated: 06/12/23 2004     Color, UA Yellow     Appearance, UA Cloudy     pH, UA 7.0     Specific Gravity, UA 1.019     Glucose,  mg/dL (Trace)     Ketones, UA Negative     Bilirubin, UA Negative     Blood, UA Small (1+)     Protein, UA >=300 mg/dL (3+)     Leuk Esterase, UA Small (1+)     Nitrite, UA Negative     Urobilinogen, UA 0.2 E.U./dL    Urinalysis, Microscopic Only - Urine, Clean Catch [[id number]]  (Abnormal) Collected: 06/12/23 1946    Specimen: Urine, Clean Catch Updated: 06/12/23 2004     RBC, UA 21-30 /HPF      WBC, UA Too Numerous to Count /HPF      Bacteria, UA None Seen /HPF      Squamous Epithelial Cells, UA 0-2 /HPF      Hyaline Casts, UA 0-6 /LPF      Methodology Automated Microscopy    Procalcitonin [[id number]]  (Normal) Collected: 06/12/23 1622    Specimen: Blood Updated: 06/12/23 1955     Procalcitonin 0.24 ng/mL     Narrative:      As a Marker for Sepsis (Non-Neonates):    1. <0.5 ng/mL represents a low risk of severe sepsis and/or septic shock.  2. >2 ng/mL represents a high risk of severe sepsis and/or septic shock.    As a Marker for Lower Respiratory Tract Infections that require antibiotic therapy:    PCT on Admission    Antibiotic Therapy       6-12 Hrs later    >0.5                Strongly Recommended  >0.25 - <0.5        Recommended   0.1 - 0.25          Discouraged              Remeasure/reassess PCT  <0.1                Strongly Discouraged     Remeasure/reassess PCT    As 28 day mortality risk marker: \"Change in Procalcitonin Result\" (>80% or <=80%) if Day 0 (or Day 1) and Day 4 values are available. Refer to http://www.[facility]-pct-calculator.com    Change in PCT <=80%  A decrease of PCT levels below or equal to 80% defines a positive change in PCT test result " representing a higher risk for 28-day all-cause mortality of patients diagnosed with severe sepsis for septic shock.    Change in PCT >80%  A decrease of PCT levels of more than 80% defines a negative change in PCT result representing a lower risk for 28-day all-cause mortality of patients diagnosed with severe sepsis or septic shock.       Lavender Top [[id number]] Collected: 06/12/23 1622    Specimen: Blood Updated: 06/12/23 1732     Extra Tube hold for add-on     Comment: Auto resulted       Light Blue Top [[id number]] Collected: 06/12/23 1622    Specimen: Blood Updated: 06/12/23 1732     Extra Tube Hold for add-ons.     Comment: Auto resulted       Green Top (Gel) [[id number]] Collected: 06/12/23 1622    Specimen: Blood Updated: 06/12/23 1732     Extra Tube Hold for add-ons.     Comment: Auto resulted.       Gold Top - SST [[id number]] Collected: 06/12/23 1622    Specimen: Blood Updated: 06/12/23 1732     Extra Tube Hold for add-ons.     Comment: Auto resulted.       Protime-INR [[id number]]  (Normal) Collected: 06/12/23 1622    Specimen: Blood Updated: 06/12/23 1700     Protime 13.5 Seconds      INR 1.02    Comprehensive Metabolic Panel [[id number]]  (Abnormal) Collected: 06/12/23 1622    Specimen: Blood Updated: 06/12/23 1655     Glucose 124 mg/dL      BUN 56 mg/dL      Creatinine 7.96 mg/dL      Sodium 139 mmol/L      Potassium 4.9 mmol/L      Comment: Slight hemolysis detected by analyzer. Results may be affected.        Chloride 99 mmol/L      CO2 21.0 mmol/L      Calcium 8.9 mg/dL      Total Protein 7.3 g/dL      Albumin 3.6 g/dL      ALT (SGPT) 13 U/L      AST (SGOT) 21 U/L      Alkaline Phosphatase 227 U/L      Total Bilirubin 0.3 mg/dL      Globulin 3.7 gm/dL      Comment: Calculated Result        A/G Ratio 1.0 g/dL      BUN/Creatinine Ratio 7.0     Anion Gap 19.0 mmol/L      eGFR 6.0 mL/min/1.73      Comment: <15 Indicative of kidney failure       Narrative:      GFR Normal >60  Chronic Kidney Disease  <60  Kidney Failure <15      Lipase [[id number]]  (Abnormal) Collected: 06/12/23 1622    Specimen: Blood Updated: 06/12/23 1655     Lipase 12 U/L     Lactic Acid, Plasma [[id number]]  (Normal) Collected: 06/12/23 1622    Specimen: Blood Updated: 06/12/23 1655     Lactate 0.5 mmol/L      Comment: Falsely depressed results may occur on samples drawn from patients receiving N-Acetylcysteine (NAC) or Metamizole.       CBC & Differential [[id number]]  (Abnormal) Collected: 06/12/23 1622    Specimen: Blood Updated: 06/12/23 1637    Narrative:      The following orders were created for panel order CBC & Differential.  Procedure                               Abnormality         Status                     ---------                               -----------         ------                     CBC Auto Differential[[id number]]        Abnormal            Final result                 Please view results for these tests on the individual orders.    CBC Auto Differential [[id number]]  (Abnormal) Collected: 06/12/23 1622    Specimen: Blood Updated: 06/12/23 1637     WBC 11.07 10*3/mm3      RBC 4.25 10*6/mm3      Hemoglobin 13.5 g/dL      Hematocrit 41.4 %      MCV 97.4 fL      MCH 31.8 pg      MCHC 32.6 g/dL      RDW 14.5 %      RDW-SD 52.2 fl      MPV 8.6 fL      Platelets 432 10*3/mm3      Neutrophil % 76.9 %      Lymphocyte % 7.0 %      Monocyte % 6.7 %      Eosinophil % 8.5 %      Basophil % 0.5 %      Immature Grans % 0.4 %      Neutrophils, Absolute 8.52 10*3/mm3      Lymphocytes, Absolute 0.78 10*3/mm3      Monocytes, Absolute 0.74 10*3/mm3      Eosinophils, Absolute 0.94 10*3/mm3      Basophils, Absolute 0.05 10*3/mm3      Immature Grans, Absolute 0.04 10*3/mm3      nRBC 0.0 /100 WBC           Imaging Results (Last 24 Hours)       Procedure Component Value Units Date/Time    CT Abdomen Pelvis With Contrast [[id number]] Collected: 06/12/23 1810     Updated: 06/12/23 1819    Narrative:      CT ABDOMEN PELVIS W CONTRAST    Date of  Exam: 6/12/2023 5:01 PM EDT    Indication: Status post hysterectomy, abdominal pain.    Comparison: 2/3/2023.    Technique: Axial CT images were obtained of the abdomen and pelvis following the uneventful intravenous administration of 98 cc of Isovue-370 contrast. Reconstructed coronal and sagittal images were also obtained. Automated exposure control and iterative   construction methods were used.      Findings:  There is mild abdominal and moderate pelvic ascites. There appears to be enhancement of the peritoneum. This raises suspicion of possible peritonitis. In addition, several of small bowel loops demonstrate wall thickening, especially in the left aspect of   the abdomen which may represent a reactive enteritis. There is no pneumatosis or free air. The patient has had previous gastric sleeve surgery. The appendix is normal. There is strandy density throughout the extra-abdominal and extrapelvic fat   consistent with anasarca. The liver, gallbladder, kidneys, pancreas, adrenal glands, and spleen are normal. The uterus is surgically absent. The urinary bladder is normal. There are a few atherosclerotic vascular calcifications in the abdominal aorta.   There is mild dependent atelectasis in the lung bases. There are no suspicious osteolytic or sclerotic lesions within the bony structures.      Impression:      Impression:    1. Mild abdominal and moderate pelvic ascites with enhancement of the peritoneum. This raises suspicion of possible peritonitis.  2. Several the small bowel loops demonstrate wall thickening especially in the left aspect of the abdomen which may represent a reactive enteritis.  3. The appendix is normal.  4. Anasarca.  5. Previous gastric sleeve surgery.  6. Status post hysterectomy.        Electronically Signed: Jacob Mac    6/12/2023 6:16 PM EDT    Workstation ID: [id number]          ECG/EMG Results (last 24 hours)       ** No results found for the last 24 hours. **                 Consult  Notes (last 24 hours)        Atnonio Canales MD at 23 0735          Urology Consult    Date of Admission: 2023  Date of Consult: 23    Primary Care Physician: Morgan Arguello MD  Referring Physician: Janine Mckay    Chief complaint/Reason for consultation urinary retention, acute kidney injury    Subjective     History of Present Illness Zofia is a 43-year-old white female  who had a robotic laparoscopic hysterectomy/salpingectomy 8 days ago.  Surgery was uneventful and recovery was uneventful early on.  By postop day 3 she noticed that she was gaining weight daily and her weight increased from 148 pounds baseline to up to 176 pounds as of yesterday as she had decreasing urine output decreasing bowel movements.  Yesterday she was sent over from Dr. Mckay's office to be admitted and a CT scan shows a large amount of intra-abdominal fluid and BUN and creatinine were severely elevated above baseline.  Mckenzie catheter was inserted and she immediately diuresed 4 L over the next hour and is continued to diurese overnight.  She gives no real prior urologic history including rare urinary tract infections, no stones surgeries or episodes of retention.  She denies any flank pain.  She has had no nausea or vomiting.  Review of Systems feeling a little bit better and less bloated today she has no chest pain or shortness of breath.  Otherwise complete ROS is negative except as mentioned above.    Past Medical History:  has a past medical history of Abnormal glucose, Body piercing, Dyspepsia, Dyspnea on exertion, Elevated BP, Elevated transaminase level, Fatigue, Former smoker, GERD (gastroesophageal reflux disease), Morbid obesity,  (normal spontaneous vaginal delivery), Sleep apnea, Sleep apnea, obstructive (2018), Tattoos, and Vitamin D deficiency.  G2, P2    Past Surgical History:  has a past surgical history that includes  section with tubal (); Knee arthroscopy (Right,  2013); [place] tooth extraction; Cystectomy; Gastric Sleeve (N/A, 03/14/2018); Esophagogastroduodenoscopy (N/A, 03/14/2018); Mastopexy (Bilateral, 04/30/2019); Abdominoplasty (N/A, 04/30/2019); and total laparoscopic hysterectomy salpingectomy (N/A, 6/5/2023).    Family History: family history includes Diabetes in her maternal grandmother; Hypertension in her maternal grandmother and mother; Melanoma in her maternal grandfather; No Known Problems in her father and sister; Obesity in her maternal grandmother.    Social History:  reports that she quit smoking about 10 years ago. Her smoking use included cigarettes. She has a 4.00 pack-year smoking history. She has never used smokeless tobacco. She reports that she does not currently use alcohol. She reports that she does not currently use drugs after having used the following drugs: Marijuana.  She is  and currently a student    Medications: Scheduled Meds:FLUoxetine, 10 mg, Oral, Daily  Linezolid, 600 mg, Intravenous, Q12H  piperacillin-tazobactam, 3.375 g, Intravenous, Q12H  polyethylene glycol, 17 g, Oral, Daily  sodium chloride, 10 mL, Intravenous, Q12H      Continuous Infusions:lactated ringers, 75 mL/hr, Last Rate: 75 mL/hr (06/13/23 0023)      PRN Meds:.  acetaminophen    ALPRAZolam    ondansetron **OR** ondansetron    oxyCODONE    oxyCODONE-acetaminophen    sodium chloride    sodium chloride    sodium chloride  No Known Allergies    Objective    Physical Exam:   Vital Signs have been reviewed  Physical Exam  Well-developed well-nourished white female no acute distress  HEENT: NCAT PERRLA EOMI  Heart: Regular rate and rhythm  Lungs: Clear to auscultation  Abdomen is doughy soft and mildly tender.  It does not seem to be overly distended.  Laparoscopy incisions are clean and healing nicely.  Pelvic exam is deferred.  Indwelling Mckenzie catheter is draining clear urine.  Extremities: Free of sinus Klim or edema  Neurologic: Grossly intact  Psych:  Normal    Results Reviewed:  I have personally reviewed current lab, radiology, and data and agree with results.  CT scan shows normal kidneys with no hydronephrosis.  Left ureter is easily traceable all the way down to the bladder but right ureter is not as easily traced.  Bladder is difficult to characterize could be enormous or decompressed with a large amount of ascitic fluid.    Results from last 7 days   Lab Units 06/12/23  1622   WBC 10*3/mm3 11.07*   HEMOGLOBIN g/dL 13.5   PLATELETS 10*3/mm3 432     Results from last 7 days   Lab Units 06/13/23  0444   SODIUM mmol/L 138   POTASSIUM mmol/L 5.3*   CO2 mmol/L 24.0   BUN mg/dL 37*   CREATININE mg/dL 3.94*   GLUCOSE mg/dL 122*   CALCIUM mg/dL 8.8             Generalized abdominal pain        Assessment & Plan urinary retention with acute kidney injury 7 days post robotic hysterectomy.  Very well could be urine leak from the bladder or one of the ureters.  I think the left ureter is unlikely but the right ureter is possible.  It may also be a spontaneous bladder perforation from fairly silent distention.  Renal function has improved dramatically overnight with Mckenzie catheter insertion.  That is overall a favorable sign.    I will plan a cystoscopy with bilateral retrograde pyelograms and possible stent insertion if ureteral injury (partial) is found.  Assessment & Plan    I discussed the patients findings and my recommendations with: Patient and her  and Dr. Woody Canales MD  06/13/23  07:35 EDT    Electronically signed by Antonio Canales MD at 06/13/23 4838       Mario Ledesma MD at 06/12/23 3134            Patient Care Team:  Morgan Arguello MD as PCP - General (Family Medicine)  Morgan Arguello MD as Referring Physician (Family Medicine)    Chief complaint: Abdominal distension   Abdominal pain   Acute kidney injury     History of Present Illness: Zofia Ortiz is a 43 y.o. 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 She presents GYN clinic with complaint of malaise, abdominal distention and abdominal pain patient was sent to ER for further evaluation. On admission she is noted to have acute renal failure. Cr at baseline 0.6mg/dl 1 week ago. Most recent cr~ 7.9 BUN 56. Patient noticed drop in UOP in last few days. She also admits to taking diclofenic Na for pain. Patient has no prior hx of renal issues. CT A/P on admission showed ascites and soft tissue edema. No hydro noted. Mckenzie cath was placed on admission with good UOP.     Review of Systems   Unable to perform ROS: Acuity of condition   Constitutional: Negative.    HENT: Negative.    Respiratory: Negative.    Cardiovascular: Negative.    Gastrointestinal: Positive for abdominal distention and abdominal pain.   Genitourinary: Negative.    Musculoskeletal: Negative.    Skin: Negative.    Hematological: Negative.    Psychiatric/Behavioral: Negative.         Past Medical History:   Diagnosis Date    Abnormal glucose     Body piercing     EARS ONLY    Dyspepsia     rare heartburn sx's, serum h. pyl neg    Dyspnea on exertion     Elevated BP     156/106 @ Intake, denies hx HTN.      Elevated transaminase level     Fatigue     Former smoker     Vapes now    GERD (gastroesophageal reflux disease)     Morbid obesity      (normal spontaneous vaginal delivery)     x 1 w/o complic    Sleep apnea     newly dx, but suspects she has always had it    Sleep apnea, obstructive 2018    recent sleep stuady change to bipap    Tattoos     NECK, LEFT THIGH, RIGHT ANKLE    Vitamin D deficiency    ,   Past Surgical History:   Procedure Laterality Date    ABDOMINOPLASTY N/A 2019    Procedure: ABDOMINOPLASTY;  Surgeon: Abisai Thurman MD;  Location: [facility];  Service: Plastics     SECTION WITH TUBAL  2007    CYSTECTOMY      ORAL    ENDOSCOPY N/A 2018    Procedure: ESOPHAGOGASTRODUODENOSCOPY;  Surgeon: Glen Hinkle  MD Lorri;  Location:  [facility];  Service: Bariatric    GASTRIC SLEEVE LAPAROSCOPIC N/A 03/14/2018    Procedure: GASTRIC SLEEVE LAPAROSCOPIC;  Surgeon: Glen Blackmon MD;  Location:  [facility];  Service: Bariatric    KNEE ARTHROSCOPY Right 2013    MASTOPEXY Bilateral 04/30/2019    Procedure: BREAST LIFT BILATERAL;  Surgeon: Abisai Thurman MD;  Location:  [facility];  Service: Plastics    TOTAL LAPAROSCOPIC HYSTERECTOMY SALPINGECTOMY N/A 6/5/2023    Procedure: TOTAL LAPAROSCOPIC HYSTERECTOMY BILATERAL SALPINGECTOMY WITH DAVINCI ROBOT;  Surgeon: Janine Mckay MD;  Location:  [facility];  Service: Robotics - DaVinci;  Laterality: N/A;    WISDOM TOOTH EXTRACTION      ALL FOUR   ,   Family History   Problem Relation Age of Onset    Hypertension Mother     No Known Problems Father     No Known Problems Sister     Obesity Maternal Grandmother     Hypertension Maternal Grandmother     Diabetes Maternal Grandmother     Melanoma Maternal Grandfather    ,   Social History     Socioeconomic History    Marital status:      Spouse name: Bruce Ortiz    Number of children: 2    Years of education: Bachelor's   Tobacco Use    Smoking status: Former     Packs/day: 0.50     Years: 8.00     Pack years: 4.00     Types: Cigarettes     Quit date: 2013     Years since quitting: 10.4    Smokeless tobacco: Never   Vaping Use    Vaping Use: Never used   Substance and Sexual Activity    Alcohol use: Not Currently     Comment: Occasional    Drug use: Not Currently     Types: Marijuana     Comment: 3-5 a month to sleep    Sexual activity: Defer     Partners: Male     Comment:       E-cigarette/Vaping    E-cigarette/Vaping Use Never User      E-cigarette/Vaping Substances     E-cigarette/Vaping Devices       ,   Medications Prior to Admission   Medication Sig Dispense Refill Last Dose    acetaminophen (TYLENOL) 325 MG tablet Take 2 tablets by mouth Every 4 (Four) Hours As Needed for Mild Pain. Take every 4 hours while  awake for 7 days then only as needed. 100 tablet 0     Calcium Carb-Cholecalciferol (CALCIUM 600 + D PO) Take  by mouth Daily.       Cranberry 1000 MG capsule Take  by mouth Daily.       cyclobenzaprine (FLEXERIL) 10 MG tablet Take 1 tablet by mouth 3 (Three) Times a Day As Needed for Muscle Spasms. 15 tablet 0     FLUoxetine (PROzac) 10 MG capsule PROzac       lisdexamfetamine dimesylate (Vyvanse) 10 MG capsule Vyvanse       meloxicam (MOBIC) 7.5 MG tablet Take 1 tablet by mouth Daily. Take daily x 7 days. Then only as needed. 20 tablet 0     methylPREDNISolone (MEDROL) 4 MG dose pack Take as directed on package instructions. 21 tablet 0     Multiple Vitamins-Minerals (DAILY MULTIVITAMIN PO) Take  by mouth Daily.       omeprazole (prilOSEC) 10 MG capsule Omeprazole       oxyCODONE (ROXICODONE) 5 MG immediate release tablet Take 1 tablet by mouth Every 4 (Four) Hours As Needed for Moderate Pain. 10 tablet 0     polyethylene glycol (MIRALAX) 17 GM/SCOOP powder Take 17 g by mouth Daily. Take daily x 2 weeks to prevent constipation, then as needed. May hold for loose stool 225 g 0     potassium chloride (K-DUR) 10 MEQ CR tablet Take 1 tablet by mouth 2 (Two) Times a Day.       senna (SENOKOT) 8.6 MG tablet tablet Take  by mouth Every Night.       and Scheduled Meds:  [START ON 6/13/2023] FLUoxetine, 10 mg, Oral, Daily  Linezolid, 600 mg, Intravenous, Q12H  [START ON 6/13/2023] piperacillin-tazobactam, 3.375 g, Intravenous, Q12H  [START ON 6/13/2023] polyethylene glycol, 17 g, Oral, Daily  sodium chloride, 10 mL, Intravenous, Q12H        Objective     Vital Signs  Temp:  [97.1 °F (36.2 °C)-98.1 °F (36.7 °C)] 97.1 °F (36.2 °C)  Heart Rate:  [] 54  Resp:  [16-18] 16  BP: (122-130)/(77-85) 130/85    I/O this shift:  In: -   Out: 4000 [Urine:4000]  No intake/output data recorded.    Physical Exam  Constitutional:       General: She is not in acute distress.     Appearance: Normal appearance. She is not ill-appearing.    HENT:      Head: Normocephalic and atraumatic.      Nose: Nose normal.      Mouth/Throat:      Mouth: Mucous membranes are moist.   Eyes:      Extraocular Movements: Extraocular movements intact.      Pupils: Pupils are equal, round, and reactive to light.   Cardiovascular:      Rate and Rhythm: Normal rate and regular rhythm.      Pulses: Normal pulses.      Heart sounds: Normal heart sounds. No murmur heard.    No friction rub.   Pulmonary:      Effort: Pulmonary effort is normal. No respiratory distress.      Breath sounds: Normal breath sounds. No stridor.   Abdominal:      General: Abdomen is flat. There is distension.   Musculoskeletal:      Cervical back: Normal range of motion.      Right lower leg: No edema.      Left lower leg: No edema.   Skin:     General: Skin is warm.   Neurological:      General: No focal deficit present.      Mental Status: She is alert and oriented to person, place, and time. Mental status is at baseline.   Psychiatric:         Mood and Affect: Mood normal.         Behavior: Behavior normal.         Results Review:    I reviewed the patient's new clinical results.    WBC WBC   Date Value Ref Range Status   06/12/2023 11.07 (H) 3.40 - 10.80 10*3/mm3 Final      HGB Hemoglobin   Date Value Ref Range Status   06/12/2023 13.5 12.0 - 15.9 g/dL Final      HCT Hematocrit   Date Value Ref Range Status   06/12/2023 41.4 34.0 - 46.6 % Final      Platlets No results found for: LABPLAT   MCV MCV   Date Value Ref Range Status   06/12/2023 97.4 (H) 79.0 - 97.0 fL Final          Sodium Sodium   Date Value Ref Range Status   06/12/2023 139 136 - 145 mmol/L Final      Potassium Potassium   Date Value Ref Range Status   06/12/2023 4.9 3.5 - 5.2 mmol/L Final     Comment:     Slight hemolysis detected by analyzer. Results may be affected.      Chloride Chloride   Date Value Ref Range Status   06/12/2023 99 98 - 107 mmol/L Final      CO2 CO2   Date Value Ref Range Status   06/12/2023 21.0 (L) 22.0 -  29.0 mmol/L Final      BUN BUN   Date Value Ref Range Status   06/12/2023 56 (H) 6 - 20 mg/dL Final      Creatinine Creatinine   Date Value Ref Range Status   06/12/2023 7.96 (H) 0.57 - 1.00 mg/dL Final      Calcium Calcium   Date Value Ref Range Status   06/12/2023 8.9 8.6 - 10.5 mg/dL Final      PO4 No results found for: CAPO4   Albumin Albumin   Date Value Ref Range Status   06/12/2023 3.6 3.5 - 5.2 g/dL Final      Magnesium No results found for: MG   Uric Acid No results found for: URICACID         Assessment & Plan       Generalized abdominal pain      Assessment & Plan     Acute renal failure: Baseline cr ~ 0.6 mg/dl. Cr 7.9 on admission. Suspect etiology post renal Benton due to urinary retention. Other possibility NSAID use causing tubular damage.     Urinary retention: Following hysterectomy. CT A/P didn't show any hydro. 2 liter UOP since anderson cath insertion on admission. May need cystoscopy     Third spacing edema: Noted on CT A/P.     Met acidosis: Due to BENTON    Hysterectomy:7 days ago.     Recs  Acute kidney injury due to above mentioned risk factor. Monitor for post obstructive diuresis. Start LR @ 75cc/hr. Encourage po intake. Check Urine Na, cl and creatinine. Daily labs. Strict I/O. Dose meds to eGFR. Avoid nephrotoxic agents.       I discussed the patients findings and my recommendations with patient    Mario Ledesma MD  06/12/23  23:27 EDT          Electronically signed by Mario Ledesma MD at 06/12/23 2193

## 2023-06-13 NOTE — ANESTHESIA PROCEDURE NOTES
Airway  Urgency: elective    Date/Time: 6/13/2023 3:15 PM  Airway not difficult    General Information and Staff    Patient location during procedure: OR  CRNA/CAA: Juanjose Hogue CRNA    Indications and Patient Condition  Indications for airway management: airway protection    Preoxygenated: yes  MILS not maintained throughout  Mask difficulty assessment: 1 - vent by mask    Final Airway Details  Final airway type: supraglottic airway      Successful airway: I-gel  Size 4     Number of attempts at approach: 1  Assessment: lips, teeth, and gum same as pre-op and atraumatic intubation    Additional Comments  Negative epigastric sounds, Breath sound equal bilaterally with symmetric chest rise and fall

## 2023-06-13 NOTE — PLAN OF CARE
Goal Outcome Evaluation:  Plan of Care Reviewed With: patient        Progress: no change  Outcome Evaluation: VSS on RA. Pain managed w/prn dilaudid x1, prn oxycodone, & prn percocet. Anxiety managed w/prn xanax x1. Nausea managed w/prn zofran x1. Patient tolerated regular diet, has been NPO since midnight. Significant UOP through patent anderson. No BM this shift. Old incisions covered w/steri-strips, abdominal binder in place per patient request for comfort.  at bedside, attentive to patient. Calm, cooperative, pleasant. Will continue to monitor.

## 2023-06-14 LAB
ANION GAP SERPL CALCULATED.3IONS-SCNC: 8 MMOL/L (ref 5–15)
BUN SERPL-MCNC: 11 MG/DL (ref 6–20)
BUN/CREAT SERPL: 19 (ref 7–25)
CALCIUM SPEC-SCNC: 8.4 MG/DL (ref 8.6–10.5)
CHLORIDE SERPL-SCNC: 105 MMOL/L (ref 98–107)
CO2 SERPL-SCNC: 27 MMOL/L (ref 22–29)
CREAT SERPL-MCNC: 0.58 MG/DL (ref 0.57–1)
EGFRCR SERPLBLD CKD-EPI 2021: 115.3 ML/MIN/1.73
GLUCOSE SERPL-MCNC: 220 MG/DL (ref 65–99)
POTASSIUM SERPL-SCNC: 5.1 MMOL/L (ref 3.5–5.2)
SODIUM SERPL-SCNC: 140 MMOL/L (ref 136–145)

## 2023-06-14 PROCEDURE — 25010000002 PIPERACILLIN SOD-TAZOBACTAM PER 1 G: Performed by: UROLOGY

## 2023-06-14 PROCEDURE — 25010000002 LINEZOLID 600 MG/300ML SOLUTION: Performed by: UROLOGY

## 2023-06-14 PROCEDURE — 80048 BASIC METABOLIC PNL TOTAL CA: CPT | Performed by: UROLOGY

## 2023-06-14 RX ADMIN — ALPRAZOLAM 0.25 MG: 0.25 TABLET ORAL at 20:23

## 2023-06-14 RX ADMIN — OXYCODONE HYDROCHLORIDE AND ACETAMINOPHEN 1 TABLET: 10; 325 TABLET ORAL at 15:20

## 2023-06-14 RX ADMIN — Medication 10 ML: at 20:23

## 2023-06-14 RX ADMIN — LINEZOLID 600 MG: 600 INJECTION, SOLUTION INTRAVENOUS at 21:36

## 2023-06-14 RX ADMIN — OXYCODONE 5 MG: 5 TABLET ORAL at 06:50

## 2023-06-14 RX ADMIN — FLUOXETINE 10 MG: 10 CAPSULE ORAL at 08:56

## 2023-06-14 RX ADMIN — OXYCODONE HYDROCHLORIDE AND ACETAMINOPHEN 1 TABLET: 10; 325 TABLET ORAL at 09:01

## 2023-06-14 RX ADMIN — OXYCODONE 5 MG: 5 TABLET ORAL at 20:23

## 2023-06-14 RX ADMIN — POLYETHYLENE GLYCOL 3350 17 G: 17 POWDER, FOR SOLUTION ORAL at 08:56

## 2023-06-14 RX ADMIN — OXYCODONE HYDROCHLORIDE AND ACETAMINOPHEN 1 TABLET: 10; 325 TABLET ORAL at 23:41

## 2023-06-14 RX ADMIN — OXYCODONE 5 MG: 5 TABLET ORAL at 01:31

## 2023-06-14 RX ADMIN — TAZOBACTAM SODIUM AND PIPERACILLIN SODIUM 3.38 G: 375; 3 INJECTION, SOLUTION INTRAVENOUS at 01:31

## 2023-06-14 RX ADMIN — OXYCODONE 5 MG: 5 TABLET ORAL at 11:10

## 2023-06-14 RX ADMIN — ALPRAZOLAM 0.25 MG: 0.25 TABLET ORAL at 11:10

## 2023-06-14 RX ADMIN — ACETAMINOPHEN 650 MG: 325 TABLET ORAL at 04:51

## 2023-06-14 RX ADMIN — TAZOBACTAM SODIUM AND PIPERACILLIN SODIUM 3.38 G: 375; 3 INJECTION, SOLUTION INTRAVENOUS at 14:46

## 2023-06-14 RX ADMIN — LINEZOLID 600 MG: 600 INJECTION, SOLUTION INTRAVENOUS at 09:02

## 2023-06-14 NOTE — PROGRESS NOTES
"Daily Progress Note    [facility]    Subjective: Awake and alert this morning.  Feeling better less bloated.  Ate dinner well last night.      Objective:     /69 (BP Location: Left arm, Patient Position: Lying)   Pulse 78   Temp 97.5 °F (36.4 °C) (Temporal)   Resp 18   Ht 167.6 cm (66\")   Wt 80 kg (176 lb 4.8 oz)   LMP 04/17/2019   SpO2 95%   BMI 28.46 kg/m²       Intake/Output Summary (Last 24 hours) at 6/14/2023 0713  Last data filed at 6/14/2023 0300  Gross per 24 hour   Intake 268 ml   Output 675 ml   Net -407 ml       Review of Systems:    Physical Exam:   General Appearance: alert, appears stated age and cooperative  Head: normocephalic, without obvious abnormality and atraumatic  Lungs respirations regular  Abdomen no masses and soft non tender  Extremities moves extremities well, no edema, no cyanosis and no redness      Lab Results (last 24 hours)       Procedure Component Value Units Date/Time    Blood Culture - Blood, Arm, Right [[id number]]  (Normal) Collected: 06/12/23 2029    Specimen: Blood from Arm, Right Updated: 06/13/23 2101     Blood Culture No growth at 24 hours    Blood Culture - Blood, Arm, Right [[id number]]  (Normal) Collected: 06/12/23 1938    Specimen: Blood from Arm, Right Updated: 06/13/23 2015     Blood Culture No growth at 24 hours    CBC & Differential [[id number]]  (Abnormal) Collected: 06/13/23 1830    Specimen: Blood Updated: 06/13/23 1852    Narrative:      The following orders were created for panel order CBC & Differential.  Procedure                               Abnormality         Status                     ---------                               -----------         ------                     CBC Auto Differential[[id number]]        Abnormal            Final result                 Please view results for these tests on the individual orders.    CBC Auto Differential [[id number]]  (Abnormal) Collected: 06/13/23 1830    Specimen: Blood " Updated: 06/13/23 1852     WBC 8.80 10*3/mm3      RBC 3.75 10*6/mm3      Hemoglobin 11.9 g/dL      Hematocrit 36.3 %      MCV 96.8 fL      MCH 31.7 pg      MCHC 32.8 g/dL      RDW 14.5 %      RDW-SD 51.3 fl      MPV 8.4 fL      Platelets 426 10*3/mm3      Neutrophil % 90.9 %      Lymphocyte % 3.3 %      Monocyte % 1.5 %      Eosinophil % 3.8 %      Basophil % 0.2 %      Immature Grans % 0.3 %      Neutrophils, Absolute 8.00 10*3/mm3      Lymphocytes, Absolute 0.29 10*3/mm3      Monocytes, Absolute 0.13 10*3/mm3      Eosinophils, Absolute 0.33 10*3/mm3      Basophils, Absolute 0.02 10*3/mm3      Immature Grans, Absolute 0.03 10*3/mm3      nRBC 0.0 /100 WBC     Creatinine Urine Random (kidney function) GFR component - Urine, Clean Catch [[id number]] Collected: 06/12/23 1946    Specimen: Urine, Clean Catch Updated: 06/13/23 0944     Creatinine, Urine 49.0 mg/dL     Narrative:      Reference intervals for random urine have not been established.  Clinical usage is dependent upon physician's interpretation in combination with other laboratory tests.               Imaging Results (Last 24 Hours)       Procedure Component Value Units Date/Time    FL C Arm During Surgery [[id number]] Resulted: 06/13/23 1535     Updated: 06/13/23 1535    Narrative:      This procedure was auto-finalized with no dictation required.            Assessment/Plan: Postop day 1 from cystoscopy with bilateral retrograde pyelograms.  Bladder perforation/urinary ascites being managed conservatively with Mckenzie catheter.  Plan to ambulate more today and continue advancing diet and activity levels.  Would recommend continuing prophylactic antibiotic to avoid possibility of peritonitis/urinary tract infection.            Diagnosis Plan   1. Generalized abdominal pain        2. Recent major surgery        3. Peritonitis due to urine              Antonio Canales MD - 6/14/2023, 07:13 EDT

## 2023-06-14 NOTE — PLAN OF CARE
Goal Outcome Evaluation:Pain controlled with PO medication.  Anti-anxiety medication given @ noon. Able to ambulate in cerda without assist.  Taking PO foods / fluids without experiencing nausea..  Expect discharge tomorrw.  Continue to monitor.

## 2023-06-14 NOTE — PROGRESS NOTES
" Edgar Ortiz  : 1980  MRN: [id number]  CSN: [id number]                Date 2023       Subjective   Zofia feels well today.  Reports abdominal distention is resolving. Denies significant pain and tolerating regular diet.           Objective   /84 (BP Location: Left arm, Patient Position: Sitting)   Pulse 88   Temp 97 °F (36.1 °C) (Temporal)   Resp 18   Ht 167.6 cm (66\")   Wt 77.1 kg (169 lb 15.2 oz)   LMP 2019   SpO2 91%   BMI 27.43 kg/m²   General: well developed; well nourished  no acute distress   Heart: Not performed.   Lungs: breathing is unlabored   Abdomen: soft, non-tender; no masses  no umbilical or inguinal hernias are present  no hepato-splenomegaly  incision is clean, dry, intact, and without drainage       Labs  BMP:   Lab Results   Component Value Date     2023    K 5.1 2023     2023    CO2 27.0 2023    BUN 11 2023    CREATININE 0.58 2023     CBC w/ diff:   Lab Results   Component Value Date    WBC 8.80 2023    NEUTRORELPCT 90.9 (H) 2023    AUTOIGPER 0.3 2023    LYMPHORELPCT 3.3 (L) 2023    MONORELPCT 1.5 (L) 2023    EOSRELPCT 3.8 2023    BASORELPCT 0.2 2023    HGB 11.9 (L) 2023    HCT 36.3 2023    MCV 96.8 2023    RDW 14.5 2023     2023     Urine Culture: No results found for: URINECX    Assessment   1.  POD1, cystoscopy with bilateral retrograde pyelogram.   2.  Post op day 9, robotic hysterectomy.   3.  Bladder injury with urinary ascites. Abdominal distention resolving, creatinine and WBC have normalized.     Plan:   Discussed findings and general management. Questions answered.  Continue indwelling catheter.    Serial electrolytes and creatinine.  Continue prophylactic antibiotics.           Janine Mckay MD  2023  08:10 EDT     "

## 2023-06-14 NOTE — CASE MANAGEMENT/SOCIAL WORK
Continued Stay Note  [facility]     Patient Name: Zofia Ortiz  MRN: [id number]  Today's Date: 6/14/2023    Admit Date: 6/12/2023    Plan: Home   Discharge Plan       Row Name 06/14/23 1724       Plan    Plan Home    Plan Comments Case Management continuing to follow for all needs, discharge planning when medically stable.  Discussed in unit multidisciplinary rounds this morning.  Janine Jama, Ext. [phone]    Final Discharge Disposition Code 01 - home or self-care                   Discharge Codes    No documentation.                 Expected Discharge Date and Time       Expected Discharge Date Expected Discharge Time    Jun 16, 2023               STEPHANIE Vanegas

## 2023-06-14 NOTE — PLAN OF CARE
Goal Outcome Evaluation:  Plan of Care Reviewed With: patient        Progress: improving  Outcome Evaluation: VSS on RA. Pain managed w/prn tylenol, prn oxycodone, & prn percocet. Anxiety managed w/prn xanax x1. Patient denies nausea, tolerating regular diet w/good apetite. Adequate UOP through patent anderson. No BM this shift, although patient endorses feeling like she needs to go. Old incisions covered w/steri-strips. Slept well throughout night. Stand-by assist.  at bedside. Calm, cooperative, pleasant. Will continue to monitor.

## 2023-06-14 NOTE — PROGRESS NOTES
"   LOS: 2 days    Patient Care Team:  Morgan Arguello MD as PCP - General (Family Medicine)  Morgan Arguello MD as Referring Physician (Family Medicine)    Reason for consult - JESSICA and urinary retention     Subjective     Interval History:     No acute events overnight. No new complaints       Review of Systems:   No CP or SOA , fever, chills, rigors, rash, N/V, Constipation.       Objective     Vital Sign Min/Max for last 24 hours  Temp  Min: 96.8 °F (36 °C)  Max: 98.3 °F (36.8 °C)   BP  Min: 93/64  Max: 114/84   Pulse  Min: 63  Max: 104   Resp  Min: 16  Max: 18   SpO2  Min: 91 %  Max: 97 %   Flow (L/min)  Min: 4  Max: 4   Weight  Min: 77.1 kg (169 lb 15.2 oz)  Max: 80 kg (176 lb 4.8 oz)     Flowsheet Rows      Flowsheet Row First Filed Value   Admission Height 167.6 cm (66\") Documented at 06/12/2023 1530   Admission Weight 78 kg (172 lb) Documented at 06/12/2023 1530            No intake/output data recorded.  I/O last 3 completed shifts:  In: 729 [P.O.:118; I.V.:461; IV Piggyback:150]  Out: 5975 [Urine:5975]    Physical Exam:    Gen: Alert, NAD   HENT: NC, AT, EOMI   NECK: Supple, no JVD, Trachea midline   LUNGS: CTA bilaterally, non labored respirtation   CVS: S1/S2 audible, RRR, no murmur   Abd: Soft, NT, ND, BS+   Ext: No pedal edema, no cyanosis   CNS: Alert, No focal deficit noted grossly  Psy: Cooperative  Skin: Warm, dry and intact      WBC WBC   Date Value Ref Range Status   06/13/2023 8.80 3.40 - 10.80 10*3/mm3 Final   06/12/2023 11.07 (H) 3.40 - 10.80 10*3/mm3 Final      HGB Hemoglobin   Date Value Ref Range Status   06/13/2023 11.9 (L) 12.0 - 15.9 g/dL Final   06/12/2023 13.5 12.0 - 15.9 g/dL Final      HCT Hematocrit   Date Value Ref Range Status   06/13/2023 36.3 34.0 - 46.6 % Final   06/12/2023 41.4 34.0 - 46.6 % Final      Platlets No results found for: LABPLAT   MCV MCV   Date Value Ref Range Status   06/13/2023 96.8 79.0 - 97.0 fL Final   06/12/2023 97.4 (H) 79.0 - 97.0 fL Final "          Sodium Sodium   Date Value Ref Range Status   06/14/2023 140 136 - 145 mmol/L Final   06/13/2023 138 136 - 145 mmol/L Final   06/12/2023 139 136 - 145 mmol/L Final      Potassium Potassium   Date Value Ref Range Status   06/14/2023 5.1 3.5 - 5.2 mmol/L Final     Comment:     Slight hemolysis detected by analyzer. Results may be affected.   06/13/2023 5.3 (H) 3.5 - 5.2 mmol/L Final   06/12/2023 4.9 3.5 - 5.2 mmol/L Final     Comment:     Slight hemolysis detected by analyzer. Results may be affected.      Chloride Chloride   Date Value Ref Range Status   06/14/2023 105 98 - 107 mmol/L Final   06/13/2023 105 98 - 107 mmol/L Final   06/12/2023 99 98 - 107 mmol/L Final      CO2 CO2   Date Value Ref Range Status   06/14/2023 27.0 22.0 - 29.0 mmol/L Final   06/13/2023 24.0 22.0 - 29.0 mmol/L Final   06/12/2023 21.0 (L) 22.0 - 29.0 mmol/L Final      BUN BUN   Date Value Ref Range Status   06/14/2023 11 6 - 20 mg/dL Final   06/13/2023 37 (H) 6 - 20 mg/dL Final   06/12/2023 56 (H) 6 - 20 mg/dL Final      Creatinine Creatinine   Date Value Ref Range Status   06/14/2023 0.58 0.57 - 1.00 mg/dL Final   06/13/2023 3.94 (H) 0.57 - 1.00 mg/dL Final   06/12/2023 7.96 (H) 0.57 - 1.00 mg/dL Final      Calcium Calcium   Date Value Ref Range Status   06/14/2023 8.4 (L) 8.6 - 10.5 mg/dL Final   06/13/2023 8.8 8.6 - 10.5 mg/dL Final   06/12/2023 8.9 8.6 - 10.5 mg/dL Final      PO4 No results found for: CAPO4   Albumin Albumin   Date Value Ref Range Status   06/12/2023 3.6 3.5 - 5.2 g/dL Final      Magnesium No results found for: MG   Uric Acid No results found for: URICACID        Results Review:     I reviewed the patient's new clinical results.    FLUoxetine, 10 mg, Oral, Daily  Linezolid, 600 mg, Intravenous, Q12H  piperacillin-tazobactam, 3.375 g, Intravenous, Q12H  polyethylene glycol, 17 g, Oral, Daily  sodium chloride, 10 mL, Intravenous, Q12H      sodium chloride, 9 mL/hr, Last Rate: 9 mL/hr (06/13/23  1512)        Medication Review: yes    Assessment & Plan       Generalized abdominal pain    1- JESSICA- non oliguric - Urinary retention - sp anderson catheter -resolved.   2- Urinary retention   3- Hx of hysterectomy - 1 week back   4- Bladder perforation- with urinary ascites s/p cystoscopy - urology following     Plan:  - Continue with current   - Monitor I/O   - Avoid nephrotoxic agents.   - Renal diet   - Adjust meds per renal function     Will sign off.         Artem Lozano MD  06/14/23  09:08 EDT

## 2023-06-15 VITALS
BODY MASS INDEX: 27.64 KG/M2 | HEIGHT: 66 IN | TEMPERATURE: 97.4 F | WEIGHT: 172 LBS | OXYGEN SATURATION: 95 % | DIASTOLIC BLOOD PRESSURE: 68 MMHG | RESPIRATION RATE: 16 BRPM | HEART RATE: 70 BPM | SYSTOLIC BLOOD PRESSURE: 116 MMHG

## 2023-06-15 LAB
ANION GAP SERPL CALCULATED.3IONS-SCNC: 7 MMOL/L (ref 5–15)
BUN SERPL-MCNC: 6 MG/DL (ref 6–20)
BUN/CREAT SERPL: 14.6 (ref 7–25)
CALCIUM SPEC-SCNC: 8.1 MG/DL (ref 8.6–10.5)
CHLORIDE SERPL-SCNC: 110 MMOL/L (ref 98–107)
CO2 SERPL-SCNC: 25 MMOL/L (ref 22–29)
CREAT SERPL-MCNC: 0.41 MG/DL (ref 0.57–1)
EGFRCR SERPLBLD CKD-EPI 2021: 125.4 ML/MIN/1.73
GLUCOSE SERPL-MCNC: 103 MG/DL (ref 65–99)
POTASSIUM SERPL-SCNC: 4.5 MMOL/L (ref 3.5–5.2)
SODIUM SERPL-SCNC: 142 MMOL/L (ref 136–145)

## 2023-06-15 PROCEDURE — 25010000002 LINEZOLID 600 MG/300ML SOLUTION: Performed by: UROLOGY

## 2023-06-15 PROCEDURE — 80048 BASIC METABOLIC PNL TOTAL CA: CPT | Performed by: OBSTETRICS & GYNECOLOGY

## 2023-06-15 PROCEDURE — 25010000002 PIPERACILLIN SOD-TAZOBACTAM PER 1 G: Performed by: UROLOGY

## 2023-06-15 RX ORDER — CEPHALEXIN 500 MG/1
500 CAPSULE ORAL 4 TIMES DAILY
Qty: 40 CAPSULE | Refills: 0 | Status: SHIPPED | OUTPATIENT
Start: 2023-06-15 | End: 2023-06-15 | Stop reason: SDUPTHER

## 2023-06-15 RX ORDER — OXYCODONE HYDROCHLORIDE 5 MG/1
5 TABLET ORAL EVERY 4 HOURS PRN
Qty: 20 TABLET | Refills: 0 | Status: SHIPPED | OUTPATIENT
Start: 2023-06-15 | End: 2023-06-15 | Stop reason: SDUPTHER

## 2023-06-15 RX ORDER — OXYCODONE HYDROCHLORIDE 5 MG/1
5 TABLET ORAL EVERY 4 HOURS PRN
Qty: 20 TABLET | Refills: 0 | Status: SHIPPED | OUTPATIENT
Start: 2023-06-15

## 2023-06-15 RX ORDER — OXYBUTYNIN CHLORIDE 5 MG/1
5 TABLET ORAL 3 TIMES DAILY
Qty: 42 TABLET | Refills: 0 | Status: SHIPPED | OUTPATIENT
Start: 2023-06-15 | End: 2023-06-15 | Stop reason: SDUPTHER

## 2023-06-15 RX ORDER — OXYBUTYNIN CHLORIDE 5 MG/1
5 TABLET ORAL 3 TIMES DAILY
Qty: 42 TABLET | Refills: 0 | Status: SHIPPED | OUTPATIENT
Start: 2023-06-15 | End: 2023-06-29

## 2023-06-15 RX ORDER — CEPHALEXIN 500 MG/1
500 CAPSULE ORAL 4 TIMES DAILY
Qty: 40 CAPSULE | Refills: 0 | Status: SHIPPED | OUTPATIENT
Start: 2023-06-15 | End: 2023-06-25

## 2023-06-15 RX ADMIN — OXYCODONE HYDROCHLORIDE AND ACETAMINOPHEN 1 TABLET: 10; 325 TABLET ORAL at 10:36

## 2023-06-15 RX ADMIN — POLYETHYLENE GLYCOL 3350 17 G: 17 POWDER, FOR SOLUTION ORAL at 08:35

## 2023-06-15 RX ADMIN — OXYCODONE 5 MG: 5 TABLET ORAL at 05:58

## 2023-06-15 RX ADMIN — ALPRAZOLAM 0.25 MG: 0.25 TABLET ORAL at 05:34

## 2023-06-15 RX ADMIN — Medication 10 ML: at 09:15

## 2023-06-15 RX ADMIN — TAZOBACTAM SODIUM AND PIPERACILLIN SODIUM 3.38 G: 375; 3 INJECTION, SOLUTION INTRAVENOUS at 02:08

## 2023-06-15 RX ADMIN — FLUOXETINE 10 MG: 10 CAPSULE ORAL at 08:35

## 2023-06-15 RX ADMIN — LINEZOLID 600 MG: 600 INJECTION, SOLUTION INTRAVENOUS at 09:29

## 2023-06-15 NOTE — PAYOR COMM NOTE
"Zofia Ortiz (43 y.o. Female)     [id number]     Janine Stahl RN  Utilization Review  [phone]  [phone]        DC date 6/15/2023      Date of Birth   1980    Social Security Number       Address   [address] [address]    Home Phone   [phone]    MRN   [id number]       Caodaism   None    Marital Status                               Admission Date   23    Admission Type   Emergency    Admitting Provider   Janine Mckay MD    Attending Provider   Janine Mckay MD    Department, Room/Bed   [address], [address]       Discharge Date       Discharge Disposition   Home or Self Care    Discharge Destination                                 Attending Provider: Janine Mckay MD    Allergies: No Known Allergies    Isolation: None   Infection: None   Code Status: CPR    Ht: 167.6 cm (66\")   Wt: 78 kg (172 lb)    Admission Cmt: None   Principal Problem: Generalized abdominal pain [R10.84]                   Active Insurance as of 2023       Primary Coverage       Payor Plan Insurance Group Employer/Plan Group    [employer]HE[employer] BLUE CROSS AN[employer]M BLUE CROSS BLUE SHIELD PPO [id number]       Payor Plan Address Payor Plan Phone Number Payor Plan Fax Number Effective Dates    [address] [phone]  2016 - None Entered    [place] [address]         Subscriber Name Subscriber Birth Date Member ID       BRUCE ORTIZ 9/3/1977 [id number]                     Emergency Contacts        (Rel.) Home Phone Work Phone Mobile Phone    Bruce Ortiz (Spouse) [phone] -- [phone]                 Physician Progress Notes (last 24 hours)        Janine Mckay MD at 06/15/23 0752          [facility]  Zofia Ortiz  : 1980  MRN: [id number]  CSN: [id number]        Subjective   Patient feeling well today. Reports a bladder spasm yesterday when she had a bowel movement. She is eager to go home.      Objective " "  /68 (BP Location: Right arm, Patient Position: Lying)   Pulse 70   Temp 97.4 °F (36.3 °C) (Temporal)   Resp 16   Ht 167.6 cm (66\")   Wt 78 kg (172 lb)   LMP 04/17/2019   SpO2 95%   BMI 27.76 kg/m²   General: well developed; well nourished  no acute distress   Heart: Not performed.   Lungs: breathing is unlabored   Abdomen: soft, non-tender; no masses  no umbilical or inguinal hernias are present  no hepato-splenomegaly  incision is clean, dry, intact, and without drainage   Pelvis:: Not performed.   Labs  BMP:   Lab Results   Component Value Date     06/14/2023    K 5.1 06/14/2023     06/14/2023    CO2 27.0 06/14/2023    BUN 11 06/14/2023    CREATININE 0.58 06/14/2023     CBC w/ diff:   Lab Results   Component Value Date    WBC 8.80 06/13/2023    NEUTRORELPCT 90.9 (H) 06/13/2023    AUTOIGPER 0.3 06/13/2023    LYMPHORELPCT 3.3 (L) 06/13/2023    MONORELPCT 1.5 (L) 06/13/2023    EOSRELPCT 3.8 06/13/2023    BASORELPCT 0.2 06/13/2023    HGB 11.9 (L) 06/13/2023    HCT 36.3 06/13/2023    MCV 96.8 06/13/2023    RDW 14.5 06/13/2023     06/13/2023     Urine Culture: No results found for: URINECX          Assessment   1.  POD 2, cystoscopy with bilateral retrograde pyelogram.   2.  Post op day 10, robotic hysterectomy.   3.  Bladder injury with urinary ascites. Abdominal distention resolving, creatinine and WBC have normalized.     Plan:   DC home.  Continue indwelling catheter.    Prophylactic keflex.  Urology follow up 1 week.         Janine Mckay MD  6/15/2023  07:56 EDT      Electronically signed by Janine Mckay MD at 06/15/23 0800       Antonio Canales MD at 06/15/23 0744          Daily Progress Note    Patient: Zofia Winnie Ortiz  Hospital Day: 3    Subjective: Awake and alert.  Feels better.  Had normal bowel movement.  Ambulating eating well.    Objective:     /68 (BP Location: Right arm, Patient Position: Lying)   Pulse 70   Temp 97.4 °F (36.3 °C) (Temporal)   " "Resp 16   Ht 167.6 cm (66\")   Wt 78 kg (172 lb)   LMP 04/17/2019   SpO2 95%   BMI 27.76 kg/m²       Intake/Output Summary (Last 24 hours) at 6/15/2023 0744  Last data filed at 6/15/2023 0525  Gross per 24 hour   Intake --   Output 650 ml   Net -650 ml       Review of Systems:    Physical Exam:   General Appearance: alert, appears stated age and cooperative  Head: normocephalic, without obvious abnormality and atraumatic  Lungs respirations regular  Abdomen no masses and soft non tender  Extremities moves extremities well, no edema, no cyanosis and no redness      Lab Results (last 24 hours)       Procedure Component Value Units Date/Time    Blood Culture - Blood, Arm, Right [[id number]]  (Normal) Collected: 06/12/23 2029    Specimen: Blood from Arm, Right Updated: 06/14/23 2100     Blood Culture No growth at 2 days    Blood Culture - Blood, Arm, Right [[id number]]  (Normal) Collected: 06/12/23 1938    Specimen: Blood from Arm, Right Updated: 06/14/23 2015     Blood Culture No growth at 2 days    Basic Metabolic Panel [[id number]]  (Abnormal) Collected: 06/14/23 0715    Specimen: Blood Updated: 06/14/23 0809     Glucose 220 mg/dL      BUN 11 mg/dL      Creatinine 0.58 mg/dL      Sodium 140 mmol/L      Potassium 5.1 mmol/L      Comment: Slight hemolysis detected by analyzer. Results may be affected.        Chloride 105 mmol/L      CO2 27.0 mmol/L      Calcium 8.4 mg/dL      BUN/Creatinine Ratio 19.0     Anion Gap 8.0 mmol/L      eGFR 115.3 mL/min/1.73     Narrative:      GFR Normal >60  Chronic Kidney Disease <60  Kidney Failure <15            Assessment/Plan: Bladder perforation with urinary ascites.  Seems to be improving daily.  Wants to go home today which I think is satisfactory.  Will arrange for a CT cystogram in a week.  Home on prophylactic antibiotic (Keflex)      Generalized abdominal pain        Diagnosis Plan   1. Generalized abdominal pain        2. Recent major surgery        3. Peritonitis due to " urine              Antonio Canales MD - 6/15/2023, 07:44 EDT        Electronically signed by Antonio Canales MD at 06/15/23 0746       Consult Notes (last 24 hours)  Notes from 06/14/23 1032 through 06/15/23 1032   No notes of this type exist for this encounter.

## 2023-06-15 NOTE — PROGRESS NOTES
"Daily Progress Note    [facility]    Subjective: Awake and alert.  Feels better.  Had normal bowel movement.  Ambulating eating well.    Objective:     /68 (BP Location: Right arm, Patient Position: Lying)   Pulse 70   Temp 97.4 °F (36.3 °C) (Temporal)   Resp 16   Ht 167.6 cm (66\")   Wt 78 kg (172 lb)   LMP 04/17/2019   SpO2 95%   BMI 27.76 kg/m²       Intake/Output Summary (Last 24 hours) at 6/15/2023 0744  Last data filed at 6/15/2023 0525  Gross per 24 hour   Intake --   Output 650 ml   Net -650 ml       Review of Systems:    Physical Exam:   General Appearance: alert, appears stated age and cooperative  Head: normocephalic, without obvious abnormality and atraumatic  Lungs respirations regular  Abdomen no masses and soft non tender  Extremities moves extremities well, no edema, no cyanosis and no redness      Lab Results (last 24 hours)       Procedure Component Value Units Date/Time    Blood Culture - Blood, Arm, Right [[id number]]  (Normal) Collected: 06/12/23 2029    Specimen: Blood from Arm, Right Updated: 06/14/23 2100     Blood Culture No growth at 2 days    Blood Culture - Blood, Arm, Right [[id number]]  (Normal) Collected: 06/12/23 1938    Specimen: Blood from Arm, Right Updated: 06/14/23 2015     Blood Culture No growth at 2 days    Basic Metabolic Panel [[id number]]  (Abnormal) Collected: 06/14/23 0715    Specimen: Blood Updated: 06/14/23 0809     Glucose 220 mg/dL      BUN 11 mg/dL      Creatinine 0.58 mg/dL      Sodium 140 mmol/L      Potassium 5.1 mmol/L      Comment: Slight hemolysis detected by analyzer. Results may be affected.        Chloride 105 mmol/L      CO2 27.0 mmol/L      Calcium 8.4 mg/dL      BUN/Creatinine Ratio 19.0     Anion Gap 8.0 mmol/L      eGFR 115.3 mL/min/1.73     Narrative:      GFR Normal >60  Chronic Kidney Disease <60  Kidney Failure <15            Assessment/Plan: Bladder perforation with urinary ascites.  Seems to be improving " daily.  Wants to go home today which I think is satisfactory.  Will arrange for a CT cystogram in a week.  Home on prophylactic antibiotic (Keflex)      Generalized abdominal pain        Diagnosis Plan   1. Generalized abdominal pain        2. Recent major surgery        3. Peritonitis due to urine              Antonio Canales MD - 6/15/2023, 07:44 EDT

## 2023-06-15 NOTE — PLAN OF CARE
Goal Outcome Evaluation:  Plan of Care Reviewed With: patient        Progress: improving  Outcome Evaluation: VSS on RA. Pain managed w/prn oxycodone & prn percocet. Anxiety managed w/prn xanax. Patient denies nausea, tolerating regular diet w/good apetite. Adequate UOP through patent anderson. No BM this shift. Old lap sites open to air. Slept on-and-off overnight. Stand-by assist.  at bedside. Calm, cooperative, pleasant. Will continue to monitor.

## 2023-06-15 NOTE — PLAN OF CARE
Goal Outcome Evaluation:Being sent home for self care.  Supplies sent also.                       29-May-2017

## 2023-06-15 NOTE — PROGRESS NOTES
" Edgar Ortiz  : 1980  MRN: [id number]  CSN: [id number]        Subjective   Patient feeling well today. Reports a bladder spasm yesterday when she had a bowel movement. She is eager to go home.      Objective   /68 (BP Location: Right arm, Patient Position: Lying)   Pulse 70   Temp 97.4 °F (36.3 °C) (Temporal)   Resp 16   Ht 167.6 cm (66\")   Wt 78 kg (172 lb)   LMP 2019   SpO2 95%   BMI 27.76 kg/m²   General: well developed; well nourished  no acute distress   Heart: Not performed.   Lungs: breathing is unlabored   Abdomen: soft, non-tender; no masses  no umbilical or inguinal hernias are present  no hepato-splenomegaly  incision is clean, dry, intact, and without drainage   Pelvis:: Not performed.   Labs  BMP:   Lab Results   Component Value Date     2023    K 5.1 2023     2023    CO2 27.0 2023    BUN 11 2023    CREATININE 0.58 2023     CBC w/ diff:   Lab Results   Component Value Date    WBC 8.80 2023    NEUTRORELPCT 90.9 (H) 2023    AUTOIGPER 0.3 2023    LYMPHORELPCT 3.3 (L) 2023    MONORELPCT 1.5 (L) 2023    EOSRELPCT 3.8 2023    BASORELPCT 0.2 2023    HGB 11.9 (L) 2023    HCT 36.3 2023    MCV 96.8 2023    RDW 14.5 2023     2023     Urine Culture: No results found for: URINECX          Assessment   1.  POD 2, cystoscopy with bilateral retrograde pyelogram.   2.  Post op day 10, robotic hysterectomy.   3.  Bladder injury with urinary ascites. Abdominal distention resolving, creatinine and WBC have normalized.     Plan:   DC home.  Continue indwelling catheter.    Prophylactic keflex.  Urology follow up 1 week.         Janine Mckay MD  6/15/2023  07:56 EDT     "

## 2023-06-16 ENCOUNTER — READMISSION MANAGEMENT (OUTPATIENT)
Dept: CALL CENTER | Facility: HOSPITAL | Age: 43
End: 2023-06-16
Payer: COMMERCIAL

## 2023-06-16 NOTE — OUTREACH NOTE
Prep Survey      Flowsheet Row Responses   [facility] patient discharged from? [facility]   Is LACE score < 7 ? No   Eligibility Readm Mgmt   Discharge diagnosis abdominal pain,  cystoscopy/pyelogram   Does the patient have one of the following disease processes/diagnoses(primary or secondary)? Other   Does the patient have Home health ordered? No   Is there a DME ordered? No   Prep survey completed? Yes            Merlyn KELSEY - Registered Nurse

## 2023-06-17 LAB
BACTERIA SPEC AEROBE CULT: NORMAL
BACTERIA SPEC AEROBE CULT: NORMAL

## 2023-06-19 ENCOUNTER — TRANSCRIBE ORDERS (OUTPATIENT)
Dept: ADMINISTRATIVE | Facility: HOSPITAL | Age: 43
End: 2023-06-19
Payer: COMMERCIAL

## 2023-06-19 DIAGNOSIS — N32.89 NONTRAUMATIC RUPTURE OF BLADDER: Primary | ICD-10-CM

## 2023-06-22 NOTE — DISCHARGE SUMMARY
TIFFANY Hernández  Post Operative Discharge Summary      Patient: Zofia Ortiz        MR#:[id number]    Admission  Diagnosis: Bladder perforation with urinary ascites  Discharge Diagnosis:  Same    Date of Admission: 6/12/2023  Date of Discharge:  6/15/2023     Procedures:  Cystoscopy with bilateral retrograde pyelograms             Service:  Gynecology    Hospital Course:  Patient was admitted 1 week s/p TRH for suspected bladder perforation.  She underwent cystoscopy with bilateral retrograde pyelograms on HD1.  A anderson catheter was placed and she remained on prophylactic antibiotics for 2 days.  During that time she remained afebrile and hemodynamically stable.  On the day of discharge, her pain was controlled and she was eating and ambulating without difficulty.  She was discharged with indwelling catheter.   Labs:    Lab Results (last 24 hours)     Procedure Component Value Units Date/Time    Basic Metabolic Panel [[id number]]  (Abnormal) Collected: 06/15/23 0835    Specimen: Blood Updated: 06/15/23 0918     Glucose 103 mg/dL      BUN 6 mg/dL      Creatinine 0.41 mg/dL      Sodium 142 mmol/L      Potassium 4.5 mmol/L      Comment: Slight hemolysis detected by analyzer. Results may be affected.        Chloride 110 mmol/L      CO2 25.0 mmol/L      Calcium 8.1 mg/dL      BUN/Creatinine Ratio 14.6     Anion Gap 7.0 mmol/L      eGFR 125.4 mL/min/1.73     Narrative:      GFR Normal >60  Chronic Kidney Disease <60  Kidney Failure <15            Discharge Medications     Discharge Medications      Stop These Medications    acetaminophen 325 MG tablet  Commonly known as: TYLENOL     CALCIUM 600 + D PO     Cranberry 1000 MG capsule     cyclobenzaprine 10 MG tablet  Commonly known as: FLEXERIL     DAILY MULTIVITAMIN PO     FLUoxetine 10 MG capsule  Commonly known as: PROzac     meloxicam 7.5 MG tablet  Commonly known as: MOBIC     methylPREDNISolone 4 MG dose pack  Commonly known as: MEDROL     omeprazole 10 MG  capsule  Commonly known as: prilOSEC     oxyCODONE 5 MG immediate release tablet  Commonly known as: ROXICODONE     polyethylene glycol 17 GM/SCOOP powder  Commonly known as: MIRALAX     potassium chloride 10 MEQ CR tablet     senna 8.6 MG tablet  Commonly known as: SENOKOT     Vyvanse 10 MG capsule  Generic drug: lisdexamfetamine dimesylate            Discharge Disposition:  To Home    Discharge Condition:  Stable    Discharge Diet: regular    Activity at Discharge: pelvic rest    Follow-up Appointments  Dr. Canales 1 week.  Dr. Mckay 1 week.    Janine Mckay MD  06/22/23  07:25 EDT

## 2023-08-01 ENCOUNTER — LAB (OUTPATIENT)
Dept: LAB | Facility: HOSPITAL | Age: 43
End: 2023-08-01
Payer: COMMERCIAL

## 2023-08-01 ENCOUNTER — TRANSCRIBE ORDERS (OUTPATIENT)
Dept: LAB | Facility: HOSPITAL | Age: 43
End: 2023-08-01
Payer: COMMERCIAL

## 2023-08-01 DIAGNOSIS — Z11.3 SCREENING EXAMINATION FOR VENEREAL DISEASE: ICD-10-CM

## 2023-08-01 DIAGNOSIS — Z11.3 SCREENING EXAMINATION FOR VENEREAL DISEASE: Primary | ICD-10-CM

## 2023-08-01 LAB — HCV AB SER DONR QL: NORMAL

## 2023-08-01 PROCEDURE — 87491 CHLMYD TRACH DNA AMP PROBE: CPT

## 2023-08-01 PROCEDURE — 87591 N.GONORRHOEAE DNA AMP PROB: CPT

## 2023-08-01 PROCEDURE — 86780 TREPONEMA PALLIDUM: CPT

## 2023-08-01 PROCEDURE — 87340 HEPATITIS B SURFACE AG IA: CPT

## 2023-08-01 PROCEDURE — 86803 HEPATITIS C AB TEST: CPT

## 2023-08-01 PROCEDURE — 36415 COLL VENOUS BLD VENIPUNCTURE: CPT

## 2023-08-01 PROCEDURE — G0432 EIA HIV-1/HIV-2 SCREEN: HCPCS

## 2023-08-02 LAB
HBV SURFACE AG SERPL QL IA: NORMAL
HIV1+2 AB SER QL: NORMAL

## 2023-08-04 LAB — TREPONEMA PALLIDUM IGG+IGM AB [PRESENCE] IN SERUM OR PLASMA BY IMMUNOASSAY: NON REACTIVE

## 2023-08-07 LAB
C TRACH RRNA SPEC QL NAA+PROBE: NEGATIVE
N GONORRHOEA RRNA SPEC QL NAA+PROBE: NEGATIVE

## 2024-02-16 ENCOUNTER — APPOINTMENT (OUTPATIENT)
Dept: ULTRASOUND IMAGING | Facility: HOSPITAL | Age: 44
End: 2024-02-16
Payer: COMMERCIAL

## 2024-02-16 ENCOUNTER — HOSPITAL ENCOUNTER (EMERGENCY)
Facility: HOSPITAL | Age: 44
Discharge: STILL ADMITTED OR ADMINISTRATIVE | End: 2024-02-16
Attending: STUDENT IN AN ORGANIZED HEALTH CARE EDUCATION/TRAINING PROGRAM
Payer: COMMERCIAL

## 2024-02-16 ENCOUNTER — HOSPITAL ENCOUNTER (OUTPATIENT)
Facility: HOSPITAL | Age: 44
Setting detail: OBSERVATION
Discharge: LEFT BEFORE DISCHARGE | End: 2024-02-17
Attending: PSYCHIATRY & NEUROLOGY | Admitting: PSYCHIATRY & NEUROLOGY
Payer: COMMERCIAL

## 2024-02-16 VITALS
HEART RATE: 67 BPM | BODY MASS INDEX: 24.11 KG/M2 | OXYGEN SATURATION: 98 % | SYSTOLIC BLOOD PRESSURE: 138 MMHG | HEIGHT: 66 IN | RESPIRATION RATE: 18 BRPM | DIASTOLIC BLOOD PRESSURE: 93 MMHG | WEIGHT: 150 LBS | TEMPERATURE: 97.4 F

## 2024-02-16 DIAGNOSIS — K80.20 CALCULUS OF GALLBLADDER WITHOUT CHOLECYSTITIS WITHOUT OBSTRUCTION: ICD-10-CM

## 2024-02-16 DIAGNOSIS — F10.930 ALCOHOL WITHDRAWAL SYNDROME WITHOUT COMPLICATION: Primary | ICD-10-CM

## 2024-02-16 DIAGNOSIS — F19.10 POLYSUBSTANCE ABUSE: ICD-10-CM

## 2024-02-16 PROBLEM — F19.20 POLYSUBSTANCE DEPENDENCE: Status: ACTIVE | Noted: 2024-02-16

## 2024-02-16 LAB
ALBUMIN SERPL-MCNC: 3.9 G/DL (ref 3.5–5.2)
ALBUMIN/GLOB SERPL: 1.4 G/DL
ALP SERPL-CCNC: 85 U/L (ref 39–117)
ALT SERPL W P-5'-P-CCNC: 11 U/L (ref 1–33)
AMPHET+METHAMPHET UR QL: POSITIVE
AMPHETAMINES UR QL: POSITIVE
ANION GAP SERPL CALCULATED.3IONS-SCNC: 11.3 MMOL/L (ref 5–15)
AST SERPL-CCNC: 17 U/L (ref 1–32)
BARBITURATES UR QL SCN: NEGATIVE
BASOPHILS # BLD AUTO: 0.06 10*3/MM3 (ref 0–0.2)
BASOPHILS NFR BLD AUTO: 0.9 % (ref 0–1.5)
BENZODIAZ UR QL SCN: NEGATIVE
BILIRUB SERPL-MCNC: <0.2 MG/DL (ref 0–1.2)
BILIRUB UR QL STRIP: NEGATIVE
BUN SERPL-MCNC: 6 MG/DL (ref 6–20)
BUN/CREAT SERPL: 10.3 (ref 7–25)
BUPRENORPHINE SERPL-MCNC: NEGATIVE NG/ML
CALCIUM SPEC-SCNC: 8.6 MG/DL (ref 8.6–10.5)
CANNABINOIDS SERPL QL: POSITIVE
CHLORIDE SERPL-SCNC: 104 MMOL/L (ref 98–107)
CLARITY UR: CLEAR
CO2 SERPL-SCNC: 26.7 MMOL/L (ref 22–29)
COCAINE UR QL: POSITIVE
COLOR UR: YELLOW
CREAT SERPL-MCNC: 0.58 MG/DL (ref 0.57–1)
DEPRECATED RDW RBC AUTO: 42.6 FL (ref 37–54)
EGFRCR SERPLBLD CKD-EPI 2021: 114.6 ML/MIN/1.73
EOSINOPHIL # BLD AUTO: 0.77 10*3/MM3 (ref 0–0.4)
EOSINOPHIL NFR BLD AUTO: 12.1 % (ref 0.3–6.2)
ERYTHROCYTE [DISTWIDTH] IN BLOOD BY AUTOMATED COUNT: 12 % (ref 12.3–15.4)
ETHANOL BLD-MCNC: 48 MG/DL (ref 0–10)
ETHANOL UR QL: 0.05 %
FENTANYL UR-MCNC: POSITIVE NG/ML
GLOBULIN UR ELPH-MCNC: 2.8 GM/DL
GLUCOSE SERPL-MCNC: 111 MG/DL (ref 65–99)
GLUCOSE UR STRIP-MCNC: NEGATIVE MG/DL
HCT VFR BLD AUTO: 41.5 % (ref 34–46.6)
HGB BLD-MCNC: 13.5 G/DL (ref 12–15.9)
HGB UR QL STRIP.AUTO: NEGATIVE
IMM GRANULOCYTES # BLD AUTO: 0.01 10*3/MM3 (ref 0–0.05)
IMM GRANULOCYTES NFR BLD AUTO: 0.2 % (ref 0–0.5)
KETONES UR QL STRIP: NEGATIVE
LEUKOCYTE ESTERASE UR QL STRIP.AUTO: NEGATIVE
LIPASE SERPL-CCNC: 19 U/L (ref 13–60)
LYMPHOCYTES # BLD AUTO: 2.36 10*3/MM3 (ref 0.7–3.1)
LYMPHOCYTES NFR BLD AUTO: 37.2 % (ref 19.6–45.3)
MAGNESIUM SERPL-MCNC: 1.9 MG/DL (ref 1.6–2.6)
MCH RBC QN AUTO: 31.5 PG (ref 26.6–33)
MCHC RBC AUTO-ENTMCNC: 32.5 G/DL (ref 31.5–35.7)
MCV RBC AUTO: 97 FL (ref 79–97)
METHADONE UR QL SCN: NEGATIVE
MONOCYTES # BLD AUTO: 0.47 10*3/MM3 (ref 0.1–0.9)
MONOCYTES NFR BLD AUTO: 7.4 % (ref 5–12)
NEUTROPHILS NFR BLD AUTO: 2.68 10*3/MM3 (ref 1.7–7)
NEUTROPHILS NFR BLD AUTO: 42.2 % (ref 42.7–76)
NITRITE UR QL STRIP: NEGATIVE
NRBC BLD AUTO-RTO: 0 /100 WBC (ref 0–0.2)
OPIATES UR QL: NEGATIVE
OXYCODONE UR QL SCN: NEGATIVE
PCP UR QL SCN: NEGATIVE
PH UR STRIP.AUTO: 7.5 [PH] (ref 5–8)
PLATELET # BLD AUTO: 279 10*3/MM3 (ref 140–450)
PMV BLD AUTO: 8.8 FL (ref 6–12)
POTASSIUM SERPL-SCNC: 3.5 MMOL/L (ref 3.5–5.2)
PROT SERPL-MCNC: 6.7 G/DL (ref 6–8.5)
PROT UR QL STRIP: NEGATIVE
QT INTERVAL: 426 MS
QTC INTERVAL: 485 MS
RBC # BLD AUTO: 4.28 10*6/MM3 (ref 3.77–5.28)
SODIUM SERPL-SCNC: 142 MMOL/L (ref 136–145)
SP GR UR STRIP: 1.02 (ref 1–1.03)
TRICYCLICS UR QL SCN: NEGATIVE
UROBILINOGEN UR QL STRIP: NORMAL
WBC NRBC COR # BLD AUTO: 6.35 10*3/MM3 (ref 3.4–10.8)

## 2024-02-16 PROCEDURE — 76705 ECHO EXAM OF ABDOMEN: CPT | Performed by: RADIOLOGY

## 2024-02-16 PROCEDURE — 80307 DRUG TEST PRSMV CHEM ANLYZR: CPT | Performed by: STUDENT IN AN ORGANIZED HEALTH CARE EDUCATION/TRAINING PROGRAM

## 2024-02-16 PROCEDURE — 83735 ASSAY OF MAGNESIUM: CPT | Performed by: STUDENT IN AN ORGANIZED HEALTH CARE EDUCATION/TRAINING PROGRAM

## 2024-02-16 PROCEDURE — 99285 EMERGENCY DEPT VISIT HI MDM: CPT

## 2024-02-16 PROCEDURE — 93005 ELECTROCARDIOGRAM TRACING: CPT | Performed by: STUDENT IN AN ORGANIZED HEALTH CARE EDUCATION/TRAINING PROGRAM

## 2024-02-16 PROCEDURE — 85025 COMPLETE CBC W/AUTO DIFF WBC: CPT | Performed by: STUDENT IN AN ORGANIZED HEALTH CARE EDUCATION/TRAINING PROGRAM

## 2024-02-16 PROCEDURE — 93010 ELECTROCARDIOGRAM REPORT: CPT | Performed by: INTERNAL MEDICINE

## 2024-02-16 PROCEDURE — 80053 COMPREHEN METABOLIC PANEL: CPT | Performed by: STUDENT IN AN ORGANIZED HEALTH CARE EDUCATION/TRAINING PROGRAM

## 2024-02-16 PROCEDURE — G0378 HOSPITAL OBSERVATION PER HR: HCPCS

## 2024-02-16 PROCEDURE — 82077 ASSAY SPEC XCP UR&BREATH IA: CPT | Performed by: STUDENT IN AN ORGANIZED HEALTH CARE EDUCATION/TRAINING PROGRAM

## 2024-02-16 PROCEDURE — 36415 COLL VENOUS BLD VENIPUNCTURE: CPT

## 2024-02-16 PROCEDURE — 83690 ASSAY OF LIPASE: CPT | Performed by: STUDENT IN AN ORGANIZED HEALTH CARE EDUCATION/TRAINING PROGRAM

## 2024-02-16 PROCEDURE — 76705 ECHO EXAM OF ABDOMEN: CPT

## 2024-02-16 PROCEDURE — 81003 URINALYSIS AUTO W/O SCOPE: CPT | Performed by: STUDENT IN AN ORGANIZED HEALTH CARE EDUCATION/TRAINING PROGRAM

## 2024-02-16 RX ORDER — BENZTROPINE MESYLATE 1 MG/1
2 TABLET ORAL ONCE AS NEEDED
Status: DISCONTINUED | OUTPATIENT
Start: 2024-02-16 | End: 2024-02-17 | Stop reason: HOSPADM

## 2024-02-16 RX ORDER — MULTIPLE VITAMINS W/ MINERALS TAB 9MG-400MCG
1 TAB ORAL DAILY
Status: DISCONTINUED | OUTPATIENT
Start: 2024-02-16 | End: 2024-02-17 | Stop reason: HOSPADM

## 2024-02-16 RX ORDER — LORAZEPAM 1 MG/1
1 TABLET ORAL
Status: DISCONTINUED | OUTPATIENT
Start: 2024-02-16 | End: 2024-02-16 | Stop reason: HOSPADM

## 2024-02-16 RX ORDER — BENZTROPINE MESYLATE 1 MG/ML
1 INJECTION INTRAMUSCULAR; INTRAVENOUS ONCE AS NEEDED
Status: DISCONTINUED | OUTPATIENT
Start: 2024-02-16 | End: 2024-02-17 | Stop reason: HOSPADM

## 2024-02-16 RX ORDER — DIAZEPAM 5 MG/ML
5 INJECTION, SOLUTION INTRAMUSCULAR; INTRAVENOUS EVERY 4 HOURS PRN
Status: DISCONTINUED | OUTPATIENT
Start: 2024-02-16 | End: 2024-02-16 | Stop reason: HOSPADM

## 2024-02-16 RX ORDER — PANTOPRAZOLE SODIUM 40 MG/1
40 TABLET, DELAYED RELEASE ORAL
Status: CANCELLED | OUTPATIENT
Start: 2024-02-17

## 2024-02-16 RX ORDER — MULTIPLE VITAMINS W/ MINERALS TAB 9MG-400MCG
1 TAB ORAL DAILY
Status: DISCONTINUED | OUTPATIENT
Start: 2024-02-16 | End: 2024-02-16 | Stop reason: HOSPADM

## 2024-02-16 RX ORDER — MULTIVITAMIN WITH IRON
2 TABLET ORAL DAILY
Status: DISCONTINUED | OUTPATIENT
Start: 2024-02-16 | End: 2024-02-17 | Stop reason: HOSPADM

## 2024-02-16 RX ORDER — LOPERAMIDE HYDROCHLORIDE 2 MG/1
2 CAPSULE ORAL
Status: DISCONTINUED | OUTPATIENT
Start: 2024-02-16 | End: 2024-02-17 | Stop reason: HOSPADM

## 2024-02-16 RX ORDER — HYDROXYZINE 50 MG/1
50 TABLET, FILM COATED ORAL EVERY 6 HOURS PRN
Status: DISCONTINUED | OUTPATIENT
Start: 2024-02-16 | End: 2024-02-17 | Stop reason: HOSPADM

## 2024-02-16 RX ORDER — IBUPROFEN 400 MG/1
400 TABLET ORAL EVERY 6 HOURS PRN
Status: DISCONTINUED | OUTPATIENT
Start: 2024-02-16 | End: 2024-02-17 | Stop reason: HOSPADM

## 2024-02-16 RX ORDER — DIAZEPAM 5 MG/1
5 TABLET ORAL EVERY 6 HOURS PRN
Status: DISCONTINUED | OUTPATIENT
Start: 2024-02-16 | End: 2024-02-16 | Stop reason: HOSPADM

## 2024-02-16 RX ORDER — NICOTINE 21 MG/24HR
1 PATCH, TRANSDERMAL 24 HOURS TRANSDERMAL
Status: DISCONTINUED | OUTPATIENT
Start: 2024-02-16 | End: 2024-02-17 | Stop reason: HOSPADM

## 2024-02-16 RX ORDER — TRAZODONE HYDROCHLORIDE 50 MG/1
50 TABLET ORAL NIGHTLY PRN
Status: DISCONTINUED | OUTPATIENT
Start: 2024-02-16 | End: 2024-02-17 | Stop reason: HOSPADM

## 2024-02-16 RX ORDER — ALUMINA, MAGNESIA, AND SIMETHICONE 2400; 2400; 240 MG/30ML; MG/30ML; MG/30ML
15 SUSPENSION ORAL EVERY 6 HOURS PRN
Status: DISCONTINUED | OUTPATIENT
Start: 2024-02-16 | End: 2024-02-17 | Stop reason: HOSPADM

## 2024-02-16 RX ORDER — LORAZEPAM 1 MG/1
1 TABLET ORAL EVERY 6 HOURS
Status: DISCONTINUED | OUTPATIENT
Start: 2024-02-17 | End: 2024-02-16 | Stop reason: HOSPADM

## 2024-02-16 RX ORDER — FOLIC ACID 1 MG/1
1 TABLET ORAL DAILY
Status: DISCONTINUED | OUTPATIENT
Start: 2024-02-16 | End: 2024-02-16 | Stop reason: HOSPADM

## 2024-02-16 RX ORDER — ECHINACEA PURPUREA EXTRACT 125 MG
2 TABLET ORAL AS NEEDED
Status: DISCONTINUED | OUTPATIENT
Start: 2024-02-16 | End: 2024-02-17 | Stop reason: HOSPADM

## 2024-02-16 RX ORDER — ONDANSETRON 4 MG/1
4 TABLET, ORALLY DISINTEGRATING ORAL EVERY 6 HOURS PRN
Status: DISCONTINUED | OUTPATIENT
Start: 2024-02-16 | End: 2024-02-17 | Stop reason: HOSPADM

## 2024-02-16 RX ORDER — LORAZEPAM 2 MG/1
2 TABLET ORAL EVERY 6 HOURS
Status: DISCONTINUED | OUTPATIENT
Start: 2024-02-16 | End: 2024-02-16 | Stop reason: HOSPADM

## 2024-02-16 RX ORDER — PANTOPRAZOLE SODIUM 40 MG/1
40 TABLET, DELAYED RELEASE ORAL
Status: DISCONTINUED | OUTPATIENT
Start: 2024-02-17 | End: 2024-02-17 | Stop reason: HOSPADM

## 2024-02-16 RX ORDER — BENZONATATE 100 MG/1
100 CAPSULE ORAL 3 TIMES DAILY PRN
Status: DISCONTINUED | OUTPATIENT
Start: 2024-02-16 | End: 2024-02-17 | Stop reason: HOSPADM

## 2024-02-16 RX ORDER — OMEPRAZOLE 20 MG/1
20 CAPSULE, DELAYED RELEASE ORAL DAILY
COMMUNITY
End: 2024-02-17 | Stop reason: HOSPADM

## 2024-02-16 RX ORDER — ACETAMINOPHEN 325 MG/1
650 TABLET ORAL EVERY 6 HOURS PRN
Status: DISCONTINUED | OUTPATIENT
Start: 2024-02-16 | End: 2024-02-17 | Stop reason: HOSPADM

## 2024-02-16 RX ORDER — LORAZEPAM 2 MG/1
2 TABLET ORAL
Status: DISCONTINUED | OUTPATIENT
Start: 2024-02-16 | End: 2024-02-16 | Stop reason: HOSPADM

## 2024-02-16 RX ORDER — FAMOTIDINE 20 MG/1
20 TABLET, FILM COATED ORAL 2 TIMES DAILY PRN
Status: DISCONTINUED | OUTPATIENT
Start: 2024-02-16 | End: 2024-02-17 | Stop reason: HOSPADM

## 2024-02-16 RX ADMIN — Medication 2 TABLET: at 18:12

## 2024-02-16 RX ADMIN — LORAZEPAM 2 MG: 2 TABLET ORAL at 05:25

## 2024-02-16 RX ADMIN — IBUPROFEN 400 MG: 400 TABLET, FILM COATED ORAL at 18:12

## 2024-02-16 RX ADMIN — HYDROXYZINE HYDROCHLORIDE 50 MG: 50 TABLET ORAL at 18:12

## 2024-02-16 RX ADMIN — Medication 1 TABLET: at 18:13

## 2024-02-16 RX ADMIN — Medication 100 MG: at 18:12

## 2024-02-16 NOTE — NURSING NOTE
"Pt presents to intake requesting to detox from alcohol, amphetamines, heroin and cocaine. Pt states that she drinks about a pint to a fifth daily, states she uses about a bowl of meth a day and that she thinks she is using other amphetamines as well. She states she uses cocaine and heroin as well but is not able to say how much. Pt states she has been using everything since she was a teenager except the heroin, which she started two years ago. She states that the uses by smoking, snorting, and IV.   Pt rates anxiety 7, depression 5, cravings unable to rate.   Pt falling asleep throughout assessment, unable to answer most questions related to personal life r/t mental status currently. Pt states that she last used meth, heroin, and cocaine approximately an hour before she arrived to the hospital. Pt easily arouses, however falls asleep soon after. Pt is oriented to person, place, time and situation.     COWS 10, CIWA 5    Pt resides in [place] with her  and two kids. She states that she drove herself to the hospital today. Pt states that she has no periods of sobriety and that she has never been in treatment before.     Pt reported abdominal pain to the ER, voices no c/o at this time. Pt's gallbladder ultrasound resulted \"possible cholecystitis\" however Dr. Brady stated that she does not think the pt has it.   "

## 2024-02-16 NOTE — NURSING NOTE
Pt continues resting in lounger in room 5, vitals stable. No s/s distress noted, respirations even and unlabored.

## 2024-02-16 NOTE — NURSING NOTE
Pt continues resting in lounger in room 5 with eyes closed. Respirations even and unlabored, no s/s of distress noted.

## 2024-02-16 NOTE — ED NOTES
Nutrition Progress Note  (Physician Action Needed)    Physician- please edit note, check the appropriate diagnosis from list below and indicate whether you agree with the plan of care    The registered dietitian has identified that this patient meets criteria for malnutrition.    Nutrition Care Plan    Nutrition Diagnosis:   Moderate Protein Calorie Malnutrition related to poor appetite, weakness, unable to care for self at home, hx of dementia as evidenced by unresolved MPCM, further wt loss of 2.8% (2 kg) in ~ 1 month, pt consuming less than 75% of EEE for the past ~ week of more, recently eating 25% on average for the past 5 meals    Intervention:  General/healthful diet:   Rec continue with current regular diet, encourage intakes, refusing meals at times  Commercial beverage:   Pt started on standard ONS bid with meals, encourage intakes, pt will be d/c to NH/rehab, rec continue supplements after d/c  Other:  Estimated needs:  2808-5541 kcals/day, 75-88 grams protein/day      Physician Documentation  Based on above, patient meets criteria for:    []  Severe Protein Calorie Malnutrition  [x]  Moderate Protein Calorie Malnutrition  []  Mild Protein Calorie Malnutrition  [] Unable to determine   [] Other:     * Please add the appropriate diagnosis to the patient's Problem List and subsequent Progress Notes.     Plan of care:   [x]  Agree with nutrition assessment and plan of care as stated above.    [] Agree with nutrition assessment and plan of care, with exception. Patient meets criteria for diagnosis except ________.    [] Disagree with nutrition assessment and plan of care because_________.         Pt medically cleared for intake per Dr. Brady. Denisse, intake RN at bedside for evaluation at this time.

## 2024-02-16 NOTE — NURSING NOTE
Spoke to Dr. Yao via phone. Discussed pt assessment, labs, and vitals. Advised that pt can be admitted to CD, observation status, routine orders, SP4. RBTOX2     ER Provider aware

## 2024-02-16 NOTE — PLAN OF CARE
Goal Outcome Evaluation:  Plan of Care Reviewed With: patient  Patient Agreement with Plan of Care: agrees     Progress: no change  Outcome Evaluation: Patient states she came in to deox off of alcohol, cocaine, fentanyl, amp, and meth. Patient states that she has been doing amphetamines for the last 15 years, starting with different types of diet pills and adderall. She states about a year and a half ago, she began using meth. She states that she probably uses 1-2 bowls per day, usually smoking but sometimes by snorting. Patient states she has also been using about 2g of cocaine per day via smoking and snorting. She states that she uses a small amount of heroin a few times a week and that she drinks a pint to a fifth per day of liquor. Pt denies SI/HI/AVH, rates anxiety 8, depression 7, cravings 10. COWS 4, CIWA 5. Sleep-poor. Appetite-good.

## 2024-02-16 NOTE — NURSING NOTE
Spoke to Dr. Yao via phone who states that since pt was restless and an accurate assessment was not able to be completed, to allow the pt to rest in intake and then recomplete assessment once the pt wakes up. RBTOX2  Lead RN aware

## 2024-02-16 NOTE — NURSING NOTE
Pt now awake and alert. Explained to pt that the MD wanted to wait until we were able to complete a more detailed assessment, verbalized understanding.     Pt states that she has been doing amphetamines for the last 15 years, starting with different types of diet pills and adderall. She states about a year and a half ago, she began using meth. She states that she probably uses 1-2 bowls per day, usually smoking but sometimes by snorting. Pt states she has also been using about 2g of cocaine per day via smoking and snorting. She states that she uses a small amount of heroin a few times a week and that she drinks a pint to a fifth per day of liquor. Pt denies SI/HI/AVH, rates anxiety 7, depression 6, cravings 0. COWS 8, CIWA 7

## 2024-02-16 NOTE — ED PROVIDER NOTES
Subjective   History of Present Illness  44-year-old female past medical history of hypertension, alcoholism, polysubstance abuse presents to the ED for alcohol detox and detox from heroin, methamphetamine, cocaine.  Last drink about an hour and a half ago and patient states she drank a pint tonight.  Patient also states she feels like she has a UTI.  Has also been having some right upper quadrant abdominal pain.  Also having generalized back pain.  Patient still has her gallbladder.  No fevers or chills.  Patient has never done detox before but states she feels like she needs it.    History provided by:  Patient      Review of Systems    Past Medical History:   Diagnosis Date    Abnormal glucose     Alcohol abuse     Body piercing     EARS ONLY    Dyspepsia     rare heartburn sx's, serum h. pyl neg    Dyspnea on exertion     Elevated BP     156/106 @ Intake, denies hx HTN.      Elevated transaminase level     Fatigue     Former smoker     Vapes now    GERD (gastroesophageal reflux disease)     Morbid obesity      (normal spontaneous vaginal delivery)     x 1 w/o complic    Sleep apnea     newly dx, but suspects she has always had it    Sleep apnea, obstructive 2018    recent sleep stuady change to bipap    Tattoos     NECK, LEFT THIGH, RIGHT ANKLE    Vitamin D deficiency        No Known Allergies    Past Surgical History:   Procedure Laterality Date    ABDOMINOPLASTY N/A 2019    Procedure: ABDOMINOPLASTY;  Surgeon: Abisai Thurman MD;  Location: [facility] OR;  Service: Plastics     SECTION WITH TUBAL  2007    CYSTECTOMY      ORAL    CYSTOSCOPY RETROGRADE PYELOGRAM Bilateral 2023    Procedure: CYSTOSCOPY, BILATERAL RETROGRADE PYELOGRAM;  Surgeon: Antonio Canales MD;  Location:  [facility];  Service: Urology;  Laterality: Bilateral;    ENDOSCOPY N/A 2018    Procedure: ESOPHAGOGASTRODUODENOSCOPY;  Surgeon: Glen Blackmon MD;  Location:  [facility];  Service: Bariatric    GASTRIC  SLEEVE LAPAROSCOPIC N/A 2018    Procedure: GASTRIC SLEEVE LAPAROSCOPIC;  Surgeon: Glen Blackmon MD;  Location:  [facility];  Service: Bariatric    KNEE ARTHROSCOPY Right 2013    MASTOPEXY Bilateral 2019    Procedure: BREAST LIFT BILATERAL;  Surgeon: Abisai Thurman MD;  Location:  [facility];  Service: Plastics    TOTAL LAPAROSCOPIC HYSTERECTOMY SALPINGECTOMY N/A 2023    Procedure: TOTAL LAPAROSCOPIC HYSTERECTOMY BILATERAL SALPINGECTOMY WITH DAVINCI ROBOT;  Surgeon: Janine Mckay MD;  Location:  [facility];  Service: Robotics - DaVinci;  Laterality: N/A;    WISDOM TOOTH EXTRACTION      ALL FOUR       Family History   Problem Relation Age of Onset    Hypertension Mother     No Known Problems Father     No Known Problems Sister     Obesity Maternal Grandmother     Hypertension Maternal Grandmother     Diabetes Maternal Grandmother     Melanoma Maternal Grandfather        Social History     Socioeconomic History    Marital status:      Spouse name: Bruce Ortiz    Number of children: 2    Years of education: Bachelor's   Tobacco Use    Smoking status: Former     Packs/day: 0.50     Years: 8.00     Additional pack years: 0.00     Total pack years: 4.00     Types: Cigarettes     Quit date:      Years since quittin.1    Smokeless tobacco: Never   Vaping Use    Vaping Use: Never used   Substance and Sexual Activity    Alcohol use: Not Currently     Comment: Occasional    Drug use: Yes     Types: Marijuana, Cocaine(coke), Heroin, Amphetamines, Methamphetamines    Sexual activity: Defer     Partners: Male     Comment:             Objective   Physical Exam  Constitutional:       General: She is not in acute distress.     Appearance: She is not ill-appearing or toxic-appearing.   HENT:      Head: Normocephalic and atraumatic.   Eyes:      Conjunctiva/sclera: Conjunctivae normal.   Cardiovascular:      Rate and Rhythm: Normal rate and regular rhythm.   Pulmonary:      Effort:  Pulmonary effort is normal.   Abdominal:      General: Abdomen is flat.      Palpations: Abdomen is soft.      Tenderness: There is abdominal tenderness in the right upper quadrant. There is no guarding.   Neurological:      General: No focal deficit present.      Mental Status: She is alert and oriented to person, place, and time. Mental status is at baseline.         Procedures           ED Course  ED Course as of 02/16/24 1552   Fri Feb 16, 2024   0526 EKG obtained and independently interpreted as normal sinus rhythm, QTc read as 485 but to me does not look prolonged, no acute ischemic findings  Electronically signed by Melquiades Huber MD, 02/16/24, 5:27 AM EST.   [AS]   0732 Spoke with Dr. Rogel regarding patient case putting ultrasound room for possible cholecystitis given mildly thickened gallbladder of 4 mm but no pericholecystic fluid.  Normal labs, no LFT abnormalities or elevated white blood cell count.  On my exam the patient's abdomen she is not particularly tender.  I have low suspicion for acute cholecystitis.  Patient does have gallstone on ultrasound.  Dr. Rogel agrees unlikely acute cholecystitis can follow-up as outpatient as needed for gallstones. [KH]   1552 Patient admitted to detox [KH]      ED Course User Index  [AS] Melquiades Huber MD  [KH] Darya Brady MD                                             Medical Decision Making  44-year-old female presents to the ED for alcohol and drug detox and also has some right upper quadrant abdominal pain and possible UTI.    Problems Addressed:  Alcohol withdrawal syndrome without complication: complicated acute illness or injury  Calculus of gallbladder without cholecystitis without obstruction: complicated acute illness or injury  Polysubstance abuse: complicated acute illness or injury    Amount and/or Complexity of Data Reviewed  Labs: ordered.  Radiology: ordered.  ECG/medicine tests: ordered.    Risk  OTC drugs.  Prescription drug  management.        Final diagnoses:   Alcohol withdrawal syndrome without complication   Polysubstance abuse   Calculus of gallbladder without cholecystitis without obstruction       ED Disposition  ED Disposition       ED Disposition   DC/Transfer to Behavioral Health Condition   Stable    Comment   --               No follow-up provider specified.       Medication List      No changes were made to your prescriptions during this visit.            Darya Brady MD  02/16/24 2509

## 2024-02-16 NOTE — ED NOTES
Report received from MALINDA Stewart. Pt resting on stretcher with eyes closed appearing to be asleep. Pt RR even and unlabored, skin WPD. Pt eaisly arouses to verbal stimuli. Pt vitals stable. Pt pending provider re-evaluation. WCTM. Safety measures remain WDL.

## 2024-02-17 VITALS
RESPIRATION RATE: 18 BRPM | DIASTOLIC BLOOD PRESSURE: 92 MMHG | HEART RATE: 94 BPM | WEIGHT: 158.2 LBS | SYSTOLIC BLOOD PRESSURE: 134 MMHG | TEMPERATURE: 98.4 F | BODY MASS INDEX: 25.43 KG/M2 | HEIGHT: 66 IN | OXYGEN SATURATION: 96 %

## 2024-02-17 PROCEDURE — G0378 HOSPITAL OBSERVATION PER HR: HCPCS

## 2024-02-17 PROCEDURE — 99223 1ST HOSP IP/OBS HIGH 75: CPT | Performed by: PSYCHIATRY & NEUROLOGY

## 2024-02-17 RX ADMIN — Medication 2 TABLET: at 08:08

## 2024-02-17 RX ADMIN — IBUPROFEN 400 MG: 400 TABLET, FILM COATED ORAL at 00:45

## 2024-02-17 RX ADMIN — MAGNESIUM HYDROXIDE 10 ML: 2400 SUSPENSION ORAL at 08:08

## 2024-02-17 RX ADMIN — Medication 100 MG: at 08:08

## 2024-02-17 RX ADMIN — HYDROXYZINE HYDROCHLORIDE 50 MG: 50 TABLET ORAL at 13:14

## 2024-02-17 RX ADMIN — HYDROXYZINE HYDROCHLORIDE 50 MG: 50 TABLET ORAL at 00:45

## 2024-02-17 RX ADMIN — Medication 1 TABLET: at 08:08

## 2024-02-17 RX ADMIN — TRAZODONE HYDROCHLORIDE 50 MG: 50 TABLET ORAL at 00:45

## 2024-02-17 RX ADMIN — PANTOPRAZOLE SODIUM 40 MG: 40 TABLET, DELAYED RELEASE ORAL at 08:08

## 2024-02-17 NOTE — H&P
INITIAL PSYCHIATRIC HISTORY & PHYSICAL    Patient Identification:  Name:  Zofia Ortiz  Age:  44 y.o.  Sex:  female  :  1980  MRN:  [id number]   Visit Number:  [id number]  Primary Care Physician:  Morgan Arguello MD    SUBJECTIVE    CC/Focus of Exam: Alcohol dependence, polysubstance abuse    HPI: Zofia Ortiz is a 44 y.o. female who was admitted on 2024 with complaints of alcohol dependence, polysubstance abuse, including regular use of fentanyl, cocaine, methamphetamine.  Patient has been using drugs and alcohol for roughly 15 years, starting with diet pills and Adderall.  She moved to methamphetamine use 2 years ago, as well as cocaine and occasional heroin.  She reports drinking 1/5 of liquor daily.  She reports attempts to stop on her own, personal and professional distress related to substance abuse, tolerance, dependence, withdrawal.    PAST PSYCHIATRIC HX:  Dx: Denied  IP: Denied  OP: Denied  Current meds: Denied  Previous meds: Previous antidepressant that she cannot recall  SH/SI/SA: Denied x 3  Trauma: Denied    SUBSTANCE USE HX:  Per HPI  Admission UDS + meth/amp/fentanyl/cocaine/THC  Ethanol 48 on arrival to the ED    SOCIAL HX:  Lives in [place]  Works as a   Denies legal issues    FAMILY HX:    Family History   Problem Relation Age of Onset    Hypertension Mother     No Known Problems Father     No Known Problems Sister     Obesity Maternal Grandmother     Hypertension Maternal Grandmother     Diabetes Maternal Grandmother     Melanoma Maternal Grandfather        Past Medical History:   Diagnosis Date    Abnormal glucose     Alcohol abuse     Body piercing     EARS ONLY    Dyspepsia     rare heartburn sx's, serum h. pyl neg    Dyspnea on exertion     Elevated BP     156/106 @ Intake, denies hx HTN.      Elevated transaminase level     Fatigue     Former smoker     Vapes now    GERD (gastroesophageal reflux disease)     Morbid obesity      (normal  spontaneous vaginal delivery)     x 1 w/o complic    Sleep apnea     newly dx, but suspects she has always had it    Sleep apnea, obstructive 2018    recent sleep stuady change to bipap    Tattoos     NECK, LEFT THIGH, RIGHT ANKLE    Vitamin D deficiency        Past Surgical History:   Procedure Laterality Date    ABDOMINOPLASTY N/A 2019    Procedure: ABDOMINOPLASTY;  Surgeon: Abisai Thurman MD;  Location:  [facility];  Service: Plastics     SECTION WITH TUBAL  2007    CYSTECTOMY      ORAL    CYSTOSCOPY RETROGRADE PYELOGRAM Bilateral 2023    Procedure: CYSTOSCOPY, BILATERAL RETROGRADE PYELOGRAM;  Surgeon: Antonio Canales MD;  Location:  [facility];  Service: Urology;  Laterality: Bilateral;    ENDOSCOPY N/A 2018    Procedure: ESOPHAGOGASTRODUODENOSCOPY;  Surgeon: Glen Blackmon MD;  Location:  [facility];  Service: Bariatric    GASTRIC SLEEVE LAPAROSCOPIC N/A 2018    Procedure: GASTRIC SLEEVE LAPAROSCOPIC;  Surgeon: Glen Blackmon MD;  Location:  [facility];  Service: Bariatric    KNEE ARTHROSCOPY Right 2013    MASTOPEXY Bilateral 2019    Procedure: BREAST LIFT BILATERAL;  Surgeon: Abisai Thurman MD;  Location:  [facility];  Service: Plastics    TOTAL LAPAROSCOPIC HYSTERECTOMY SALPINGECTOMY N/A 2023    Procedure: TOTAL LAPAROSCOPIC HYSTERECTOMY BILATERAL SALPINGECTOMY WITH DAVINCI ROBOT;  Surgeon: Janine Mckay MD;  Location:  [facility];  Service: Robotics - DaVinci;  Laterality: N/A;    WISDOM TOOTH EXTRACTION      ALL FOUR       Medications Prior to Admission   Medication Sig Dispense Refill Last Dose    omeprazole (priLOSEC) 20 MG capsule Take 1 capsule by mouth Daily.   Past Week         ALLERGIES:  Patient has no known allergies.    Temp:  [97.4 °F (36.3 °C)-98.6 °F (37 °C)] 98.4 °F (36.9 °C)  Heart Rate:  [67-94] 80  Resp:  [16-20] 16  BP: (123-159)/() 123/71    REVIEW OF SYSTEMS:  Review of Systems   Constitutional:  Positive for chills.    Gastrointestinal:  Positive for nausea.   Musculoskeletal:  Positive for myalgias.   Neurological:  Positive for tremors.   Psychiatric/Behavioral:  Positive for dysphoric mood and sleep disturbance. The patient is nervous/anxious.    All other systems reviewed and are negative.       OBJECTIVE    PHYSICAL EXAM:  Physical Exam  Vitals and nursing note reviewed.   Constitutional:       Appearance: She is well-developed.   HENT:      Head: Normocephalic and atraumatic.      Right Ear: External ear normal.      Left Ear: External ear normal.      Nose: Nose normal.   Eyes:      Pupils: Pupils are equal, round, and reactive to light.   Pulmonary:      Effort: Pulmonary effort is normal. No respiratory distress.      Breath sounds: Normal breath sounds.   Abdominal:      General: There is no distension.      Palpations: Abdomen is soft.   Musculoskeletal:         General: No deformity. Normal range of motion.      Cervical back: Normal range of motion and neck supple.   Skin:     General: Skin is warm.      Findings: No rash.   Neurological:      Mental Status: She is alert and oriented to person, place, and time.      Coordination: Coordination normal.         MENTAL STATUS EXAM:   Hygiene:   fair  Cooperation:  Guarded  Eye Contact:  Fair  Psychomotor Behavior:  Restless  Affect:  Restricted  Hopelessness: 4  Speech:  Minimal  Thought Process: Linear  Thought Content:  Normal  Suicidal:  None  Homicidal:  None  Hallucinations:  None  Delusion:  None  Memory:  Intact  Orientation:  Person, Place, and Situation  Reliability:  poor  Insight:  Fair  Judgment:  Poor  Impulse Control:  Poor      Imaging Results (Last 24 Hours)       ** No results found for the last 24 hours. **             Lab Results   Component Value Date    GLUCOSE 111 (H) 02/16/2024    BUN 6 02/16/2024    CREATININE 0.58 02/16/2024    EGFRIFNONA 86 09/29/2021    EGFRIFAFRI 98 04/11/2019    BCR 10.3 02/16/2024    CO2 26.7 02/16/2024    CALCIUM 8.6  02/16/2024    PROTENTOTREF 6.9 04/11/2019    ALBUMIN 3.9 02/16/2024    LABIL2 2.1 04/11/2019    AST 17 02/16/2024    ALT 11 02/16/2024       Lab Results   Component Value Date    WBC 6.35 02/16/2024    HGB 13.5 02/16/2024    HCT 41.5 02/16/2024    MCV 97.0 02/16/2024     02/16/2024       ECG/EMG Results (most recent)       None             Brief Urine Lab Results  (Last result in the past 365 days)        Color   Clarity   Blood   Leuk Est   Nitrite   Protein   CREAT   Urine HCG        02/16/24 0540 Yellow   Clear   Negative   Negative   Negative   Negative                   Last Urine Toxicity  More data exists         Latest Ref Rng & Units 2/16/2024 6/12/2023   LAST URINE TOXICITY RESULTS   Creatinine, Urine mg/dL - 49.0    Amphetamine, Urine Qual Negative Positive  -   Barbiturates Screen, Urine Negative Negative  -   Benzodiazepine Screen, Urine Negative Negative  -   Buprenorphine, Screen, Urine Negative Negative  -   Cocaine Screen, Urine Negative Positive  -   Fentanyl, Urine Negative Positive  -   Methadone Screen , Urine Negative Negative  -   Methamphetamine, Ur Negative Positive  -       Chart, notes, vitals, labs personally reviewed.  Glucose 111  Outside LUCIO report requested, reviewed, 5 prescriptions of oxycodone in summer 2023  UDS results: + Meth/amp/cocaine/fentanyl/THC  Ethanol 48 on arrival to the ED  EKG tracing personally reviewed, interpreted as normal sinus rhythm, QTc interval 485  Consulted with patient's therapist regarding clinical history and treatment plan    ASSESSMENT & PLAN:    Alcohol use disorder, severe, dependence, in withdrawal  -Admitted to observation as initial CIWA 5  -Initial ethanol 48 on arrival to the ED  -We will encourage rehab or other intensive substance abuse treatment following discharge    Opioid use disorder, severe, dependence, in withdrawal  -Admitted to observation as an initial COWS 4  -Admission UDS + fentanyl  -Placed on clonidine detox  protocol  -We will refer for substance abuse treatment following hospitalization    Methamphetamine use disorder, severe, dependence  -Admission UDS positive  -Supportive care  -We will refer for substance abuse treatment following hospitalization    Cocaine use disorder, severe, dependence  -Admission UDS positive  -Supportive care  -We will refer for substance abuse treatment following hospitalization    Cannabis use disorder, severe, dependence  -Admission UDS positive  -Supportive care  -Ascertain substance abuse treatment plans following discharge    Cholelithiasis  -Per liver ultrasound  -Acute pain is improved.  Will refer to outpatient surgery  -Analgesics as needed    The patient has been admitted to detox observation for safety and stabilization.  Patient will be monitored for suicidality daily and maintained on Special Precautions Level 4 (q30 min checks).  The patient will have individual and group therapy with a master's level therapist. A master treatment plan will be developed and agreed upon by the patient and his/her treatment team.  The patient's estimated length of stay in the hospital is 1-2 days.

## 2024-02-17 NOTE — PLAN OF CARE
Goal Outcome Evaluation:        Consent Given to Review Plan with: None given on this date.            Problem: Adult Behavioral Health Plan of Care  Goal: Plan of Care Review  Outcome: Ongoing, Progressing  Flowsheets  Taken 2/17/2024 1219 by Janine Leon  Consent Given to Review Plan with: None given on this date.  Taken 2/17/2024 0547 by Otto Solano RN  Plan of Care Reviewed With: patient  Patient Agreement with Plan of Care: agrees  Outcome Evaluation:   Patient reports appetite good and sleep fair. Patient rates anxiet 8/10 and depression 6/10 CIWA 5, COWS 7. Patient denies SI, HI, and AVH this shift. Patient rates pain 7/10 body aches   medication discussed and given. Patient rates cravings 8/10 with withdrawal s/s legs cramping, chills, and stuffy head and nose. Patient education   supportive relationships. Patient slept 6 hours at this point in this shift.  Taken 2/16/2024 1727 by Jai Ritter, RN  Progress: no change  Goal: Patient-Specific Goal (Individualization)  Outcome: Ongoing, Progressing  Flowsheets  Taken 2/17/2024 1219 by Janine Leon  Patient-Specific Goals (Include Timeframe): Identify 2-3 coping skills, address relapse prevention methods, complete aftercare plan, complete safety plan, deny SI/HI/AVH prior to discharge.  Individualized Care Needs: Therapist to offer 1-4 therapy sessions, aftercare planning, safety planning, family education, daily groups, and brief CBT/MI interventions.  Taken 2/17/2024 1211 by Janine Leon  Patient Personal Strengths: motivated for treatment  Patient Vulnerabilities: family/relationship conflict  Taken 2/16/2024 1648 by Jai Ritter, RN  Anxieties, Fears or Concerns: none voiced  Goal: Adheres to Safety Considerations for Self and Others  Outcome: Ongoing, Progressing  Flowsheets (Taken 2/17/2024 1219)  Adheres to Safety Considerations for Self and Others: making progress toward outcome  Intervention: Develop and Maintain  Individualized Safety Plan  Flowsheets (Taken 2/17/2024 1000 by Yehuda East, RN)  Safety Measures:   safety rounds completed   environmental rounds completed  Goal: Absence of New-Onset Illness or Injury  Outcome: Ongoing, Progressing  Goal: Optimized Coping Skills in Response to Life Stressors  Outcome: Ongoing, Progressing  Flowsheets (Taken 2/17/2024 1219)  Optimized Coping Skills in Response to Life Stressors: making progress toward outcome  Intervention: Promote Effective Coping Strategies  Flowsheets (Taken 2/17/2024 1219)  Supportive Measures:   active listening utilized   journaling promoted   self-care encouraged   self-reflection promoted   positive reinforcement provided   counseling provided   decision-making supported   problem-solving facilitated   self-responsibility promoted   verbalization of feelings encouraged   relaxation techniques promoted   goal-setting facilitated   guided imagery facilitated  Goal: Develops/Participates in Therapeutic Bellamy to Support Successful Transition  Outcome: Ongoing, Progressing  Flowsheets (Taken 2/17/2024 1219)  Develops/Participates in Therapeutic Bellamy to Support Successful Transition: making progress toward outcome  Intervention: Foster Therapeutic Bellamy  Flowsheets (Taken 2/16/2024 1648 by Jai Ritter, RN)  Trust Relationship/Rapport:   care explained   choices provided   emotional support provided   empathic listening provided   questions answered   questions encouraged   reassurance provided   thoughts/feelings acknowledged  Intervention: Mutually Develop Transition Plan  Flowsheets  Taken 2/17/2024 1219 by Janine Leon  Outpatient/Agency/Support Group Needs: outpatient substance abuse treatment (specify)  Discharge Coordination/Progress: Patient has [facility]. Patient will have own transportation at discharge.  Transition Support:   community resources reviewed   follow-up care coordinated   crisis management plan promoted    follow-up care discussed   crisis management plan verbalized  Anticipated Discharge Disposition: home with family  Concerns to be Addressed:   mental health   substance/tobacco abuse/use  Readmission Within the Last 30 Days: no previous admission in last 30 days  Patient's Choice of Community Agency(s): Patient denied services on this date  Taken 2/16/2024 1709 by Jai Ritter, RN  Transportation Anticipated: car, drives self  Patient/Family Anticipated Services at Transition:      outpatient care   rehabilitation services   mental health services  Patient/Family Anticipates Transition to: home with family  Taken 2/16/2024 1648 by Jai Ritter, RN  Transportation Concerns: none        DATA:  Therapist met individually with Patient this date for initial evaluation.  Introduced self as Therapist and the role of a positive therapeutic relationship; Patient agreeable.  Therapist encouraged Patient to speak openly and honestly about any issues or stressors during treatment stay. Therapist explained how open communication is significant to providing most effective care.      Therapist completed psychosocial assessment, integrated summary, reviewed care plans, disposition planning and discussed hospitalization expectations and treatment goals this date.     Patient did not sign consent for any family or aftercare on this date.     CLINICAL MANUVERING/INTERVENTIONS:  Assisted Patient in processing session content; acknowledged and normalized Patient’s thoughts, feelings, and concerns by utilizing a person-centered approach in efforts to build appropriate rapport and a positive therapeutic relationship with open and honest communication. Allowed Patient to ventilate regarding current stressors and triggers for negative emotions and thoughts in a safe nonjudgmental environment with unconditional positive regard, active listening skills, and empathy. Therapist implemented motivational interviewing  "techniques to assist Patient with exploring personal growth and change and discussed distress tolerance skills, self soothing techniques, and applied cognitive behavioral strategies to facilitate identification of maladaptive patterns of thinking and behavior.Therapist utilized dialectical behavior techniques to teach and model emotional regulation and relaxation methods. Therapist assisted Patient with identifying and implementing healthier coping strategies. Therapist assisted Patient with safety planning; Patient agreed to continue honest communication with Treatment Team while inpatient and identify any SI/HI.  Patient encouraged to seek nearest ER or contact 911 if danger to self or others post discharge.     ASSESSMENT: Patient is a 44 year old female from [place], [place]. Patient present to the ED. Per intake note, \"Patient states she came in to detox off of alcohol, cocaine, fentanyl, amp, and meth. Patient states that she has been doing amphetamines for the last 15 years, starting with different types of diet pills and adderall. She states about a year and a half ago, she began using meth. She states that she probably uses 1-2 bowls per day, usually smoking but sometimes by snorting. Patient states she has also been using about 2 grams of cocaine per day via smoking and snorting. She states that she uses a small amount of heroin a few times a week and that she drinks a pint to a fifth per day of liquor.\"     Therapist met 1:1 with Patient at bedside. Patient was calm and cooperative but got emotional during session. Patient reports she is here for detox because last time she tried on her own it was to hard, \"so I googled and here I am.\" Patient says she is having some marital problems and it upset that she doesn't get to see her children like before. Patient is on observation and unsure that she needs to be here since her withdraws aren't to bad. Patient reports her last use was on Friday before coming to the " hospital. Therapist explained that right now she may feel okay because she hasn't hit full withdraws, to consider staying through observation because she could go into full withdraws. Therapist explained, we would all rather her be here and safe vs thinking she was okay to leave and she go into full withdraws, Patient seemed understandable. Patient denies any SI/HI/AVH at this time.   PLAN:   Patient will receive 24/7 nursing monitoring and daily psychiatrist evaluation by a multidisciplinary team.    Patient will continue stabilization at this time.     Patient is not agreeable for outpatient services on this date.     Public assistance with transportation will not be needed. Family member will provide.          Patient did not sign consent for any family or aftercare on this date.

## 2024-02-17 NOTE — PLAN OF CARE
Goal Outcome Evaluation:  Plan of Care Reviewed With: patient  Patient Agreement with Plan of Care: agrees     Progress: no change  Outcome Evaluation: Pt has denies any SI/HI/AVH reports she wants to discharge Against Medical Advice Educated and encouraged pt to stay over night for observation due to possibility of withdrawal increasing. She is alert, oriented, calm, good mobility, and no distress noted that would diminish driving ability.

## 2024-02-17 NOTE — PLAN OF CARE
Goal Outcome Evaluation:  Plan of Care Reviewed With: patient  Patient Agreement with Plan of Care: agrees        Outcome Evaluation: Patient reports appetite good and sleep fair. Patient rates anxiet 8/10 and depression 6/10 CIWA 5, COWS 7. Patient denies SI, HI, and AVH this shift. Patient rates pain 7/10 body aches;medication discussed and given. Patient rates cravings 8/10 with withdrawal s/s legs cramping, chills, and stuffy head and nose. Patient education;supportive relationships. Patient slept 6 hours at this point in this shift.

## 2024-02-18 NOTE — DISCHARGE SUMMARY
"      PSYCHIATRIC DISCHARGE SUMMARY     Patient Identification:  Name:  Zofia Ortiz  Age:  44 y.o.  Sex:  female  :  1980  MRN:  [id number]  Visit Number:  [id number]    Date of Admission:2024   Date of Discharge: 2024     Discharge Diagnosis:  Principal Problem:    Polysubstance dependence      Admission Diagnosis:  Polysubstance dependence [F19.20]     Hospital Course  Patient is a 44 y.o. female presented with alcohol dependence and polysubstance abuse.  Patient required significant amount of time in the ED to recover from intoxication, then admitted for observation to determine the need for medically supervised detox.  Admission labs with glucose 111, ethanol 48, UDS + meth/amp/cocaine/fentanyl/THC.  Patient's substance abuse history evolved between multiple assessments and providers.  Patient reported most consistent use of alcohol, roughly 1/5 of liquor per day.  She reported a history of intermittent or low-dose use of illicit substances.  Patient questioned the process of observation, apparently believing that she would be given medicine as soon as she was admitted.  She was encouraged to complete the observation.  Given the dangers of alcohol withdrawal.  She was initially agreeable, but later in the day changed her mind and demanded AMA discharge.  She was strongly encouraged to stay to complete observation, with risks of AMA discharge fully explained.  However, she continued to request AMA discharge.  She exhibited no symptoms suggesting a condition that would limit her medical decision making capacity overall, so AMA discharge was approved.    Mental Status Exam upon discharge:   Mood \"fine\"   Affect-congruent, appropriate, stable  Thought Content-goal directed, no delusional material present  Thought process-linear, organized.  Suicidality: No SI  Homicidality: No HI  Perception: No AH/VH    Procedures Performed         Consults:   Consults       No orders found from 2024 to " 2/17/2024.            Pertinent Test Results:   Lab Results (last 7 days)       ** No results found for the last 168 hours. **            Condition on Discharge:  stable    Vital Signs  Temp:  [97.2 °F (36.2 °C)-98.4 °F (36.9 °C)] 98.4 °F (36.9 °C)  Heart Rate:  [84-94] 94  Resp:  [18] 18  BP: (134-137)/(89-92) 134/92    Discharge Disposition:  Left Against Medical Advice    Discharge Medications:     Discharge Medications        Stop These Medications      omeprazole 20 MG capsule  Commonly known as: priLOSEC              Discharge Diet: Normal  Diet Instructions    Resume diet as tolerated           Activity at Discharge: Normal  Activity Instructions    Resume activity as tolerated           Follow-up Appointments  No future appointments.      Test Results Pending at Discharge  None     Time: I spent less than 30 minutes on this discharge activity which included: face-to-face encounter with the patient, reviewing the data in the system, coordination of the care with the nursing staff as well as consultants, documentation, and entering orders.      Clinician:   Neftaly Yao MD  02/18/24  11:38 EST

## 2024-03-08 ENCOUNTER — PRE-ADMISSION TESTING (OUTPATIENT)
Dept: PREADMISSION TESTING | Facility: HOSPITAL | Age: 44
End: 2024-03-08
Payer: COMMERCIAL

## 2024-03-08 VITALS — HEIGHT: 66 IN | BODY MASS INDEX: 25.01 KG/M2 | WEIGHT: 155.65 LBS

## 2024-03-08 LAB
ALBUMIN SERPL-MCNC: 4.5 G/DL (ref 3.5–5.2)
ALBUMIN/GLOB SERPL: 1.8 G/DL
ALP SERPL-CCNC: 62 U/L (ref 39–117)
ALT SERPL W P-5'-P-CCNC: 12 U/L (ref 1–33)
ANION GAP SERPL CALCULATED.3IONS-SCNC: 7 MMOL/L (ref 5–15)
AST SERPL-CCNC: 24 U/L (ref 1–32)
BILIRUB SERPL-MCNC: 0.3 MG/DL (ref 0–1.2)
BUN SERPL-MCNC: 10 MG/DL (ref 6–20)
BUN/CREAT SERPL: 14.1 (ref 7–25)
CALCIUM SPEC-SCNC: 9.5 MG/DL (ref 8.6–10.5)
CHLORIDE SERPL-SCNC: 103 MMOL/L (ref 98–107)
CO2 SERPL-SCNC: 30 MMOL/L (ref 22–29)
CREAT SERPL-MCNC: 0.71 MG/DL (ref 0.57–1)
DEPRECATED RDW RBC AUTO: 41.3 FL (ref 37–54)
EGFRCR SERPLBLD CKD-EPI 2021: 107.7 ML/MIN/1.73
ERYTHROCYTE [DISTWIDTH] IN BLOOD BY AUTOMATED COUNT: 11.8 % (ref 12.3–15.4)
GLOBULIN UR ELPH-MCNC: 2.5 GM/DL
GLUCOSE SERPL-MCNC: 91 MG/DL (ref 65–99)
HCT VFR BLD AUTO: 47.7 % (ref 34–46.6)
HGB BLD-MCNC: 15.8 G/DL (ref 12–15.9)
MCH RBC QN AUTO: 31.4 PG (ref 26.6–33)
MCHC RBC AUTO-ENTMCNC: 33.1 G/DL (ref 31.5–35.7)
MCV RBC AUTO: 94.8 FL (ref 79–97)
PLATELET # BLD AUTO: 211 10*3/MM3 (ref 140–450)
PMV BLD AUTO: 9.5 FL (ref 6–12)
POTASSIUM SERPL-SCNC: 3.9 MMOL/L (ref 3.5–5.2)
PROT SERPL-MCNC: 7 G/DL (ref 6–8.5)
RBC # BLD AUTO: 5.03 10*6/MM3 (ref 3.77–5.28)
SODIUM SERPL-SCNC: 140 MMOL/L (ref 136–145)
WBC NRBC COR # BLD AUTO: 4.41 10*3/MM3 (ref 3.4–10.8)

## 2024-03-08 PROCEDURE — 85027 COMPLETE CBC AUTOMATED: CPT

## 2024-03-08 PROCEDURE — 80053 COMPREHEN METABOLIC PANEL: CPT

## 2024-03-08 PROCEDURE — 36415 COLL VENOUS BLD VENIPUNCTURE: CPT

## 2024-03-08 NOTE — PAT
"Pt has hematoma to middle forehead with bruising to bilateral eyes ( right greater than left). She reports that (possibly 2/25/2024 but not certain of date) , she was in her kitchen and \" whacked my head on brick edge of countertop\" . She reports she was dazed and possibly lost some time, just remembers standing in Kitchen not aware of what had happened. She felt like she was \" in a fog\" for a few days after , and a headache.  \" Like I was walking around wearing a helmet\" . Just today able to tolerate glasses on her face or touching the top of her head .     I talked with Dr Woods ( Anesthesia) who requested that pt see her PCP to see if any testing is warranted before proceeding with surgery . Pt verbalized understanding and will call Dr LIZZ Mercado as soon as PAT appt is finished.      An arrival time for procedure was not provided during PAT visit. If patient had any questions or concerns about their arrival time, they were instructed to contact their surgeon/physician.  Additionally, if the patient referred to an arrival time that was acquired from their my chart account, patient was encouraged to verify that time with their surgeon/physician. Arrival times are NOT provided in Pre Admission Testing Department.     Patient to apply Chlorhexadine wipes  to surgical area (as instructed) the night before procedure and the AM of procedure. Wipes provided.   "

## 2024-03-08 NOTE — DISCHARGE INSTRUCTIONS
Patient to apply Chlorhexadine wipes  to surgical area (as instructed) the night before procedure and the AM of procedure. Wipes provided.     Patient viewed general PAT education video as instructed in their preoperative information received from their surgeon.  Patient stated the general PAT education video was viewed in its entirety and survey completed.  Copies of PAT general education handouts (Incentive Spirometry, Meds to Beds Program, Patient Belongings, Pre-op skin preparation instructions, Blood Glucose testing, Visitor policy, Surgery FAQ, Code H) distributed to patient if not printed. Education related to the PAT pass and skin preparation for surgery (if applicable) completed in PAT as a reinforcement to PAT education video. Patient instructed to return PAT pass provided today as well as completed skin preparation sheet (if applicable) on the day of procedure.     Additionally if patient had not viewed video yet but intended to view it at home or in our waiting area, then referred them to the handout with QR code/link provided during PAT visit.  Instructed patient to complete survey after viewing the video in its entirety.  Encouraged patient/family to read PAT general education handouts thoroughly and notify PAT staff with any questions or concerns. Patient verbalized understanding of all information and priority content.

## 2024-03-11 ENCOUNTER — TRANSCRIBE ORDERS (OUTPATIENT)
Dept: ADMINISTRATIVE | Facility: HOSPITAL | Age: 44
End: 2024-03-11
Payer: COMMERCIAL

## 2024-03-11 ENCOUNTER — HOSPITAL ENCOUNTER (OUTPATIENT)
Dept: CT IMAGING | Facility: HOSPITAL | Age: 44
Discharge: HOME | End: 2024-03-11
Admitting: INTERNAL MEDICINE
Payer: COMMERCIAL

## 2024-03-11 DIAGNOSIS — S09.90XA TRAUMATIC INJURY OF HEAD, INITIAL ENCOUNTER: ICD-10-CM

## 2024-03-11 DIAGNOSIS — S09.90XA TRAUMATIC INJURY OF HEAD, INITIAL ENCOUNTER: Primary | ICD-10-CM

## 2024-03-11 PROCEDURE — 70450 CT HEAD/BRAIN W/O DYE: CPT

## 2024-03-15 ENCOUNTER — HOSPITAL ENCOUNTER (OUTPATIENT)
Facility: HOSPITAL | Age: 44
Setting detail: HOSPITAL OUTPATIENT SURGERY
Discharge: HOME | End: 2024-03-15
Attending: STUDENT IN AN ORGANIZED HEALTH CARE EDUCATION/TRAINING PROGRAM | Admitting: STUDENT IN AN ORGANIZED HEALTH CARE EDUCATION/TRAINING PROGRAM
Payer: COMMERCIAL

## 2024-03-15 ENCOUNTER — ANESTHESIA (OUTPATIENT)
Dept: PERIOP | Facility: HOSPITAL | Age: 44
End: 2024-03-15
Payer: COMMERCIAL

## 2024-03-15 ENCOUNTER — ANESTHESIA EVENT (OUTPATIENT)
Dept: PERIOP | Facility: HOSPITAL | Age: 44
End: 2024-03-15
Payer: COMMERCIAL

## 2024-03-15 VITALS
TEMPERATURE: 98.1 F | OXYGEN SATURATION: 100 % | DIASTOLIC BLOOD PRESSURE: 89 MMHG | HEART RATE: 54 BPM | WEIGHT: 155.65 LBS | BODY MASS INDEX: 25.01 KG/M2 | HEIGHT: 66 IN | SYSTOLIC BLOOD PRESSURE: 130 MMHG | RESPIRATION RATE: 17 BRPM

## 2024-03-15 DIAGNOSIS — R10.9 ABDOMINAL PAIN: ICD-10-CM

## 2024-03-15 DIAGNOSIS — K80.20 CALCULUS OF GALLBLADDER WITHOUT CHOLECYSTITIS WITHOUT OBSTRUCTION: Primary | ICD-10-CM

## 2024-03-15 PROCEDURE — 88304 TISSUE EXAM BY PATHOLOGIST: CPT | Performed by: STUDENT IN AN ORGANIZED HEALTH CARE EDUCATION/TRAINING PROGRAM

## 2024-03-15 PROCEDURE — 25810000003 LACTATED RINGERS PER 1000 ML: Performed by: ANESTHESIOLOGY

## 2024-03-15 PROCEDURE — 25010000002 FENTANYL CITRATE (PF) 100 MCG/2ML SOLUTION: Performed by: NURSE ANESTHETIST, CERTIFIED REGISTERED

## 2024-03-15 PROCEDURE — 25010000002 SUGAMMADEX 200 MG/2ML SOLUTION: Performed by: NURSE ANESTHETIST, CERTIFIED REGISTERED

## 2024-03-15 PROCEDURE — 25010000002 GLYCOPYRROLATE 0.4 MG/2ML SOLUTION: Performed by: NURSE ANESTHETIST, CERTIFIED REGISTERED

## 2024-03-15 PROCEDURE — 25010000002 HYDROMORPHONE 1 MG/ML SOLUTION

## 2024-03-15 PROCEDURE — 25010000002 CEFAZOLIN PER 500 MG: Performed by: STUDENT IN AN ORGANIZED HEALTH CARE EDUCATION/TRAINING PROGRAM

## 2024-03-15 PROCEDURE — 25010000002 ESMOLOL 100 MG/10ML SOLUTION: Performed by: NURSE ANESTHETIST, CERTIFIED REGISTERED

## 2024-03-15 PROCEDURE — 25010000002 PROPOFOL 10 MG/ML EMULSION: Performed by: NURSE ANESTHETIST, CERTIFIED REGISTERED

## 2024-03-15 PROCEDURE — 25010000002 ONDANSETRON PER 1 MG: Performed by: NURSE ANESTHETIST, CERTIFIED REGISTERED

## 2024-03-15 PROCEDURE — 25810000003 SODIUM CHLORIDE PER 500 ML: Performed by: STUDENT IN AN ORGANIZED HEALTH CARE EDUCATION/TRAINING PROGRAM

## 2024-03-15 PROCEDURE — 25010000002 DEXAMETHASONE SODIUM PHOSPHATE 10 MG/ML SOLUTION: Performed by: NURSE ANESTHETIST, CERTIFIED REGISTERED

## 2024-03-15 PROCEDURE — 25010000002 FENTANYL CITRATE (PF) 50 MCG/ML SOLUTION

## 2024-03-15 PROCEDURE — 25010000002 DROPERIDOL PER 5 MG: Performed by: NURSE ANESTHETIST, CERTIFIED REGISTERED

## 2024-03-15 PROCEDURE — 25010000002 MIDAZOLAM PER 1 MG: Performed by: ANESTHESIOLOGY

## 2024-03-15 DEVICE — LIGACLIP 10-M/L, 10MM ENDOSCOPIC ROTATING MULTIPLE CLIP APPLIERS
Type: IMPLANTABLE DEVICE | Site: ABDOMEN | Status: FUNCTIONAL
Brand: LIGACLIP

## 2024-03-15 DEVICE — ABSORBABLE HEMOSTAT (OXIDIZED REGENERATED CELLULOSE)
Type: IMPLANTABLE DEVICE | Site: ABDOMEN | Status: FUNCTIONAL
Brand: SURGICEL

## 2024-03-15 RX ORDER — ROCURONIUM BROMIDE 10 MG/ML
INJECTION, SOLUTION INTRAVENOUS AS NEEDED
Status: DISCONTINUED | OUTPATIENT
Start: 2024-03-15 | End: 2024-03-15 | Stop reason: SURG

## 2024-03-15 RX ORDER — IPRATROPIUM BROMIDE AND ALBUTEROL SULFATE 2.5; .5 MG/3ML; MG/3ML
3 SOLUTION RESPIRATORY (INHALATION) ONCE AS NEEDED
Status: DISCONTINUED | OUTPATIENT
Start: 2024-03-15 | End: 2024-03-15 | Stop reason: HOSPADM

## 2024-03-15 RX ORDER — NALOXONE HCL 0.4 MG/ML
0.4 VIAL (ML) INJECTION AS NEEDED
Status: DISCONTINUED | OUTPATIENT
Start: 2024-03-15 | End: 2024-03-15 | Stop reason: HOSPADM

## 2024-03-15 RX ORDER — SODIUM CHLORIDE 0.9 % (FLUSH) 0.9 %
3-10 SYRINGE (ML) INJECTION AS NEEDED
Status: DISCONTINUED | OUTPATIENT
Start: 2024-03-15 | End: 2024-03-15 | Stop reason: HOSPADM

## 2024-03-15 RX ORDER — DEXAMETHASONE SODIUM PHOSPHATE 10 MG/ML
INJECTION, SOLUTION INTRAMUSCULAR; INTRAVENOUS AS NEEDED
Status: DISCONTINUED | OUTPATIENT
Start: 2024-03-15 | End: 2024-03-15 | Stop reason: SURG

## 2024-03-15 RX ORDER — HYDROMORPHONE HYDROCHLORIDE 1 MG/ML
0.5 INJECTION, SOLUTION INTRAMUSCULAR; INTRAVENOUS; SUBCUTANEOUS
Status: DISCONTINUED | OUTPATIENT
Start: 2024-03-15 | End: 2024-03-15 | Stop reason: HOSPADM

## 2024-03-15 RX ORDER — FENTANYL CITRATE 50 UG/ML
INJECTION, SOLUTION INTRAMUSCULAR; INTRAVENOUS
Status: DISCONTINUED
Start: 2024-03-15 | End: 2024-03-15 | Stop reason: HOSPADM

## 2024-03-15 RX ORDER — FAMOTIDINE 10 MG/ML
20 INJECTION, SOLUTION INTRAVENOUS ONCE
Status: CANCELLED | OUTPATIENT
Start: 2024-03-15 | End: 2024-03-15

## 2024-03-15 RX ORDER — HYDROCODONE BITARTRATE AND ACETAMINOPHEN 5; 325 MG/1; MG/1
1 TABLET ORAL ONCE AS NEEDED
Status: DISCONTINUED | OUTPATIENT
Start: 2024-03-15 | End: 2024-03-15 | Stop reason: HOSPADM

## 2024-03-15 RX ORDER — BUPIVACAINE HYDROCHLORIDE AND EPINEPHRINE 2.5; 5 MG/ML; UG/ML
INJECTION, SOLUTION INFILTRATION; PERINEURAL AS NEEDED
Status: DISCONTINUED | OUTPATIENT
Start: 2024-03-15 | End: 2024-03-15 | Stop reason: HOSPADM

## 2024-03-15 RX ORDER — SODIUM CHLORIDE 9 MG/ML
40 INJECTION, SOLUTION INTRAVENOUS AS NEEDED
Status: DISCONTINUED | OUTPATIENT
Start: 2024-03-15 | End: 2024-03-15 | Stop reason: HOSPADM

## 2024-03-15 RX ORDER — SODIUM CHLORIDE 0.9 % (FLUSH) 0.9 %
10 SYRINGE (ML) INJECTION AS NEEDED
Status: DISCONTINUED | OUTPATIENT
Start: 2024-03-15 | End: 2024-03-15 | Stop reason: HOSPADM

## 2024-03-15 RX ORDER — ESMOLOL HYDROCHLORIDE 10 MG/ML
INJECTION INTRAVENOUS AS NEEDED
Status: DISCONTINUED | OUTPATIENT
Start: 2024-03-15 | End: 2024-03-15 | Stop reason: SURG

## 2024-03-15 RX ORDER — DROPERIDOL 2.5 MG/ML
0.62 INJECTION, SOLUTION INTRAMUSCULAR; INTRAVENOUS ONCE AS NEEDED
Status: DISCONTINUED | OUTPATIENT
Start: 2024-03-15 | End: 2024-03-15 | Stop reason: HOSPADM

## 2024-03-15 RX ORDER — HYDRALAZINE HYDROCHLORIDE 20 MG/ML
5 INJECTION INTRAMUSCULAR; INTRAVENOUS
Status: DISCONTINUED | OUTPATIENT
Start: 2024-03-15 | End: 2024-03-15 | Stop reason: HOSPADM

## 2024-03-15 RX ORDER — PROMETHAZINE HYDROCHLORIDE 25 MG/1
25 TABLET ORAL ONCE AS NEEDED
Status: DISCONTINUED | OUTPATIENT
Start: 2024-03-15 | End: 2024-03-15 | Stop reason: HOSPADM

## 2024-03-15 RX ORDER — HYDROCODONE BITARTRATE AND ACETAMINOPHEN 5; 325 MG/1; MG/1
TABLET ORAL
Status: COMPLETED
Start: 2024-03-15 | End: 2024-03-15

## 2024-03-15 RX ORDER — FENTANYL CITRATE 50 UG/ML
INJECTION, SOLUTION INTRAMUSCULAR; INTRAVENOUS
Status: COMPLETED
Start: 2024-03-15 | End: 2024-03-15

## 2024-03-15 RX ORDER — SODIUM CHLORIDE 0.9 % (FLUSH) 0.9 %
10 SYRINGE (ML) INJECTION EVERY 12 HOURS SCHEDULED
Status: DISCONTINUED | OUTPATIENT
Start: 2024-03-15 | End: 2024-03-15 | Stop reason: HOSPADM

## 2024-03-15 RX ORDER — ONDANSETRON 2 MG/ML
4 INJECTION INTRAMUSCULAR; INTRAVENOUS ONCE AS NEEDED
Status: DISCONTINUED | OUTPATIENT
Start: 2024-03-15 | End: 2024-03-15 | Stop reason: HOSPADM

## 2024-03-15 RX ORDER — MIDAZOLAM HYDROCHLORIDE 1 MG/ML
1 INJECTION INTRAMUSCULAR; INTRAVENOUS
Status: DISCONTINUED | OUTPATIENT
Start: 2024-03-15 | End: 2024-03-15 | Stop reason: HOSPADM

## 2024-03-15 RX ORDER — ONDANSETRON 2 MG/ML
INJECTION INTRAMUSCULAR; INTRAVENOUS AS NEEDED
Status: DISCONTINUED | OUTPATIENT
Start: 2024-03-15 | End: 2024-03-15 | Stop reason: SURG

## 2024-03-15 RX ORDER — SODIUM CHLORIDE, SODIUM LACTATE, POTASSIUM CHLORIDE, CALCIUM CHLORIDE 600; 310; 30; 20 MG/100ML; MG/100ML; MG/100ML; MG/100ML
9 INJECTION, SOLUTION INTRAVENOUS CONTINUOUS
Status: DISCONTINUED | OUTPATIENT
Start: 2024-03-15 | End: 2024-03-15 | Stop reason: HOSPADM

## 2024-03-15 RX ORDER — LIDOCAINE HYDROCHLORIDE 10 MG/ML
INJECTION, SOLUTION EPIDURAL; INFILTRATION; INTRACAUDAL; PERINEURAL AS NEEDED
Status: DISCONTINUED | OUTPATIENT
Start: 2024-03-15 | End: 2024-03-15 | Stop reason: SURG

## 2024-03-15 RX ORDER — LIDOCAINE HYDROCHLORIDE 10 MG/ML
0.5 INJECTION, SOLUTION EPIDURAL; INFILTRATION; INTRACAUDAL; PERINEURAL ONCE AS NEEDED
Status: COMPLETED | OUTPATIENT
Start: 2024-03-15 | End: 2024-03-15

## 2024-03-15 RX ORDER — FENTANYL CITRATE 50 UG/ML
INJECTION, SOLUTION INTRAMUSCULAR; INTRAVENOUS AS NEEDED
Status: DISCONTINUED | OUTPATIENT
Start: 2024-03-15 | End: 2024-03-15 | Stop reason: SURG

## 2024-03-15 RX ORDER — PROMETHAZINE HYDROCHLORIDE 25 MG/1
25 SUPPOSITORY RECTAL ONCE AS NEEDED
Status: DISCONTINUED | OUTPATIENT
Start: 2024-03-15 | End: 2024-03-15 | Stop reason: HOSPADM

## 2024-03-15 RX ORDER — GLYCOPYRROLATE 0.2 MG/ML
INJECTION INTRAMUSCULAR; INTRAVENOUS AS NEEDED
Status: DISCONTINUED | OUTPATIENT
Start: 2024-03-15 | End: 2024-03-15 | Stop reason: SURG

## 2024-03-15 RX ORDER — FAMOTIDINE 20 MG/1
20 TABLET, FILM COATED ORAL ONCE
Status: COMPLETED | OUTPATIENT
Start: 2024-03-15 | End: 2024-03-15

## 2024-03-15 RX ORDER — PROPOFOL 10 MG/ML
VIAL (ML) INTRAVENOUS AS NEEDED
Status: DISCONTINUED | OUTPATIENT
Start: 2024-03-15 | End: 2024-03-15 | Stop reason: SURG

## 2024-03-15 RX ORDER — TRAMADOL HYDROCHLORIDE 50 MG/1
50 TABLET ORAL EVERY 6 HOURS PRN
Qty: 15 TABLET | Refills: 0 | Status: SHIPPED | OUTPATIENT
Start: 2024-03-15

## 2024-03-15 RX ORDER — PHENYLEPHRINE HCL IN 0.9% NACL 1 MG/10 ML
SYRINGE (ML) INTRAVENOUS AS NEEDED
Status: DISCONTINUED | OUTPATIENT
Start: 2024-03-15 | End: 2024-03-15 | Stop reason: SURG

## 2024-03-15 RX ORDER — MAGNESIUM HYDROXIDE 1200 MG/15ML
LIQUID ORAL AS NEEDED
Status: DISCONTINUED | OUTPATIENT
Start: 2024-03-15 | End: 2024-03-15 | Stop reason: HOSPADM

## 2024-03-15 RX ORDER — SODIUM CHLORIDE 9 MG/ML
INJECTION, SOLUTION INTRAVENOUS AS NEEDED
Status: DISCONTINUED | OUTPATIENT
Start: 2024-03-15 | End: 2024-03-15 | Stop reason: HOSPADM

## 2024-03-15 RX ORDER — LABETALOL HYDROCHLORIDE 5 MG/ML
5 INJECTION, SOLUTION INTRAVENOUS
Status: DISCONTINUED | OUTPATIENT
Start: 2024-03-15 | End: 2024-03-15 | Stop reason: HOSPADM

## 2024-03-15 RX ORDER — MEPERIDINE HYDROCHLORIDE 25 MG/ML
12.5 INJECTION INTRAMUSCULAR; INTRAVENOUS; SUBCUTANEOUS
Status: DISCONTINUED | OUTPATIENT
Start: 2024-03-15 | End: 2024-03-15 | Stop reason: HOSPADM

## 2024-03-15 RX ORDER — DROPERIDOL 2.5 MG/ML
0.62 INJECTION, SOLUTION INTRAMUSCULAR; INTRAVENOUS
Status: DISCONTINUED | OUTPATIENT
Start: 2024-03-15 | End: 2024-03-15 | Stop reason: HOSPADM

## 2024-03-15 RX ORDER — FENTANYL CITRATE 50 UG/ML
50 INJECTION, SOLUTION INTRAMUSCULAR; INTRAVENOUS
Status: DISCONTINUED | OUTPATIENT
Start: 2024-03-15 | End: 2024-03-15 | Stop reason: HOSPADM

## 2024-03-15 RX ORDER — EPHEDRINE SULFATE 50 MG/ML
INJECTION INTRAVENOUS AS NEEDED
Status: DISCONTINUED | OUTPATIENT
Start: 2024-03-15 | End: 2024-03-15 | Stop reason: SURG

## 2024-03-15 RX ORDER — SODIUM CHLORIDE 0.9 % (FLUSH) 0.9 %
3 SYRINGE (ML) INJECTION EVERY 12 HOURS SCHEDULED
Status: DISCONTINUED | OUTPATIENT
Start: 2024-03-15 | End: 2024-03-15 | Stop reason: HOSPADM

## 2024-03-15 RX ADMIN — DROPERIDOL 0.62 MG: 2.5 INJECTION, SOLUTION INTRAMUSCULAR; INTRAVENOUS at 14:07

## 2024-03-15 RX ADMIN — LIDOCAINE HYDROCHLORIDE 0.5 ML: 10 INJECTION, SOLUTION EPIDURAL; INFILTRATION; INTRACAUDAL; PERINEURAL at 11:31

## 2024-03-15 RX ADMIN — FENTANYL CITRATE 50 MCG: 50 INJECTION, SOLUTION INTRAMUSCULAR; INTRAVENOUS at 13:25

## 2024-03-15 RX ADMIN — HYDROMORPHONE HYDROCHLORIDE 0.5 MG: 1 INJECTION, SOLUTION INTRAMUSCULAR; INTRAVENOUS; SUBCUTANEOUS at 14:02

## 2024-03-15 RX ADMIN — SUGAMMADEX 200 MG: 100 INJECTION, SOLUTION INTRAVENOUS at 13:25

## 2024-03-15 RX ADMIN — SODIUM CHLORIDE 2000 MG: 900 INJECTION INTRAVENOUS at 12:14

## 2024-03-15 RX ADMIN — PROPOFOL 150 MG: 10 INJECTION, EMULSION INTRAVENOUS at 12:09

## 2024-03-15 RX ADMIN — FENTANYL CITRATE 50 MCG: 50 INJECTION, SOLUTION INTRAMUSCULAR; INTRAVENOUS at 13:49

## 2024-03-15 RX ADMIN — HYDROCODONE BITARTRATE AND ACETAMINOPHEN 1 TABLET: 5; 325 TABLET ORAL at 15:17

## 2024-03-15 RX ADMIN — HYDROMORPHONE HYDROCHLORIDE 0.5 MG: 1 INJECTION, SOLUTION INTRAMUSCULAR; INTRAVENOUS; SUBCUTANEOUS at 13:55

## 2024-03-15 RX ADMIN — ROCURONIUM BROMIDE 50 MG: 10 INJECTION INTRAVENOUS at 12:09

## 2024-03-15 RX ADMIN — MIDAZOLAM HYDROCHLORIDE 1 MG: 1 INJECTION, SOLUTION INTRAMUSCULAR; INTRAVENOUS at 11:37

## 2024-03-15 RX ADMIN — EPHEDRINE SULFATE 10 MG: 50 INJECTION INTRAVENOUS at 12:53

## 2024-03-15 RX ADMIN — Medication 100 MCG: at 12:54

## 2024-03-15 RX ADMIN — ESMOLOL HYDROCHLORIDE 100 MG: 10 INJECTION, SOLUTION INTRAVENOUS at 12:09

## 2024-03-15 RX ADMIN — ONDANSETRON 4 MG: 2 INJECTION INTRAMUSCULAR; INTRAVENOUS at 13:20

## 2024-03-15 RX ADMIN — EPHEDRINE SULFATE 5 MG: 50 INJECTION INTRAVENOUS at 12:51

## 2024-03-15 RX ADMIN — LIDOCAINE HYDROCHLORIDE 50 MG: 10 INJECTION, SOLUTION EPIDURAL; INFILTRATION; INTRACAUDAL; PERINEURAL at 12:09

## 2024-03-15 RX ADMIN — EPHEDRINE SULFATE 5 MG: 50 INJECTION INTRAVENOUS at 12:47

## 2024-03-15 RX ADMIN — DEXAMETHASONE SODIUM PHOSPHATE 10 MG: 10 INJECTION, SOLUTION INTRAMUSCULAR; INTRAVENOUS at 12:17

## 2024-03-15 RX ADMIN — Medication 200 MCG: at 13:06

## 2024-03-15 RX ADMIN — GLYCOPYRROLATE 0.2 MG: 0.4 INJECTION INTRAMUSCULAR; INTRAVENOUS at 12:51

## 2024-03-15 RX ADMIN — SODIUM CHLORIDE, POTASSIUM CHLORIDE, SODIUM LACTATE AND CALCIUM CHLORIDE: 600; 310; 30; 20 INJECTION, SOLUTION INTRAVENOUS at 13:20

## 2024-03-15 RX ADMIN — FENTANYL CITRATE 50 MCG: 50 INJECTION, SOLUTION INTRAMUSCULAR; INTRAVENOUS at 12:28

## 2024-03-15 RX ADMIN — SODIUM CHLORIDE, POTASSIUM CHLORIDE, SODIUM LACTATE AND CALCIUM CHLORIDE 9 ML/HR: 600; 310; 30; 20 INJECTION, SOLUTION INTRAVENOUS at 11:31

## 2024-03-15 RX ADMIN — FAMOTIDINE 20 MG: 20 TABLET, FILM COATED ORAL at 11:31

## 2024-03-15 RX ADMIN — Medication 150 MCG: at 12:56

## 2024-03-15 NOTE — NURSING NOTE
Pt initially c/o 10/10 post-surgical pain upon waking in the PACU- administered ordered PRN Dilaudid and Fentanyl- Dilaudid 2nd dose administered early due to patient screaming in PACU; Initial HR in upper 80s, /105 while Patient was reporting 10/10 pain. After multiple doses administered HR decreased to 47-53 bpm and BP decreased to 130/89- Patient appeared to be sleeping in between care and resting comfortably although rating pain 9/10 after multiple doses administered. Withheld additional dose of Fentanyl due to HR - Returned to Accudose machine.

## 2024-03-15 NOTE — ANESTHESIA PROCEDURE NOTES
Airway  Urgency: elective    Date/Time: 3/15/2024 12:13 PM  Airway not difficult    General Information and Staff    Patient location during procedure: OR  CRNA/CAA: Jeremy Mccoy CRNA    Indications and Patient Condition  Indications for airway management: airway protection    Preoxygenated: yes  MILS not maintained throughout  Mask difficulty assessment: 1 - vent by mask    Final Airway Details  Final airway type: endotracheal airway      Successful airway: ETT  Cuffed: yes   Successful intubation technique: direct laryngoscopy  Endotracheal tube insertion site: oral  Blade: Chuy  Blade size: 3  ETT size (mm): 7.0  Cormack-Lehane Classification: grade I - full view of glottis  Placement verified by: chest auscultation and capnometry   Measured from: lips  ETT/EBT  to lips (cm): 20  Number of attempts at approach: 1  Assessment: lips, teeth, and gum same as pre-op and atraumatic intubation    Additional Comments  Negative epigastric sounds, Breath sound equal bilaterally with symmetric chest rise and fall

## 2024-03-15 NOTE — ANESTHESIA PREPROCEDURE EVALUATION
Anesthesia Evaluation     Patient summary reviewed and Nursing notes reviewed   no history of anesthetic complications:   NPO Solid Status: > 8 hours  NPO Liquid Status: > 8 hours           Airway   Mallampati: II  TM distance: >3 FB  Neck ROM: full  No difficulty expected  Dental      Pulmonary - normal exam   (+) a smoker,shortness of breath, sleep apnea  Cardiovascular - normal exam        Neuro/Psych  GI/Hepatic/Renal/Endo    (+) morbid obesity, GERD    Musculoskeletal     Abdominal    Substance History   (+) alcohol use     OB/GYN          Other                    Anesthesia Plan    ASA 2     general     intravenous induction     Anesthetic plan, risks, benefits, and alternatives have been provided, discussed and informed consent has been obtained with: patient.    Plan discussed with CRNA.    CODE STATUS:

## 2024-03-15 NOTE — OP NOTE
OPERATIVE NOTE    Patient Name:  Zofia Ortiz  YOB: 1980  [id number]     Date of Surgery:  3/15/2024       Pre-op Diagnosis: Cholelithiasis    Post-op Diagnosis: Cholelithiasis      Procedure:   Laparoscopic cholecystectomy    Surgeon: Shimon Rebollar MD MS    Assistant:   None    Anesthesia: General     Estimated Blood Loss:  50 mL    Complications: None apparent    Implants:    Implant Name Type Inv. Item Serial No.  Lot No. LRB No. Used Action   CLIPAPPLR M/ ENDO LIGACLIP ROT 10MM MD/LG - [id number] Implant CLIPAPPLR M/ ENDO LIGACLIP ROT 10MM MD/LG  [employer]  DIV OF J AND J  N/A 1 Implanted   HEMOST ABS SURGICEL ORIG 2X3IN STRL - [id number] Implant HEMOST ABS SURGICEL ORIG 2X3IN STRL  [employer]  DIV OF J AND J [id number] N/A 1 Implanted       Specimen:          A. Gallbladder      Findings: Dilated gallbladder with large 3.5 cm stone    Indications:  Mrs. Zofia Ortiz is a 44 year old woman presenting with biliary colic and a large impacting stone in the gallbladder. She was offered laparoscopic cholecystectomy. The risks and benefits of the procedure were discussed including, but not limited to: bleeding, infection, injury to adjacent structures, need for re-intervention (operative intervention, drain placement, etc.), and medical complications due to the patient's underlying co-morbidities and the risks of general anesthesia. The risks of bile leak and bile duct injury were reviewed which are specific to this procedure.     The patient wishes to proceed and consented for surgery. All of the patient's questions were answered.       Description of Procedure:   After informed consent was obtained, patient identification verified, and pre-operative checklist completed, the patient was brought to the Operating Room and placed comfortably in the supine position on the operating table.      Intraoperatively, the patient was given Ancef as antimicrobial prophylaxis for surgical site  infection.    General anesthesia was administered without complication and the patient was intubated without difficulty.  The patient’s abdomen was clipped, prepped with Chloraprep solution, and draped in the usual sterile fashion.    After an appropriate surgical pause and time-out was completed, a curvilinear incision was made in a natural skin line in the infra-umbilical area after local anesthetic was infiltrated at the proposed incision site.      a Veress needle was inserted in the left upper quadrant. Position was confirmed with saline drop test and low opening pressure. The abdomen was insufflated to 15 mmHg. A 5 mm port was inserted at the umbilicus using the Optiview technique. The abdomen was inspected and no iatrogenic injury was identified. Additional ports were placed under direct visualization in the following locations: a 12 mm trocar in the right epigastrium and two 5 mm trocars along the right costal margin.  The abdomen was inspected and no abnormalities were found.  The table was placed in the reverse Trendelenburg position with the right side elevated.    The dome of the gallbladder was grasped with an atraumatic grasper passed through the lateral port and retracted over the dome of the liver.  The infundibulum was also grasped with an atraumatic grasper through the mid-clavicular port and retracted towards the right lower quadrant.  This maneuver exposed Calot’s triangle.  The peritoneum overlying the gallbladder infundibulum was then incised and the cystic duct and cystic artery were identified and circumferentially dissected.  A critical view was obtained. The cystic duct and cystic artery were then doubly clipped and divided close to the gallbladder.    The gallbladder was then dissected from its peritoneal attachments by electrocautery. It was densely adherent to the liver likely due to chronic inflammation. Adequate hemostasis was checked and achieved and the gallbladder was removed using  an endoscopic retrieval bag placed through the 12 mm port.  The gallbladder and contents were passed off the operative field and sent to Pathology as a permanent specimen.  The gallbladder fossa was copiously irrigated with warm sterile saline solution and adequate hemostasis was again checked and achieved.  There was no evidence of bleeding of the gallbladder fossa or cystic artery or leakage of bile from the cystic duct stump. A small piece of Surgicel was left in place at the location of gallbladder adherence to the liver due to some oozing while the gallbladder was being removed.    The fascia at the 12 mm port site was closed with an interrupted 0-Vicryl suture using an Endo Close device. Secondary trocars were removed under direct vision and no bleeding was noted.  The laparoscope was withdrawn and the umbilical trocar removed.  The abdomen was allowed to collapse. The skin was closed with subcuticular sutures of 4-0 Monocryl.  Adhesive glue was applied to all incision sites.    All sponge and needle counts were correct x2 at the completion of the procedure. The patient recovered from anesthesia, was extubated in the Operating Room and was transported to the PACU in stable condition.      Shimon Rebollar MD     Date: 3/15/2024  Time: 13:31 EDT

## 2024-03-15 NOTE — DISCHARGE INSTRUCTIONS
[facility]:  DISCHARGE INSTRUCTIONS  Dr. Shimon Rebollar    DIET  Okay to resume a regular diet.      PAIN MANAGEMENT  Pain is normal after surgery, but should be able to be managed.     Recommend alternatin mg acetaminophen (Tylenol) every 6 hours*   600-800 mg ibuprofen (Motrin, Advil, etc.) every 6 hours  *Do not take more than 4000 mg of Tylenol in a single day.     If you received a prescription for pain medication after surgery, use this for breakthrough moderate or severe pain if not covered by acetaminophen or ibuprofen.  Okay to use warm and cool compresses.     Bowel Regimen  Prescribed pain medications may cause constipation. Any over-the-counter bowel regimen medications will help with these symptoms.     Recommended regimen:  Miralax 17g in zero sugar Gatorade/Powerade once daily  Senna laxative once to twice daily  Metamucil or other fiber supplement daily  If still constipated - milk of Magnesia or okay to try suppositories or enemas unless directed otherwise by surgeon      DISCHARGE ACTIVITY  Activity is as tolerated.   Okay to walk the night of surgery. Recommend walking at least 4 times daily to prevent complications  No excessive twisting/bending/lifting greater than 20 lbs for 2 weeks.  May return to more strenuous exercise as pain and lifting restrictions allow. Would avoid strenuous activity for at least 1-2 weeks to allow incision to heal.    Driving  You may not drive within 24 hour of receiving narcotic pain medication.   Okay to drive when not taking narcotic pain medication and your incision is not causing limitations with your reaction speed to traffic.    Return to work/school  You may return to work/school in 1-2 weeks with the above restrictions.  You may return to work/school without restrictions in 4 weeks or when your pain/activity allows.    WOUND CARE    Shower/Bathing  You may shower after 24 hours from the surgical procedure.   No tub baths, do not  submerge the incision underwater in a tub bath etc.      Wound Dressing  If dressed with adhesive glue, okay to follow shower/bathing instructions above. Keep site clean and dry.    If dressed with gauze bandages and steri strips. Okay to remove the outer bandage immediately after exiting your first shower and remove the Steri-Strips if still in place after 7 days.    Please call our office, [phone], if you develop increased pain, redness, yellow/purulent discharge, or fevers/chills as this may indicate an infection      FOLLOW-UP  You will be scheduled a follow-up appointment 1-2 weeks after discharge.   [facility] Office will call you to schedule.    If you do not hear from anyone to schedule, please call [phone] to ask for an appointment 2 weeks from your surgery or discharge date.        CONCERNING SIGNS AND SYMPTOMS  1. Fevers/chills/flu-like symptoms  2. Increasing pain at the surgical site  3. Redness around incisions  4. Purulent drainage from incisions  5. Any other symptoms that are concerning to you    If you develop any of the signs or symptoms above, please call our office ([phone]) or after-hours line (). If unable to reach us, or symptoms are severe, please go to the ER for evaluation.

## 2024-03-15 NOTE — INTERVAL H&P NOTE
"[facility] Pre-op    Full history and physical note from office is attached.    /92 (BP Location: Right arm, Patient Position: Lying)   Pulse 72   Temp 97.4 °F (36.3 °C) (Temporal)   Resp 18   Ht 167.6 cm (66\")   Wt 70.6 kg (155 lb 10.3 oz)   LMP 04/17/2019   SpO2 99%   BMI 25.12 kg/m²     LAB Results:  Lab Results   Component Value Date    WBC 4.41 03/08/2024    HGB 15.8 03/08/2024    HCT 47.7 (H) 03/08/2024    MCV 94.8 03/08/2024     03/08/2024    NEUTROABS 2.68 02/16/2024    GLUCOSE 91 03/08/2024    BUN 10 03/08/2024    CREATININE 0.71 03/08/2024    EGFRIFNONA 86 09/29/2021    EGFRIFAFRI 98 04/11/2019     03/08/2024    K 3.9 03/08/2024     03/08/2024    CO2 30.0 (H) 03/08/2024    MG 1.9 02/16/2024    CALCIUM 9.5 03/08/2024    ALBUMIN 4.5 03/08/2024    AST 24 03/08/2024    ALT 12 03/08/2024    BILITOT 0.3 03/08/2024    INR 1.02 06/12/2023     Study Result    Narrative & Impression   CLINICAL HISTORY: Right upper quadrant pain.     COMPARISON: None available.     TECHNIQUE: Sonography of the gallbladder was obtained     FINDINGS: In the fundus of the gallbladder is a 3.3 cm shadowing  calculus.  Wall is mildly thickened at 4 mm.  Gallbladder is tender.  No  pericholecystic fluid is identified.  The common bile duct is normal in  diameter at 4 mm.     IMPRESSION:     POSSIBLE CHOLECYSTITIS WITH CHOLELITHIASIS, THICKENED GALLBLADDER WALL,  AND TENDER GALLBLADDER.     Cancer Staging (if applicable)  Cancer Patient: __ yes __no __unknown__N/A; If yes, clinical stage T:__ N:__M:__, stage group or __N/A      Impression: Calculus of gallbladder without obstruction or cholecystitis       Plan: CHOLECYSTECTOMY LAPAROSCOPIC       ANSELMO Farfan   3/15/2024   11:37 EDT  "

## 2024-03-15 NOTE — ANESTHESIA POSTPROCEDURE EVALUATION
Patient: Zofia Ortiz    Procedure Summary       Date: 03/15/24 Room / Location:  [facility] / [facility]    Anesthesia Start: 1205 Anesthesia Stop: 1337    Procedure: CHOLECYSTECTOMY LAPAROSCOPIC (Abdomen) Diagnosis:     Surgeons: Shimon Rebollar MD Provider: J Carlos Kolb MD    Anesthesia Type: general ASA Status: 2            Anesthesia Type: general    Vitals  Vitals Value Taken Time   /77 03/15/24 1337   Temp     Pulse 66 03/15/24 1339   Resp     SpO2 100 % 03/15/24 1339   Vitals shown include unfiled device data.        Post Anesthesia Care and Evaluation    Patient location during evaluation: PACU  Patient participation: complete - patient participated  Level of consciousness: responsive to verbal stimuli  Pain score: 0  Pain management: adequate    Airway patency: patent  Anesthetic complications: No anesthetic complications  PONV Status: none  Cardiovascular status: hemodynamically stable and acceptable  Respiratory status: nonlabored ventilation, acceptable, nasal cannula and spontaneous ventilation  Hydration status: acceptable    Comments: VSS  No anesthesia care post op

## 2024-07-26 ENCOUNTER — HOSPITAL ENCOUNTER (EMERGENCY)
Facility: HOSPITAL | Age: 44
Discharge: HOME | End: 2024-07-26
Attending: EMERGENCY MEDICINE
Payer: COMMERCIAL

## 2024-07-26 ENCOUNTER — APPOINTMENT (OUTPATIENT)
Dept: CT IMAGING | Facility: HOSPITAL | Age: 44
End: 2024-07-26
Payer: COMMERCIAL

## 2024-07-26 VITALS
OXYGEN SATURATION: 98 % | SYSTOLIC BLOOD PRESSURE: 124 MMHG | HEIGHT: 66 IN | BODY MASS INDEX: 23.3 KG/M2 | DIASTOLIC BLOOD PRESSURE: 77 MMHG | HEART RATE: 76 BPM | WEIGHT: 145 LBS | TEMPERATURE: 98.7 F | RESPIRATION RATE: 20 BRPM

## 2024-07-26 DIAGNOSIS — L03.031 CELLULITIS OF RIGHT TOE: ICD-10-CM

## 2024-07-26 DIAGNOSIS — S02.2XXA CLOSED FRACTURE OF NASAL BONE, INITIAL ENCOUNTER: Primary | ICD-10-CM

## 2024-07-26 DIAGNOSIS — S00.83XA FACIAL HEMATOMA, INITIAL ENCOUNTER: ICD-10-CM

## 2024-07-26 DIAGNOSIS — S09.90XA INJURY OF HEAD, INITIAL ENCOUNTER: ICD-10-CM

## 2024-07-26 PROCEDURE — 99284 EMERGENCY DEPT VISIT MOD MDM: CPT

## 2024-07-26 PROCEDURE — 70486 CT MAXILLOFACIAL W/O DYE: CPT

## 2024-07-26 PROCEDURE — 70450 CT HEAD/BRAIN W/O DYE: CPT
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
## 2025-01-03 ENCOUNTER — APPOINTMENT (OUTPATIENT)
Dept: GENERAL RADIOLOGY | Facility: HOSPITAL | Age: 45
End: 2025-01-03
Payer: OTHER MISCELLANEOUS

## 2025-01-03 ENCOUNTER — APPOINTMENT (OUTPATIENT)
Dept: CT IMAGING | Facility: HOSPITAL | Age: 45
End: 2025-01-03
Payer: OTHER MISCELLANEOUS

## 2025-01-03 ENCOUNTER — HOSPITAL ENCOUNTER (EMERGENCY)
Facility: HOSPITAL | Age: 45
Discharge: HOME | End: 2025-01-03
Attending: EMERGENCY MEDICINE
Payer: OTHER MISCELLANEOUS

## 2025-01-03 VITALS
TEMPERATURE: 97.7 F | RESPIRATION RATE: 16 BRPM | DIASTOLIC BLOOD PRESSURE: 102 MMHG | SYSTOLIC BLOOD PRESSURE: 133 MMHG | BODY MASS INDEX: 22.5 KG/M2 | HEIGHT: 66 IN | HEART RATE: 86 BPM | OXYGEN SATURATION: 94 % | WEIGHT: 140 LBS

## 2025-01-03 DIAGNOSIS — S40.021A ARM CONTUSION, RIGHT, INITIAL ENCOUNTER: ICD-10-CM

## 2025-01-03 DIAGNOSIS — R47.81 SLURRED SPEECH: ICD-10-CM

## 2025-01-03 DIAGNOSIS — R41.82 ALTERED MENTAL STATUS, UNSPECIFIED ALTERED MENTAL STATUS TYPE: ICD-10-CM
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
## 2025-01-03 LAB
ALBUMIN SERPL-MCNC: 4.4 G/DL (ref 3.5–5.2)
ALBUMIN/GLOB SERPL: 1.4 G/DL
ALP SERPL-CCNC: 79 U/L (ref 39–117)
ALT SERPL W P-5'-P-CCNC: 6 U/L (ref 1–33)
ANION GAP SERPL CALCULATED.3IONS-SCNC: 11 MMOL/L (ref 5–15)
AST SERPL-CCNC: 14 U/L (ref 1–32)
BASOPHILS # BLD AUTO: 0.08 10*3/MM3 (ref 0–0.2)
BASOPHILS NFR BLD AUTO: 0.9 % (ref 0–1.5)
BILIRUB SERPL-MCNC: 0.4 MG/DL (ref 0–1.2)
BUN SERPL-MCNC: 9 MG/DL (ref 6–20)
BUN/CREAT SERPL: 11.5 (ref 7–25)
CALCIUM SPEC-SCNC: 9.8 MG/DL (ref 8.6–10.5)
CHLORIDE SERPL-SCNC: 100 MMOL/L (ref 98–107)
CO2 SERPL-SCNC: 28 MMOL/L (ref 22–29)
CREAT SERPL-MCNC: 0.78 MG/DL (ref 0.57–1)
DEPRECATED RDW RBC AUTO: 43.8 FL (ref 37–54)
EGFRCR SERPLBLD CKD-EPI 2021: 96.2 ML/MIN/1.73
EOSINOPHIL # BLD AUTO: 0.19 10*3/MM3 (ref 0–0.4)
EOSINOPHIL NFR BLD AUTO: 2.3 % (ref 0.3–6.2)
ERYTHROCYTE [DISTWIDTH] IN BLOOD BY AUTOMATED COUNT: 13.1 % (ref 12.3–15.4)
ETHANOL BLD-MCNC: <10 MG/DL (ref 0–10)
GLOBULIN UR ELPH-MCNC: 3.2 GM/DL
GLUCOSE SERPL-MCNC: 149 MG/DL (ref 65–99)
HCT VFR BLD AUTO: 47.5 % (ref 34–46.6)
HGB BLD-MCNC: 15.9 G/DL (ref 12–15.9)
HOLD SPECIMEN: NORMAL
IMM GRANULOCYTES # BLD AUTO: 0.03 10*3/MM3 (ref 0–0.05)
IMM GRANULOCYTES NFR BLD AUTO: 0.4 % (ref 0–0.5)
LIPASE SERPL-CCNC: 38 U/L (ref 13–60)
LYMPHOCYTES # BLD AUTO: 1.66 10*3/MM3 (ref 0.7–3.1)
LYMPHOCYTES NFR BLD AUTO: 19.7 % (ref 19.6–45.3)
MCH RBC QN AUTO: 30.8 PG (ref 26.6–33)
MCHC RBC AUTO-ENTMCNC: 33.5 G/DL (ref 31.5–35.7)
MCV RBC AUTO: 91.9 FL (ref 79–97)
MONOCYTES # BLD AUTO: 0.61 10*3/MM3 (ref 0.1–0.9)
MONOCYTES NFR BLD AUTO: 7.2 % (ref 5–12)
NEUTROPHILS NFR BLD AUTO: 5.86 10*3/MM3 (ref 1.7–7)
NEUTROPHILS NFR BLD AUTO: 69.5 % (ref 42.7–76)
NRBC BLD AUTO-RTO: 0 /100 WBC (ref 0–0.2)
PLATELET # BLD AUTO: 423 10*3/MM3 (ref 140–450)
PMV BLD AUTO: 8.5 FL (ref 6–12)
POTASSIUM SERPL-SCNC: 3.5 MMOL/L (ref 3.5–5.2)
PROT SERPL-MCNC: 7.6 G/DL (ref 6–8.5)
RBC # BLD AUTO: 5.17 10*6/MM3 (ref 3.77–5.28)
SODIUM SERPL-SCNC: 139 MMOL/L (ref 136–145)
TSH SERPL DL<=0.05 MIU/L-ACNC: 3.03 UIU/ML (ref 0.27–4.2)
WBC NRBC COR # BLD AUTO: 8.43 10*3/MM3 (ref 3.4–10.8)
WHOLE BLOOD HOLD COAG: NORMAL
WHOLE BLOOD HOLD SPECIMEN: NORMAL

## 2025-01-03 PROCEDURE — 82077 ASSAY SPEC XCP UR&BREATH IA: CPT | Performed by: EMERGENCY MEDICINE

## 2025-01-03 PROCEDURE — 70450 CT HEAD/BRAIN W/O DYE: CPT

## 2025-01-03 PROCEDURE — 25010000002 KETOROLAC TROMETHAMINE PER 15 MG: Performed by: EMERGENCY MEDICINE

## 2025-01-03 PROCEDURE — 83690 ASSAY OF LIPASE: CPT | Performed by: EMERGENCY MEDICINE

## 2025-01-03 PROCEDURE — 96374 THER/PROPH/DIAG INJ IV PUSH: CPT

## 2025-01-03 PROCEDURE — 80307 DRUG TEST PRSMV CHEM ANLYZR: CPT | Performed by: EMERGENCY MEDICINE

## 2025-01-03 PROCEDURE — 36415 COLL VENOUS BLD VENIPUNCTURE: CPT

## 2025-01-03 PROCEDURE — 80053 COMPREHEN METABOLIC PANEL: CPT | Performed by: EMERGENCY MEDICINE

## 2025-01-03 PROCEDURE — 73030 X-RAY EXAM OF SHOULDER: CPT

## 2025-01-03 PROCEDURE — 73080 X-RAY EXAM OF ELBOW: CPT

## 2025-01-03 PROCEDURE — 99284 EMERGENCY DEPT VISIT MOD MDM: CPT

## 2025-01-03 PROCEDURE — 84443 ASSAY THYROID STIM HORMONE: CPT | Performed by: EMERGENCY MEDICINE

## 2025-01-03 PROCEDURE — 72125 CT NECK SPINE W/O DYE: CPT

## 2025-01-03 PROCEDURE — 85025 COMPLETE CBC W/AUTO DIFF WBC: CPT | Performed by: EMERGENCY MEDICINE

## 2025-01-03 RX ORDER — ACETAMINOPHEN 500 MG
1000 TABLET ORAL ONCE
Status: DISCONTINUED | OUTPATIENT
Start: 2025-01-03 | End: 2025-01-03 | Stop reason: HOSPADM

## 2025-01-03 RX ORDER — KETOROLAC TROMETHAMINE 10 MG/1
10 TABLET, FILM COATED ORAL EVERY 6 HOURS PRN
Qty: 20 TABLET | Refills: 0 | Status: SHIPPED | OUTPATIENT
Start: 2025-01-03

## 2025-01-03 RX ORDER — KETOROLAC TROMETHAMINE 15 MG/ML
15 INJECTION, SOLUTION INTRAMUSCULAR; INTRAVENOUS ONCE
Status: COMPLETED | OUTPATIENT
Start: 2025-01-03 | End: 2025-01-03

## 2025-01-03 RX ORDER — ACETAMINOPHEN 500 MG
1000 TABLET ORAL EVERY 6 HOURS PRN
Qty: 30 TABLET | Refills: 0 | Status: SHIPPED | OUTPATIENT
Start: 2025-01-03

## 2025-01-03 RX ADMIN — KETOROLAC TROMETHAMINE 15 MG: 15 INJECTION, SOLUTION INTRAMUSCULAR; INTRAVENOUS at 14:09
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
## 2025-01-13 LAB
BZE SPEC-MCNC: >1500 NG/ML
COCAINE SPEC QL CFM: POSITIVE
COCAINE SPEC-MCNC: NEGATIVE NG/ML

## 2025-01-15 LAB
11OH-THC SPEC-MCNC: NEGATIVE NG/ML
AMPHETAMINES SERPL QL SCN: NEGATIVE NG/ML
BARBITURATES SERPL QL SCN: NEGATIVE UG/ML
BENZODIAZ SERPL QL SCN: NEGATIVE NG/ML
CANNABIDIOL SERPLBLD CFM-MCNC: NEGATIVE NG/ML
CANNABINOIDS SERPL QL SCN: ABNORMAL NG/ML
CANNABINOIDS SPEC QL CFM: POSITIVE
CARBOXYTHC SPEC-MCNC: 2.6 NG/ML
COCAINE+BZE SERPL QL SCN: ABNORMAL NG/ML
METHADONE SERPL QL SCN: NEGATIVE NG/ML
OPIATES SERPL QL SCN: NEGATIVE NG/ML
OXYCODONE+OXYMORPHONE SERPLBLD QL SCN: NEGATIVE NG/ML
PCP SERPL QL SCN: NEGATIVE NG/ML
PROPOXYPH SERPL QL SCN: NEGATIVE NG/ML
THC SERPLBLD CFM-MCNC: NEGATIVE NG/ML

## 2025-07-08 ENCOUNTER — PATIENT ROUNDING (BHMG ONLY) (OUTPATIENT)
Dept: URGENT CARE | Facility: CLINIC | Age: 45
End: 2025-07-08
Payer: OTHER MISCELLANEOUS

## 2025-07-16 ENCOUNTER — APPOINTMENT (OUTPATIENT)
Dept: GENERAL RADIOLOGY | Facility: HOSPITAL | Age: 45
End: 2025-07-16
Payer: COMMERCIAL

## 2025-07-16 ENCOUNTER — TELEPHONE (OUTPATIENT)
Dept: CARDIOLOGY | Facility: HOSPITAL | Age: 45
End: 2025-07-16
Payer: COMMERCIAL

## 2025-07-16 ENCOUNTER — HOSPITAL ENCOUNTER (EMERGENCY)
Facility: HOSPITAL | Age: 45
Discharge: HOME | End: 2025-07-16
Attending: EMERGENCY MEDICINE | Admitting: EMERGENCY MEDICINE
Payer: COMMERCIAL

## 2025-07-16 VITALS
DIASTOLIC BLOOD PRESSURE: 97 MMHG | TEMPERATURE: 98.2 F | OXYGEN SATURATION: 94 % | WEIGHT: 165 LBS | SYSTOLIC BLOOD PRESSURE: 135 MMHG | HEART RATE: 63 BPM | HEIGHT: 66 IN | BODY MASS INDEX: 26.52 KG/M2 | RESPIRATION RATE: 18 BRPM

## 2025-07-16 DIAGNOSIS — R07.9 CHEST PAIN, UNSPECIFIED TYPE: Primary | ICD-10-CM

## 2025-07-16 LAB
ALBUMIN SERPL-MCNC: 3.9 G/DL (ref 3.5–5.2)
ALBUMIN/GLOB SERPL: 1.6 G/DL
ALP SERPL-CCNC: 59 U/L (ref 39–117)
ALT SERPL W P-5'-P-CCNC: 42 U/L (ref 1–33)
ANION GAP SERPL CALCULATED.3IONS-SCNC: 10.2 MMOL/L (ref 5–15)
AST SERPL-CCNC: 93 U/L (ref 1–32)
BASOPHILS # BLD AUTO: 0.08 10*3/MM3 (ref 0–0.2)
BASOPHILS NFR BLD AUTO: 1.3 % (ref 0–1.5)
BILIRUB SERPL-MCNC: 0.2 MG/DL (ref 0–1.2)
BUN SERPL-MCNC: 16.7 MG/DL (ref 6–20)
BUN/CREAT SERPL: 20.1 (ref 7–25)
CALCIUM SPEC-SCNC: 8.7 MG/DL (ref 8.6–10.5)
CHLORIDE SERPL-SCNC: 107 MMOL/L (ref 98–107)
CO2 SERPL-SCNC: 25.8 MMOL/L (ref 22–29)
CREAT SERPL-MCNC: 0.83 MG/DL (ref 0.57–1)
DEPRECATED RDW RBC AUTO: 42 FL (ref 37–54)
EGFRCR SERPLBLD CKD-EPI 2021: 88.7 ML/MIN/1.73
EOSINOPHIL # BLD AUTO: 0.52 10*3/MM3 (ref 0–0.4)
EOSINOPHIL NFR BLD AUTO: 8.3 % (ref 0.3–6.2)
ERYTHROCYTE [DISTWIDTH] IN BLOOD BY AUTOMATED COUNT: 12.9 % (ref 12.3–15.4)
GEN 5 1HR TROPONIN T REFLEX: <6 NG/L
GLOBULIN UR ELPH-MCNC: 2.4 GM/DL
GLUCOSE SERPL-MCNC: 152 MG/DL (ref 65–99)
HCT VFR BLD AUTO: 39.3 % (ref 34–46.6)
HETEROPH AB SER QL LA: NEGATIVE
HGB BLD-MCNC: 12.7 G/DL (ref 12–15.9)
HOLD SPECIMEN: NORMAL
IMM GRANULOCYTES # BLD AUTO: 0.02 10*3/MM3 (ref 0–0.05)
IMM GRANULOCYTES NFR BLD AUTO: 0.3 % (ref 0–0.5)
LIPASE SERPL-CCNC: 22 U/L (ref 13–60)
LYMPHOCYTES # BLD AUTO: 2.08 10*3/MM3 (ref 0.7–3.1)
LYMPHOCYTES NFR BLD AUTO: 33.1 % (ref 19.6–45.3)
MCH RBC QN AUTO: 28.7 PG (ref 26.6–33)
MCHC RBC AUTO-ENTMCNC: 32.3 G/DL (ref 31.5–35.7)
MCV RBC AUTO: 88.9 FL (ref 79–97)
MONOCYTES # BLD AUTO: 0.34 10*3/MM3 (ref 0.1–0.9)
MONOCYTES NFR BLD AUTO: 5.4 % (ref 5–12)
NEUTROPHILS NFR BLD AUTO: 3.24 10*3/MM3 (ref 1.7–7)
NEUTROPHILS NFR BLD AUTO: 51.6 % (ref 42.7–76)
NRBC BLD AUTO-RTO: 0 /100 WBC (ref 0–0.2)
NT-PROBNP SERPL-MCNC: 288 PG/ML (ref 0–450)
PLATELET # BLD AUTO: 268 10*3/MM3 (ref 140–450)
PMV BLD AUTO: 8.9 FL (ref 6–12)
POTASSIUM SERPL-SCNC: 3.8 MMOL/L (ref 3.5–5.2)
PROT SERPL-MCNC: 6.3 G/DL (ref 6–8.5)
QT INTERVAL: 424 MS
QT INTERVAL: 436 MS
QTC INTERVAL: 428 MS
QTC INTERVAL: 430 MS
RBC # BLD AUTO: 4.42 10*6/MM3 (ref 3.77–5.28)
SODIUM SERPL-SCNC: 143 MMOL/L (ref 136–145)
TROPONIN T NUMERIC DELTA: NORMAL
TROPONIN T SERPL HS-MCNC: <6 NG/L
WBC NRBC COR # BLD AUTO: 6.28 10*3/MM3 (ref 3.4–10.8)
WHOLE BLOOD HOLD COAG: NORMAL
WHOLE BLOOD HOLD SPECIMEN: NORMAL

## 2025-07-16 PROCEDURE — 85025 COMPLETE CBC W/AUTO DIFF WBC: CPT | Performed by: EMERGENCY MEDICINE

## 2025-07-16 PROCEDURE — 83690 ASSAY OF LIPASE: CPT | Performed by: EMERGENCY MEDICINE

## 2025-07-16 PROCEDURE — 99284 EMERGENCY DEPT VISIT MOD MDM: CPT

## 2025-07-16 PROCEDURE — 71045 X-RAY EXAM CHEST 1 VIEW: CPT

## 2025-07-16 PROCEDURE — 36415 COLL VENOUS BLD VENIPUNCTURE: CPT

## 2025-07-16 PROCEDURE — 83880 ASSAY OF NATRIURETIC PEPTIDE: CPT | Performed by: EMERGENCY MEDICINE

## 2025-07-16 PROCEDURE — 86308 HETEROPHILE ANTIBODY SCREEN: CPT | Performed by: EMERGENCY MEDICINE

## 2025-07-16 PROCEDURE — 93005 ELECTROCARDIOGRAM TRACING: CPT | Performed by: EMERGENCY MEDICINE

## 2025-07-16 PROCEDURE — 84484 ASSAY OF TROPONIN QUANT: CPT | Performed by: EMERGENCY MEDICINE

## 2025-07-16 PROCEDURE — 80053 COMPREHEN METABOLIC PANEL: CPT | Performed by: EMERGENCY MEDICINE

## 2025-07-16 RX ORDER — ASPIRIN 81 MG/1
324 TABLET, CHEWABLE ORAL ONCE
Status: DISCONTINUED | OUTPATIENT
Start: 2025-07-16 | End: 2025-07-16 | Stop reason: HOSPADM

## 2025-07-16 RX ORDER — SODIUM CHLORIDE 0.9 % (FLUSH) 0.9 %
10 SYRINGE (ML) INJECTION AS NEEDED
Status: DISCONTINUED | OUTPATIENT
Start: 2025-07-16 | End: 2025-07-16 | Stop reason: HOSPADM

## 2025-07-16 NOTE — CASE MANAGEMENT/SOCIAL WORK
Continued Stay Note   [facility]     Patient Name: Zofia Ortiz  MRN: [id number]  Today's Date: 7/16/2025    Admit Date: 7/16/2025    Plan: Lyft   Discharge Plan       Row Name 07/16/25 1443       Plan    Plan Lyft    Plan Comments MSW contacted by RN regarding transportation assistance. Pt provided drop off address; MSW scheduled Lyft and reported details to Pt.                   Discharge Codes    No documentation.                       STEPHANIE Edwards

## 2025-07-16 NOTE — ED PROVIDER NOTES
[facility]    EMERGENCY DEPARTMENT ENCOUNTER      Pt Name: Zofia Ortiz  MRN: [id number]  YOB: 1980  Date of evaluation: 2025  Provider: Rober Avendaño DO    CHIEF COMPLAINT       Chief Complaint   Patient presents with    Chest Pain         HISTORY OF PRESENT ILLNESS  (Location/Symptom, Timing/Onset, Context/Setting, Quality, Duration, Modifying Factors, Severity.)   Zofia Ortiz is a 45 y.o. female who presents to the emergency department for evaluation of chest pain which she notes started around 930 this morning after she awoke.  The patient notes she felt some pressure, mild nausea which was waxing waning in severity.  EMS notes the patient did have sinus without ischemic changes, some intermittent bradycardia in the 40s to 50s.  The patient states she has been clean for the last 6 months, history of alcohol abuse, does have a history of heart burn and acid reflux.  Patient states she has not had any prior known cardiac history, no prior cardiac catheterization or stent placement.  She has had recent dental work, dental implant and currently on antibiotics for dental infection.  Her roommate also has mono.  She denies any other recent illness, fever chills, no fall, injury or trauma.  She has no other acute systemic complaints at this time.    Nursing notes were reviewed.      PAST MEDICAL HISTORY     Past Medical History:   Diagnosis Date    Abnormal glucose     Alcohol abuse     Body piercing     EARS ONLY    Dyspepsia     rare heartburn sx's, serum h. pyl neg    Dyspnea on exertion     Elevated BP     156/106 @ Intake, denies hx HTN.      Elevated transaminase level     Fatigue     Former smoker     Vapes now    GERD (gastroesophageal reflux disease)     Morbid obesity      (normal spontaneous vaginal delivery)     x 1 w/o complic    Psoriasis     Sleep apnea     newly dx, but suspects she has always had it    Sleep apnea, obstructive 2018    recent sleep stuady change  to bipap  no b-pap or c-pap    Tattoos     NECK, LEFT THIGH, RIGHT ANKLE    Vitamin D deficiency          SURGICAL HISTORY       Past Surgical History:   Procedure Laterality Date    ABDOMINOPLASTY N/A 2019    Procedure: ABDOMINOPLASTY;  Surgeon: Abisai Thurman MD;  Location:  [facility];  Service: Plastics     SECTION WITH TUBAL  2007    CHOLECYSTECTOMY N/A 3/15/2024    Procedure: CHOLECYSTECTOMY LAPAROSCOPIC;  Surgeon: Shimon Rebollar MD;  Location:  [facility];  Service: General;  Laterality: N/A;    CYSTECTOMY      ORAL    CYSTOSCOPY RETROGRADE PYELOGRAM Bilateral 2023    Procedure: CYSTOSCOPY, BILATERAL RETROGRADE PYELOGRAM;  Surgeon: Antonio Canales MD;  Location:  [facility];  Service: Urology;  Laterality: Bilateral;    ENDOSCOPY N/A 2018    Procedure: ESOPHAGOGASTRODUODENOSCOPY;  Surgeon: Glen Blackmon MD;  Location:  [facility];  Service: Bariatric    GASTRIC SLEEVE LAPAROSCOPIC N/A 2018    Procedure: GASTRIC SLEEVE LAPAROSCOPIC;  Surgeon: Glen Blackmon MD;  Location:  [facility];  Service: Bariatric    KNEE ARTHROSCOPY Right 2013    MASTOPEXY Bilateral 2019    Procedure: BREAST LIFT BILATERAL;  Surgeon: Abisai Thurman MD;  Location:  [facility];  Service: Plastics    TOTAL LAPAROSCOPIC HYSTERECTOMY SALPINGECTOMY N/A 2023    Procedure: TOTAL LAPAROSCOPIC HYSTERECTOMY BILATERAL SALPINGECTOMY WITH DAVINCI ROBOT;  Surgeon: Janine Mckay MD;  Location:  [facility];  Service: Robotics - DaVinci;  Laterality: N/A;    WISDOM TOOTH EXTRACTION      ALL FOUR         CURRENT MEDICATIONS       Current Facility-Administered Medications:     aspirin chewable tablet 324 mg, 324 mg, Oral, Once, Kenya Vo MD    sodium chloride 0.9 % flush 10 mL, 10 mL, Intravenous, PRN, Kenya Vo MD    Current Outpatient Medications:     amoxicillin-clavulanate (AUGMENTIN) 875-125 MG per tablet, Take 1 tablet by mouth 2 (Two) Times a Day., Disp: 20 tablet,  Rfl: 0    buPROPion XL (FORFIVO XL) 450 MG 24 hr tablet, Take 1 tablet by mouth Daily., Disp: , Rfl:     cloNIDine (CATAPRES) 0.1 MG tablet, Take 1 tablet by mouth 3 (Three) Times a Day As Needed. for anxiety, Disp: , Rfl:     fluticasone (FLONASE) 50 MCG/ACT nasal spray, Administer 2 sprays into the nostril(s) as directed by provider Daily., Disp: 16 g, Rfl: 0    hydrOXYzine pamoate (VISTARIL) 50 MG capsule, , Disp: , Rfl:     Melatonin Extra Strength 10 MG tablet, take 1 tablet by mouth every night at bedtime for 30 days, Disp: , Rfl:     omeprazole (priLOSEC) 20 MG capsule, , Disp: , Rfl:     QUEtiapine (SEROquel) 25 MG tablet, Take 2 tablets by mouth every night at bedtime., Disp: , Rfl:     Sennosides-Docusate Sodium (SENOKOT S PO), Senokot, Disp: , Rfl:     traZODone (DESYREL) 150 MG tablet, Take 1 tablet by mouth every night at bedtime., Disp: , Rfl:     ALLERGIES     Patient has no known allergies.    FAMILY HISTORY       Family History   Problem Relation Age of Onset    Hypertension Mother     No Known Problems Father     No Known Problems Sister     Obesity Maternal Grandmother     Hypertension Maternal Grandmother     Diabetes Maternal Grandmother     Melanoma Maternal Grandfather           SOCIAL HISTORY       Social History     Socioeconomic History    Marital status:      Spouse name: Bruce Ortiz    Number of children: 2    Years of education: Bachelor's   Tobacco Use    Smoking status: Former     Current packs/day: 0.00     Average packs/day: 0.5 packs/day for 8.0 years (4.0 ttl pk-yrs)     Types: Cigarettes     Start date:      Quit date: 2013     Years since quittin.5    Smokeless tobacco: Never   Vaping Use    Vaping status: Every Day    Substances: Nicotine, Flavoring    Devices: Disposable, 1 per month   Substance and Sexual Activity    Alcohol use: Not Currently     Comment: Occasional    Drug use: Not Currently     Types: Marijuana, Cocaine(coke), Heroin, Amphetamines,  Methamphetamines     Comment: last use 2/16/2024    Sexual activity: Defer     Partners: Male     Comment:           PHYSICAL EXAM       Vitals:    07/16/25 1230 07/16/25 1300 07/16/25 1330 07/16/25 1400   BP: 137/96 141/88 136/90 152/93   Pulse: 58 66 61 64   Resp:       Temp:       TempSrc:       SpO2: 93% 92% 92% 93%   Weight:       Height:           Physical Exam  General : Patient is awake, alert, oriented, in no acute distress, nontoxic appearing  HEENT: Pupils are equally round, EOMI, conjunctivae clear  Neck: Neck is supple, full range of motion, trachea midline  Cardiac: Heart regular rate, rhythm, no murmurs, rubs, or gallops  Lungs: Lungs are clear to auscultation, there is no wheezing, rhonchi, or rales. There is no use of accessory muscles  Chest wall: There is no tenderness to palpation over the chest wall or over ribs  Abdomen: Abdomen is soft, nontender, nondistended. There are no firm or pulsatile masses, no rebound rigidity or guarding  Musculoskeletal: 5 out of 5 strength in all 4 extremities.  No focal muscle deficits are appreciated  Neuro: Motor intact, sensory intact, level of consciousness is normal, GCS 15  Dermatology: Skin is warm and dry  Psych: Mentation is grossly normal, cognition is grossly normal. Affect is appropriate      DIAGNOSTIC RESULTS     EKG:  All EKGs are interpreted by the Emergency Department Physician who either signs or Co-signs this chart in the absence of a cardiologist.    ECG 12 Lead Chest Pain   Final Result   Test Reason : chest pain   Blood Pressure :   */*   mmHG   Vent. Rate :  58 BPM     Atrial Rate :  58 BPM      P-R Int : 200 ms          QRS Dur :  88 ms       QT Int : 436 ms       P-R-T Axes :  43  11  34 degrees     QTcB Int : 428 ms      Sinus bradycardia   Otherwise normal ECG   When compared with ECG of 16-Jul-2025 10:37, (Unconfirmed)   No significant change was found   Confirmed by GURDEEP WOODARD MD ([id number]) on 7/16/2025 11:45:04 AM       Referred By: ed md           Confirmed By: GURDEEP WOODARD MD      ECG 12 Lead Chest Pain   Preliminary Result   Test Reason : Chest Pain   Blood Pressure :   */*   mmHG   Vent. Rate :  62 BPM     Atrial Rate :  62 BPM      P-R Int : 218 ms          QRS Dur :  88 ms       QT Int : 424 ms       P-R-T Axes :  42  -3  19 degrees     QTcB Int : 430 ms      Sinus rhythm with 1st degree AV block   Minimal voltage criteria for LVH, may be normal variant   Borderline ECG   When compared with ECG of 16-Feb-2024 05:24,   ME interval has increased   QT has shortened      Referred By: ED MD           Confirmed By:       Telemetry Scan   Final Result          RADIOLOGY:     [x] Radiologist's Report Reviewed:  XR Chest 1 View   Final Result   Impression:   No active cardiopulmonary disease         Electronically Signed: J Carlos Neff     7/16/2025 10:47 AM EDT     Workstation ID: [id number]          I ordered and independently reviewed the above noted radiographic studies.      I viewed images of chest x-ray which showed no acute cardiopulmonary process.  Per my independent interpretation.    See radiologist's dictation for official interpretation.        LABS:    I have reviewed and interpreted all of the currently available lab results from this visit (if applicable):  Results for orders placed or performed during the hospital encounter of 07/16/25   High Sensitivity Troponin T    Collection Time: 07/16/25 10:31 AM    Specimen: Blood   Result Value Ref Range    HS Troponin T <6 <14 ng/L   Comprehensive Metabolic Panel    Collection Time: 07/16/25 10:31 AM    Specimen: Blood   Result Value Ref Range    Glucose 152 (H) 65 - 99 mg/dL    BUN 16.7 6.0 - 20.0 mg/dL    Creatinine 0.83 0.57 - 1.00 mg/dL    Sodium 143 136 - 145 mmol/L    Potassium 3.8 3.5 - 5.2 mmol/L    Chloride 107 98 - 107 mmol/L    CO2 25.8 22.0 - 29.0 mmol/L    Calcium 8.7 8.6 - 10.5 mg/dL    Total Protein 6.3 6.0 - 8.5 g/dL    Albumin 3.9 3.5 - 5.2 g/dL    ALT  (SGPT) 42 (H) 1 - 33 U/L    AST (SGOT) 93 (H) 1 - 32 U/L    Alkaline Phosphatase 59 39 - 117 U/L    Total Bilirubin 0.2 0.0 - 1.2 mg/dL    Globulin 2.4 gm/dL    A/G Ratio 1.6 g/dL    BUN/Creatinine Ratio 20.1 7.0 - 25.0    Anion Gap 10.2 5.0 - 15.0 mmol/L    eGFR 88.7 >60.0 mL/min/1.73   Lipase    Collection Time: 07/16/25 10:31 AM    Specimen: Blood   Result Value Ref Range    Lipase 22 13 - 60 U/L   BNP    Collection Time: 07/16/25 10:31 AM    Specimen: Blood   Result Value Ref Range    proBNP 288.0 0.0 - 450.0 pg/mL   CBC Auto Differential    Collection Time: 07/16/25 10:31 AM    Specimen: Blood   Result Value Ref Range    WBC 6.28 3.40 - 10.80 10*3/mm3    RBC 4.42 3.77 - 5.28 10*6/mm3    Hemoglobin 12.7 12.0 - 15.9 g/dL    Hematocrit 39.3 34.0 - 46.6 %    MCV 88.9 79.0 - 97.0 fL    MCH 28.7 26.6 - 33.0 pg    MCHC 32.3 31.5 - 35.7 g/dL    RDW 12.9 12.3 - 15.4 %    RDW-SD 42.0 37.0 - 54.0 fl    MPV 8.9 6.0 - 12.0 fL    Platelets 268 140 - 450 10*3/mm3    Neutrophil % 51.6 42.7 - 76.0 %    Lymphocyte % 33.1 19.6 - 45.3 %    Monocyte % 5.4 5.0 - 12.0 %    Eosinophil % 8.3 (H) 0.3 - 6.2 %    Basophil % 1.3 0.0 - 1.5 %    Immature Grans % 0.3 0.0 - 0.5 %    Neutrophils, Absolute 3.24 1.70 - 7.00 10*3/mm3    Lymphocytes, Absolute 2.08 0.70 - 3.10 10*3/mm3    Monocytes, Absolute 0.34 0.10 - 0.90 10*3/mm3    Eosinophils, Absolute 0.52 (H) 0.00 - 0.40 10*3/mm3    Basophils, Absolute 0.08 0.00 - 0.20 10*3/mm3    Immature Grans, Absolute 0.02 0.00 - 0.05 10*3/mm3    nRBC 0.0 0.0 - 0.2 /100 WBC   Mononucleosis Screen    Collection Time: 07/16/25 10:31 AM    Specimen: Blood   Result Value Ref Range    Monospot Negative Negative   Green Top (Gel)    Collection Time: 07/16/25 10:31 AM   Result Value Ref Range    Extra Tube Hold for add-ons.    Lavender Top    Collection Time: 07/16/25 10:31 AM   Result Value Ref Range    Extra Tube hold for add-on    Gold Top - SST    Collection Time: 07/16/25 10:31 AM   Result Value Ref Range     Extra Tube Hold for add-ons.    Gray Top    Collection Time: 07/16/25 10:31 AM   Result Value Ref Range    Extra Tube Hold for add-ons.    Light Blue Top    Collection Time: 07/16/25 10:31 AM   Result Value Ref Range    Extra Tube Hold for add-ons.    ECG 12 Lead Chest Pain    Collection Time: 07/16/25 10:37 AM   Result Value Ref Range    QT Interval 424 ms    QTC Interval 430 ms   High Sensitivity Troponin T 1Hr    Collection Time: 07/16/25 11:41 AM    Specimen: Blood   Result Value Ref Range    HS Troponin T <6 <14 ng/L    Troponin T Numeric Delta     ECG 12 Lead Chest Pain    Collection Time: 07/16/25 11:43 AM   Result Value Ref Range    QT Interval 436 ms    QTC Interval 428 ms        If labs were ordered, I independently reviewed the results and considered them in treating the patient.      EMERGENCY DEPARTMENT COURSE and DIFFERENTIAL DIAGNOSIS/MDM:   Vitals:  AS OF 14:29 EDT    BP - 152/93  HR - 64  TEMP - 98.2 °F (36.8 °C) (Oral)  O2 SATS - 93%      Orders placed during this visit:  Orders Placed This Encounter   Procedures    XR Chest 1 View    [place] Draw    High Sensitivity Troponin T    Comprehensive Metabolic Panel    Lipase    BNP    CBC Auto Differential    Mononucleosis Screen    High Sensitivity Troponin T 1Hr    Ambulatory Referral to [facility]    NPO Diet NPO Type: Strict NPO    Undress & Gown    Continuous Pulse Oximetry    Oxygen Therapy- Nasal Cannula; Titrate 1-6 LPM Per SpO2; 90 - 95%    ECG 12 Lead Chest Pain    ECG 12 Lead ED Triage Standing Order; Chest Pain    Telemetry Scan    Insert Peripheral IV    CBC & Differential    Green Top (Gel)    Lavender Top    Gold Top - SST    Gray Top    Light Blue Top       All labs have been independently reviewed by me.  All radiology studies have been reviewed by me and the radiologist dictating the report.  All EKG's have been independently viewed and interpreted by me.      Discussion below represents my analysis of pertinent  findings related to patient's condition, differential diagnosis, treatment plan and final disposition.    Differential diagnosis:  The differential diagnosis associated with the patient's presentation includes: Angina, coronary spasm, acid reflux, gastritis, gastric ulcer, pancreatitis,    Additional sources  Discussed/ obtained information from independent historians:   [] Spouse  [] Parent  [] Family member  [] Friend  [x] EMS   [] Other:    External (non-ED) record review:   [] Inpatient record:   [] Office record:   [] Outpatient record:   [] Prior Outpatient labs:   [] Prior Outpatient radiology:   [] Primary Care record:   [] Outside ED record:   [] Other:     Patient's care impacted by:   [] Diabetes  [] Hypertension  [] CHF  [] Hyperlipidemia  [] Coronary Artery Disease   [] COPD   [] Cancer   [] Tobacco Abuse   [] Substance Abuse    [] Other:     Care significantly affected by Social Determinants of Health (housing and economic circumstances, unemployment)    [] Yes     [x] No   If yes, Patient's care significantly limited by Social Determinants of Health including:   [] Inadequate housing   [] Low income   [] Alcoholism and drug addiction in family   [] Problems related to primary support group   [] Unemployment   [] Problems related to employment   [] Other Social Determinants of Health:       MEDICATIONS ADMINISTERED IN ED:  Medications   sodium chloride 0.9 % flush 10 mL (has no administration in time range)   aspirin chewable tablet 324 mg (324 mg Oral Not Given 7/16/25 1018)       ED Course as of 07/16/25 1429   Wed Jul 16, 2025   1428 HEART Pathway for Early Discharge in Acute Chest Pain from [email or web address]  on 7/16/2025  ** All calculations should be rechecked by clinician prior to use **    RESULT SUMMARY:  2 points  HEART Pathway Score    Low risk  0.9-1.7% 30-day MACE    Repeat troponin at 3 hours and if negative, discharge home with outpatient follow-up.      INPUTS:  History -> 0 = Slightly  suspicious  EKG -> 0 = Normal  Age -> 1 = 45-64  Risk factors -> 1 = 1-2 risk factors  Initial troponin -> 0 = <=normal limit   [AP]      ED Course User Index  [AP] Rober Avendaño, DO       To 45-year-old female who presents via EMS secondary to concern for chest pain since she awoke early this morning just after 9:00.  She is found to be normal sinus rhythm without acute ischemic changes on her initial EKG, vital signs are stable, she is nontoxic-appearing.  She is actually been doing quite well over the last 6 months, through rehab and therapy has been clean for quite some time.  She has nontoxic-appearing examination on initial assessment.  We did obtain blood work labs and imaging for further evaluation.  Patient's workup reveals normal kidney function liver function, troponins are negative x 2, her chest x-ray is without acute cardiopulmonary process.  Patient was updated on these findings, we discussed low risk for ACS but would still benefit from referral over to heart valve center for chest pain workup moving forward, return to the ED in the meantime with any worsening symptoms or further concerns.    I had a discussion with the patient/family regarding diagnosis, diagnostic results, treatment plan, and medications.  The patient/family indicated understanding of these instructions.  I spent adequate time at the bedside preceding discharge necessary to personally discuss the aftercare instructions, giving patient education, providing explanations of the results of our evaluations/findings, and my decision making to assure that the patient/family understand the plan of care.  Time was allotted to answer questions at that time and throughout the ED course.  Emphasis was placed on timely follow-up after discharge.  I also discussed the potential for the development of an acute emergent condition requiring further evaluation, admission, or even surgical intervention. I discussed that we found nothing  during the visit today indicating the need for further workup, admission, or the presence of an unstable medical condition.  I encouraged the patient to return to the emergency department immediately for ANY concerns, worsening, new complaints, or if symptoms persist and unable to seek follow-up in a timely fashion.  The patient/family expressed understanding and agreement with this plan.  The patient will follow-up with their PCP in 1-2 days for reevaluation.     PROCEDURES:  Procedures    CRITICAL CARE TIME    Total Critical Care time was 0 minutes, excluding separately reportable procedures.   There was a high probability of clinically significant/life threatening deterioration in the patient's condition which required my urgent intervention.      FINAL IMPRESSION      1. Chest pain, unspecified type          DISPOSITION/PLAN     ED Disposition       ED Disposition   Discharge    Condition   Stable    Comment   --               PATIENT REFERRED TO:  Morgan Arguello MD  [address]  [facility] [id number] [phone]    Schedule an appointment as soon as possible for a visit       [facility] CARDIOLOGY  [address]  [place] [address] [phone]  Schedule an appointment as soon as possible for a visit       [facility] EMERGENCY DEPARTMENT  [address] [address] [phone]    If symptoms worsen      DISCHARGE MEDICATIONS:     Medication List        CONTINUE taking these medications      amoxicillin-clavulanate 875-125 MG per tablet  Commonly known as: AUGMENTIN  Take 1 tablet by mouth 2 (Two) Times a Day.     buPROPion  MG 24 hr tablet  Commonly known as: FORFIVO XL     cloNIDine 0.1 MG tablet  Commonly known as: CATAPRES     fluticasone 50 MCG/ACT nasal spray  Commonly known as: FLONASE  Administer 2 sprays into the nostril(s) as directed by provider Daily.     hydrOXYzine pamoate 50 MG  capsule  Commonly known as: VISTARIL     Melatonin Extra Strength 10 MG tablet  Generic drug: Melatonin     omeprazole 20 MG capsule  Commonly known as: priLOSEC     QUEtiapine 25 MG tablet  Commonly known as: SEROquel     SENOKOT S PO     traZODone 150 MG tablet  Commonly known as: DESYREL                  Comment: Please note this report has been produced using speech recognition software.      Rober Avendaño DO  Attending Emergency Physician         Rober Avendaño DO  07/16/25 [id number]

## 2025-07-16 NOTE — TELEPHONE ENCOUNTER
Call X 1. [facility] ER Referred Patient To [facility] 07/16/2025. Called Patient 07/16/2025. Scheduled Patient Next Available Appointment 07/21/2025.

## 2025-07-21 ENCOUNTER — OFFICE VISIT (OUTPATIENT)
Dept: CARDIOLOGY | Facility: HOSPITAL | Age: 45
End: 2025-07-21
Payer: COMMERCIAL

## 2025-07-21 VITALS
WEIGHT: 180.44 LBS | HEART RATE: 85 BPM | BODY MASS INDEX: 29 KG/M2 | SYSTOLIC BLOOD PRESSURE: 122 MMHG | DIASTOLIC BLOOD PRESSURE: 76 MMHG | HEIGHT: 66 IN | OXYGEN SATURATION: 94 % | RESPIRATION RATE: 20 BRPM

## 2025-07-21 DIAGNOSIS — R06.09 DYSPNEA ON EXERTION: ICD-10-CM

## 2025-07-21 DIAGNOSIS — Z87.891 FORMER SMOKER: ICD-10-CM

## 2025-07-21 DIAGNOSIS — R42 DIZZINESS: ICD-10-CM

## 2025-07-21 DIAGNOSIS — R53.83 OTHER FATIGUE: ICD-10-CM

## 2025-07-21 DIAGNOSIS — R07.89 OTHER CHEST PAIN: Primary | ICD-10-CM

## 2025-07-21 PROCEDURE — 99213 OFFICE O/P EST LOW 20 MIN: CPT | Performed by: NURSE PRACTITIONER

## 2025-07-21 NOTE — PROGRESS NOTES
"Chief Complaint  Chest Pain and Establish Care    Subjective    History of Present Illness {CC  Problem List  Visit  Diagnosis   Encounters  Notes  Medications  Labs  Result Review Imaging  Media :23}       History of Present Illness   45-year-old female presents the office today for ongoing evaluation of her chest pain.  She presented to [facility] ED on 7/16/2025 with complaints of chest pain.  She reports the chest pain was sharp in nature located in the center of her chest with some radiation outward.  She reported at the time numbness and tingling in bilateral upper extremities as well as dyspnea on exertion.  She reports over the last week or so she has been experiencing significant fatigue which is new for her.  Patient reports that blood pressure is usually low normal heart rates usually in the 80s.  Does report intermittent occasional pedal edema but denies pedal edema on a regular basis.  No family history of premature CAD in first-degree relative.She has a remote smoker smoking half a pack a day for 8 years.  She is currently vaping.  Previously drank alcohol and use marijuana, cocaine, heroin, amphetamines and methamphetamines.  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  EKGs and troponins within normal limits  Objective     Vital Signs:   Vitals:    07/21/25 0850 07/21/25 0853   BP: 124/85 122/76   BP Location: Left arm Left arm   Patient Position: Standing Sitting   Cuff Size: Adult Adult   Pulse: 97 85   Resp:  20   SpO2: 95% 94%   Weight:  81.8 kg (180 lb 7 oz)   Height:  167.6 cm (65.98\")     Body mass index is 29.14 kg/m².  Physical Exam  Vitals and nursing note reviewed.   Constitutional:       Appearance: Normal appearance.   HENT:      Head: Normocephalic.   Eyes:      Pupils: Pupils are equal, round, and reactive to light.   Cardiovascular:      Rate and Rhythm: Normal rate and regular rhythm.      Pulses: Normal pulses.      Heart " sounds: Normal heart sounds. No murmur heard.  Pulmonary:      Effort: Pulmonary effort is normal.      Breath sounds: Normal breath sounds.   Abdominal:      General: Bowel sounds are normal.      Palpations: Abdomen is soft.   Musculoskeletal:         General: Normal range of motion.      Cervical back: Normal range of motion.      Right lower leg: No edema.      Left lower leg: No edema.   Skin:     General: Skin is warm and dry.      Capillary Refill: Capillary refill takes less than 2 seconds.   Neurological:      Mental Status: She is alert and oriented to person, place, and time.   Psychiatric:         Mood and Affect: Mood normal.         Thought Content: Thought content normal.              Result Review  Data Reviewed:{ Labs  Result Review  Imaging  Med Tab  Media :23}   ECG 12 Lead Chest Pain (07/16/2025 10:37)  ECG 12 Lead Chest Pain (07/16/2025 11:43)  High Sensitivity Troponin T 1Hr (07/16/2025 11:41)  Mononucleosis Screen (07/16/2025 10:31)  BNP (07/16/2025 10:31)  Lipase (07/16/2025 10:31)  Comprehensive Metabolic Panel (07/16/2025 10:31)  POC Rapid Strep A (07/07/2025 16:28)  Covid-19 + Flu A&B AG, Veritor (07/07/2025 16:42)  POC Infectious Mononucleosis (07/07/2025 17:29)  High Sensitivity Troponin T (07/16/2025 10:31)  CBC & Differential (07/16/2025 10:31)         Assessment and Plan {CC Problem List  Visit Diagnosis  ROS  Review (Popup)  Health Maintenance  Quality  BestPractice  Medications  SmartSets  SnapShot Encounters  Media :23}   1. Other chest pain  Cardiac risk factors: remote smoker   - Adult Stress Echo W/ Cont or Stress Agent if Necessary Per Protocol; Future    2. Dyspnea on exertion    - Adult Stress Echo W/ Cont or Stress Agent if Necessary Per Protocol; Future    3. Other fatigue    - Adult Stress Echo W/ Cont or Stress Agent if Necessary Per Protocol; Future    4. Dizziness  Encouraged good hydration   - Adult Stress Echo W/ Cont or Stress Agent if Necessary Per  Protocol; Future    5. Former smoker    - Adult Stress Echo W/ Cont or Stress Agent if Necessary Per Protocol; Future        Follow Up {Instructions Charge Capture  Follow-up Communications :23}   Return if symptoms worsen or fail to improve.    Patient was given instructions and counseling regarding her condition or for health maintenance advice. Please see specific information pulled into the AVS if appropriate.  Patient was instructed to call the [facility] with any questions, concerns, or worsening symptoms.

## 2025-08-11 ENCOUNTER — HOSPITAL ENCOUNTER (OUTPATIENT)
Dept: CARDIOLOGY | Facility: HOSPITAL | Age: 45
Discharge: HOME | End: 2025-08-11
Admitting: NURSE PRACTITIONER
Payer: COMMERCIAL

## 2025-08-11 VITALS
HEART RATE: 81 BPM | HEIGHT: 66 IN | SYSTOLIC BLOOD PRESSURE: 116 MMHG | BODY MASS INDEX: 28.98 KG/M2 | DIASTOLIC BLOOD PRESSURE: 45 MMHG | WEIGHT: 180.34 LBS

## 2025-08-11 DIAGNOSIS — R07.89 OTHER CHEST PAIN: ICD-10-CM

## 2025-08-11 DIAGNOSIS — R53.83 OTHER FATIGUE: ICD-10-CM

## 2025-08-11 DIAGNOSIS — Z87.891 FORMER SMOKER: ICD-10-CM

## 2025-08-11 DIAGNOSIS — R42 DIZZINESS: ICD-10-CM

## 2025-08-11 DIAGNOSIS — R06.09 DYSPNEA ON EXERTION: ICD-10-CM

## 2025-08-11 LAB
BH CV ECHO MEAS - AO ROOT DIAM: 1.9 CM
BH CV ECHO MEAS - EDV(CUBED): 97.3 ML
BH CV ECHO MEAS - EDV(MOD-SP2): 66.1 ML
BH CV ECHO MEAS - EDV(MOD-SP4): 92 ML
BH CV ECHO MEAS - EF(MOD-SP2): 53.9 %
BH CV ECHO MEAS - EF(MOD-SP4): 48.6 %
BH CV ECHO MEAS - ESV(CUBED): 29.8 ML
BH CV ECHO MEAS - ESV(MOD-SP2): 30.5 ML
BH CV ECHO MEAS - ESV(MOD-SP4): 47.3 ML
BH CV ECHO MEAS - FS: 32.6 %
BH CV ECHO MEAS - IVS/LVPW: 1 CM
BH CV ECHO MEAS - IVSD: 1 CM
BH CV ECHO MEAS - LA DIMENSION: 3.5 CM
BH CV ECHO MEAS - LV DIASTOLIC VOL/BSA (35-75): 48.6 CM2
BH CV ECHO MEAS - LV MASS(C)D: 158.8 GRAMS
BH CV ECHO MEAS - LV SYSTOLIC VOL/BSA (12-30): 25 CM2
BH CV ECHO MEAS - LVIDD: 4.6 CM
BH CV ECHO MEAS - LVIDS: 3.1 CM
BH CV ECHO MEAS - LVPWD: 1 CM
BH CV ECHO MEAS - SV(MOD-SP2): 35.6 ML
BH CV ECHO MEAS - SV(MOD-SP4): 44.7 ML
BH CV ECHO MEAS - SVI(MOD-SP2): 18.8 ML/M2
BH CV ECHO MEAS - SVI(MOD-SP4): 23.6 ML/M2
BH CV STRESS BP STAGE 1: NORMAL
BH CV STRESS BP STAGE 2: NORMAL
BH CV STRESS BP STAGE 3: NORMAL
BH CV STRESS DURATION MIN STAGE 1: 3
BH CV STRESS DURATION MIN STAGE 2: 3
BH CV STRESS DURATION MIN STAGE 3: 2
BH CV STRESS DURATION SEC STAGE 1: 0
BH CV STRESS DURATION SEC STAGE 2: 0
BH CV STRESS DURATION SEC STAGE 3: 31
BH CV STRESS ECHO POST STRESS EJECTION FRACTION EF: 75 %
BH CV STRESS GRADE STAGE 1: 10
BH CV STRESS GRADE STAGE 2: 12
BH CV STRESS GRADE STAGE 3: 14
BH CV STRESS HR STAGE 1: 146
BH CV STRESS HR STAGE 2: 157
BH CV STRESS HR STAGE 3: 169
BH CV STRESS METS STAGE 1: 5
BH CV STRESS METS STAGE 2: 7.5
BH CV STRESS METS STAGE 3: 10
BH CV STRESS O2 STAGE 1: 99
BH CV STRESS O2 STAGE 2: 97
BH CV STRESS O2 STAGE 3: 97
BH CV STRESS PROTOCOL 1: NORMAL
BH CV STRESS RECOVERY BP: NORMAL MMHG
BH CV STRESS RECOVERY HR: 98 BPM
BH CV STRESS RECOVERY O2: 99 %
BH CV STRESS SPEED STAGE 1: 1.7
BH CV STRESS SPEED STAGE 2: 2.5
BH CV STRESS SPEED STAGE 3: 3.4
BH CV STRESS STAGE 1: 1
BH CV STRESS STAGE 2: 2
BH CV STRESS STAGE 3: 3
LV EF BIPLANE MOD: 56.2 %
MAXIMAL PREDICTED HEART RATE: 175 BPM
PERCENT MAX PREDICTED HR: 97.71 %
STRESS BASELINE BP: NORMAL MMHG
STRESS BASELINE HR: 81 BPM
STRESS O2 SAT REST: 96 %
STRESS PERCENT HR: 115 %
STRESS POST ESTIMATED WORKLOAD: 10.1 METS
STRESS POST EXERCISE DUR MIN: 8 MIN
STRESS POST EXERCISE DUR SEC: 31 SEC
STRESS POST O2 SAT PEAK: 97 %
STRESS POST PEAK BP: NORMAL MMHG
STRESS POST PEAK HR: 171 BPM
STRESS TARGET HR: 149 BPM

## 2025-08-11 PROCEDURE — 25010000002 SULFUR HEXAFLUORIDE MICROSPH 60.7-25 MG RECONSTITUTED SUSPENSION: Performed by: NURSE PRACTITIONER

## 2025-08-11 PROCEDURE — 93350 STRESS TTE ONLY: CPT | Performed by: INTERNAL MEDICINE

## 2025-08-11 PROCEDURE — 93018 CV STRESS TEST I&R ONLY: CPT | Performed by: INTERNAL MEDICINE

## 2025-08-11 PROCEDURE — 93352 ADMIN ECG CONTRAST AGENT: CPT | Performed by: INTERNAL MEDICINE

## 2025-08-11 PROCEDURE — 93017 CV STRESS TEST TRACING ONLY: CPT

## 2025-08-11 PROCEDURE — 93350 STRESS TTE ONLY: CPT

## 2025-08-11 PROCEDURE — 93016 CV STRESS TEST SUPVJ ONLY: CPT | Performed by: INTERNAL MEDICINE

## 2025-08-11 RX ADMIN — SULFUR HEXAFLUORIDE 5 ML: KIT at 10:01

## (undated) DEVICE — LAPAROSCOPIC SMOKE FILTRATION SYSTEM: Brand: PALL LAPAROSHIELD® PLUS LAPAROSCOPIC SMOKE FILTRATION SYSTEM

## (undated) DEVICE — PETROLATUM DRESSING. FINE MESH GAUZE IMPREGNATED WITH 3% BISMUTH TRIBROMOPHENATE  IN A PETROLATUM BLEND.: Brand: XEROFORM PETROLATUM

## (undated) DEVICE — BLADELESS OBTURATOR: Brand: WECK VISTA

## (undated) DEVICE — DISPOSABLE MONOPOLAR ENDOSCOPIC CORD 10 FT. (3M): Brand: KIRWAN

## (undated) DEVICE — DRILL BIT G3606010 2.4MM

## (undated) DEVICE — SUT ETHLN 5/0 P3 18IN 698G

## (undated) DEVICE — OCCLUSIVE GAUZE STRIP,3% BISMUTH TRIBROMOPHENATE IN PETROLATUM BLEND: Brand: XEROFORM

## (undated) DEVICE — TIP COVER ACCESSORY

## (undated) DEVICE — SUT MNCRYL PLS ANTIB UD 4/0 PS2 18IN

## (undated) DEVICE — APPL COTN TP PLSTC 6IN STRL LF PK/2

## (undated) DEVICE — SCRB SURG BACTOSHIELD CHG 4PCT 4OZ

## (undated) DEVICE — ENDOGATOR HYBRID TUBING KIT FOR USE WITH ENDOGATOR IRRIGATION PUMP, OLYMPUS PUMP, GI4000 ESU, AND TORRENT IRRIGATION PUMP.: Brand: ENDOGATOR KIT

## (undated) DEVICE — DRP SURG UTIL W/TPE 2/LAYR 15X26IN DISP

## (undated) DEVICE — CFF SCD HEMFRCE SEQ CLF STD XL

## (undated) DEVICE — COLUMN DRAPE

## (undated) DEVICE — ADHS LIQ MASTISOL 2/3ML

## (undated) DEVICE — ENDOPATH XCEL BLADELESS TROCARS WITH STABILITY SLEEVES: Brand: ENDOPATH XCEL

## (undated) DEVICE — BLANKT WARM UPPR/BDY ARM/OUT 57X196CM

## (undated) DEVICE — SYS CLS PORTSITE CT CLOSESURE 5AND10/12

## (undated) DEVICE — GLV SURG SENSICARE MICRO PF LF 7 STRL

## (undated) DEVICE — INTENDED FOR TISSUE SEPARATION, AND OTHER PROCEDURES THAT REQUIRE A SHARP SURGICAL BLADE TO PUNCTURE OR CUT.: Brand: BARD-PARKER ® STAINLESS STEEL BLADES

## (undated) DEVICE — ANTIBACTERIAL UNDYED BRAIDED (POLYGLACTIN 910), SYNTHETIC ABSORBABLE SUTURE: Brand: COATED VICRYL

## (undated) DEVICE — COVER,LIGHT HANDLE,FLX,1/PK: Brand: MEDLINE INDUSTRIES, INC.

## (undated) DEVICE — INTENDED FOR TISSUE SEPARATION, AND OTHER PROCEDURES THAT REQUIRE A SHARP SURGICAL BLADE TO PUNCTURE OR CUT.: Brand: BARD-PARKER ® SAFETYLOCK CARBON RIB-BACK BLADES

## (undated) DEVICE — SYR CONTRL LUERLOK 10CC

## (undated) DEVICE — ECHELON FLEX POWERED PLUS LONG ARTICULATING ENDOSCOPIC LINEAR CUTTER, 60MM: Brand: ECHELON FLEX

## (undated) DEVICE — TOTAL TRAY, 16FR 10ML SIL FOLEY, URN: Brand: MEDLINE

## (undated) DEVICE — APL DUPLOSPRAYER MIS 40CM

## (undated) DEVICE — GLV SURG DERMASSURE GRN LF PF 7.0

## (undated) DEVICE — STRIP,CLOSURE,WOUND,MEDI-STRIP,1/2X4: Brand: MEDLINE

## (undated) DEVICE — TISSUE RETRIEVAL SYSTEM: Brand: INZII RETRIEVAL SYSTEM

## (undated) DEVICE — SUT VIC 2/0 CT2 27IN J269H

## (undated) DEVICE — ELECTRD BLD EXT EDGE/INSUL 1P 4IN

## (undated) DEVICE — CVR HNDL LIGHT RIGID

## (undated) DEVICE — PK CHST BRST 10

## (undated) DEVICE — ENDOCUT SCISSOR TIP, DISPOSABLE: Brand: RENEW

## (undated) DEVICE — ST TBG CONN PNEUMOCLEAR EVAC SMOKE HEAT/HUMID

## (undated) DEVICE — ANTIBACTERIAL UNDYED BRAIDED (POLYGLACTIN 910), SYNTHETIC ABSORBABLE SURGICAL SUTURE: Brand: COATED VICRYL

## (undated) DEVICE — SYR LL TP 10ML STRL

## (undated) DEVICE — DRILL BIT NAVG3606010 NAVIGATED: Brand: NAVIGATED INFINITY™ OCCIPITOCERVICAL UPPER THORACIC SYSTEM

## (undated) DEVICE — PATIENT RETURN ELECTRODE, SINGLE-USE, CONTACT QUALITY MONITORING, ADULT, WITH 9FT CORD, FOR PATIENTS WEIGING OVER 33LBS. (15KG): Brand: MEGADYNE

## (undated) DEVICE — DRAPE,TOP,102X53,STERILE: Brand: MEDLINE

## (undated) DEVICE — 50" SINGLE PATIENT USE HOVERMATT: Brand: SINGLE PATIENT USE HOVERMATT

## (undated) DEVICE — DRSNG WND BORDR/ADHS NONADHR/GZ LF 2X2IN STRL

## (undated) DEVICE — ADHS SKIN PREMIERPRO EXOFIN TOPICAL HI/VISC .5ML

## (undated) DEVICE — UNDRGLV SURG BIOGEL PUNCTUREINDICATION SZ7 PF STRL

## (undated) DEVICE — DRSNG GZ PETROLTM XEROFORM CURAD 1X8IN STRL

## (undated) DEVICE — PK LAP LASR CHOLE 10

## (undated) DEVICE — ENDOPATH XCEL UNIVERSAL TROCAR STABLILITY SLEEVES: Brand: ENDOPATH XCEL

## (undated) DEVICE — MANIP UTER RUMI 2 KOH EFFICIENT 3.5CM BL

## (undated) DEVICE — LINER CANSTR SXN HERCULES

## (undated) DEVICE — ELECTRD BLD EZ CLN STD 2.5IN

## (undated) DEVICE — JACKSON-PRATT 100CC BULB RESERVOIR: Brand: CARDINAL HEALTH

## (undated) DEVICE — SUT MONOCRYL PLS ANTIB UND 3/0  PS1 27IN

## (undated) DEVICE — NDL HYPO ECLPS SFTY 25G 1 1/2IN

## (undated) DEVICE — CLTH CLENS READYCLEANSE PERI CARE PK/5

## (undated) DEVICE — CANNULA SEAL

## (undated) DEVICE — GLV SURG SENSICARE PI MIC PF SZ7.5 LF STRL

## (undated) DEVICE — [HIGH FLOW INSUFFLATOR,  DO NOT USE IF PACKAGE IS DAMAGED,  KEEP DRY,  KEEP AWAY FROM SUNLIGHT,  PROTECT FROM HEAT AND RADIOACTIVE SOURCES.]: Brand: PNEUMOSURE

## (undated) DEVICE — PK BARIATRIC 10

## (undated) DEVICE — Device: Brand: ENDOGATOR

## (undated) DEVICE — PK MAJ GYN DAVINCI 10

## (undated) DEVICE — ARM DRAPE

## (undated) DEVICE — APPL CHLORAPREP TINTED 26ML TEAL

## (undated) DEVICE — SPNG GZ WOVN 4X4IN 12PLY 10/BX STRL

## (undated) DEVICE — MARKR UTIL W/RULR W/LBL REGTP STRL

## (undated) DEVICE — SPNG LAP PREWSH SFTPK 18X18IN STRL PK/5

## (undated) DEVICE — FLTR PLUMEPORT LAP W/CONN STRL

## (undated) DEVICE — CATH FOL BARDEX 2WY 20F 30CC

## (undated) DEVICE — Device

## (undated) DEVICE — SUT MNCRYL PLS ANTIB UD 3/0 PS2 27IN

## (undated) DEVICE — SUT VIC 0 UR6 27IN VCP603H

## (undated) DEVICE — CATH URETRL FLXITP POLLACK STD 5F 70CM

## (undated) DEVICE — SKIN AFFIX SURG ADHESIVE 72/CS 0.55ML: Brand: MEDLINE

## (undated) DEVICE — GLV SURG BIOGEL LTX PF 7

## (undated) DEVICE — TRENDELENBURG WINGPAD POSITIONING KIT DELUXE - WITHOUT BODY STRAP: Brand: SOULE MEDICAL

## (undated) DEVICE — INSTRUMENT 6956013 3.5MM DRL BIT STRLE

## (undated) DEVICE — GLV SURG BIOGEL LTX PF 6

## (undated) DEVICE — PENCL E/S HNDSWCH ROCKRBTN HOLSTR 10FT

## (undated) DEVICE — DECANT BG O JET

## (undated) DEVICE — SNAP KOVER: Brand: UNBRANDED

## (undated) DEVICE — ANTIBACTERIAL VIOLET BRAIDED (POLYGLACTIN 910), SYNTHETIC ABSORBABLE SUTURE: Brand: COATED VICRYL

## (undated) DEVICE — INTENDED USE FOR SURGICAL MARKING ON INTACT SKIN, ALSO PROVIDES A PERMANENT METHOD OF IDENTIFYING OBJECTS IN THE OPERATING ROOM: Brand: WRITESITE® REGULAR TIP SKIN MARKER

## (undated) DEVICE — ENDOPATH PNEUMONEEDLE INSUFFLATION NEEDLES WITH LUER LOCK CONNECTORS 120MM: Brand: ENDOPATH

## (undated) DEVICE — PROXIMATE RH ROTATING HEAD SKIN STAPLERS (35 WIDE) CONTAINS 35 STAINLESS STEEL STAPLES: Brand: PROXIMATE

## (undated) DEVICE — LAPAROVUE VISIBILITY SYSTEM LAPAROSCOPIC SOLUTIONS: Brand: LAPAROVUE

## (undated) DEVICE — BLAKE SILICONE DRAIN, 15 FR ROUND, HUBLESS WITH 3/16" TROCAR: Brand: BLAKE

## (undated) DEVICE — GLV SURG SENSICARE W/ALOE PF LF 9 STRL

## (undated) DEVICE — MANIP UTER RUMI TP 6.7MMX8CM BLU

## (undated) DEVICE — DRSNG PAD ABD 8X10IN STRL

## (undated) DEVICE — GOWN,NON-REINFORCED,SIRUS,SET IN SLV,XL: Brand: MEDLINE

## (undated) DEVICE — SUT SILK 2/0 PS 18IN 1588H

## (undated) DEVICE — GOWN,REINF,POLY,ECL,PP SLV,XL: Brand: MEDLINE

## (undated) DEVICE — PK CYSTO-TUR BASIC 10

## (undated) DEVICE — IRRIGATOR BULB ASEPTO 60CC STRL

## (undated) DEVICE — SOL NACL 0.9PCT 1000ML

## (undated) DEVICE — GLV SURG SENSICARE LT W/ALOE PF LF 8.5 STRL